# Patient Record
Sex: FEMALE | Race: WHITE | Employment: OTHER | URBAN - METROPOLITAN AREA
[De-identification: names, ages, dates, MRNs, and addresses within clinical notes are randomized per-mention and may not be internally consistent; named-entity substitution may affect disease eponyms.]

---

## 2017-01-06 ENCOUNTER — OFFICE VISIT (OUTPATIENT)
Dept: INTERNAL MEDICINE CLINIC | Age: 82
End: 2017-01-06

## 2017-01-06 VITALS
BODY MASS INDEX: 29.16 KG/M2 | TEMPERATURE: 98.3 F | DIASTOLIC BLOOD PRESSURE: 60 MMHG | HEART RATE: 70 BPM | RESPIRATION RATE: 18 BRPM | HEIGHT: 65 IN | OXYGEN SATURATION: 98 % | SYSTOLIC BLOOD PRESSURE: 116 MMHG | WEIGHT: 175 LBS

## 2017-01-06 DIAGNOSIS — A49.3 MYCOPLASMA INFECTION: ICD-10-CM

## 2017-01-06 DIAGNOSIS — J06.9 UPPER RESPIRATORY TRACT INFECTION, UNSPECIFIED TYPE: Primary | ICD-10-CM

## 2017-01-06 RX ORDER — AZITHROMYCIN 250 MG/1
250 TABLET, FILM COATED ORAL SEE ADMIN INSTRUCTIONS
Qty: 6 TAB | Refills: 0 | Status: SHIPPED | OUTPATIENT
Start: 2017-01-06 | End: 2017-01-11

## 2017-01-06 NOTE — MR AVS SNAPSHOT
Visit Information Date & Time Provider Department Dept. Phone Encounter #  
 1/6/2017  8:45 AM Thu Anglin MD [facility] [phone] [id number] Upcoming Health Maintenance Date Due DTaP/Tdap/Td series (1 - Tdap) 12/20/1956 GLAUCOMA SCREENING Q2Y 12/20/2000 Pneumococcal 65+ Low/Medium Risk (2 of 2 - PPSV23) 1/1/2010 INFLUENZA AGE 9 TO ADULT 8/1/2016 MEDICARE YEARLY EXAM 1/5/2017 Allergies as of 1/6/2017  Review Complete On: 1/6/2017 By: Thu Anglin MD  
 No Known Allergies Current Immunizations  Reviewed on 11/30/2010 Name Date Influenza High Dose Vaccine PF 11/4/2015 Influenza Vaccine Whole 10/1/2010 Pneumococcal Vaccine (Unspecified Type) 1/1/2005 Zoster 9/1/2010 Not reviewed this visit You Were Diagnosed With   
  
 Codes Comments Upper respiratory tract infection, unspecified type    -  Primary ICD-10-CM: J06.9 ICD-9-CM: 465.9 Vitals BP Pulse Temp Resp Height(growth percentile) Weight(growth percentile) 116/60 70 98.3 °F (36.8 °C) (Oral) 18 5' 5\" (1.651 m) 175 lb (79.4 kg) SpO2 BMI OB Status Smoking Status 98% 29.12 kg/m2 Hysterectomy Never Smoker Vitals History BMI and BSA Data Body Mass Index Body Surface Area  
 29.12 kg/m 2 1.91 m 2 Preferred Pharmacy Pharmacy Name Phone [address] [facility] AT [address] [place] [address]. [phone] Your Updated Medication List  
  
   
This list is accurate as of: 1/6/17  9:07 AM.  Always use your most recent med list.  
  
  
  
  
 aspirin 325 mg tablet Commonly known as:  ASPIRIN Take 325 mg by mouth daily. atorvastatin 20 mg tablet Commonly known as:  LIPITOR Take 1 tablet by mouth  daily  
  
 azelastine 137 mcg (0.1 %) nasal spray Commonly known as:  ASTELIN  
2 Sprays by Both Nostrils route two (2) times a day.  Use in each nostril as directed  
  
 azithromycin 250 mg tablet Commonly known as:  Ferdie Bel Take 1 Tab by mouth See Admin Instructions for 5 days. bumetanide 1 mg tablet Commonly known as:  BUMEX  
  
 cyclobenzaprine 5 mg tablet Commonly known as:  FLEXERIL Take 5 mg by mouth every twelve (12) hours as needed for Muscle Spasm(s). estradiol 10 mcg Tab vaginal tablet Commonly known as:  Esteban Gerard Insert 1 tablet into vagina every Tuesday and Thursday. isosorbide mononitrate ER 30 mg tablet Commonly known as:  IMDUR Take 1 tablet by mouth  every morning * lisinopril 2.5 mg tablet Commonly known as:  Lulu Paulo Take 2.5 mg by mouth two (2) times a day. * lisinopril 10 mg tablet Commonly known as:  PRINIVIL, ZESTRIL  
  
 methIMAzole 5 mg tablet Commonly known as:  TAPAZOLE Take 1.5 Tabs by mouth daily. metoprolol tartrate 25 mg tablet Commonly known as:  LOPRESSOR Take one-half tablet by  mouth twice a day NITROSTAT 0.4 mg SL tablet Generic drug:  nitroglycerin DISSOLVE 1 TABLET UNDER TONGUE EVERY 5 MINUTES AS NEEDED FOR CHEST PAIN  
  
 PLAVIX 75 mg Tab Generic drug:  clopidogrel Take 75 mg by mouth daily. TYLENOL EXTRA STRENGTH 500 mg tablet Generic drug:  acetaminophen Take  by mouth every six (6) hours as needed for Pain. * Notice: This list has 2 medication(s) that are the same as other medications prescribed for you. Read the directions carefully, and ask your doctor or other care provider to review them with you. Prescriptions Sent to Pharmacy Refills  
 azithromycin (ZITHROMAX Z-SUSIE) 250 mg tablet 0 Sig: Take 1 Tab by mouth See Admin Instructions for 5 days. Class: Normal  
 Pharmacy: [facility] [address] [place] [address] AT [address] [facility] [address]. Ph #: [phone] Route: Oral  
  
Introducing [facility]! Denita Centeno introduces [employer] patient portal. Now you can access parts of your medical record, email your doctor's office, and request medication refills online. 1. In your internet browser, go to [email or web address] 2. Click on the First Time User? Click Here link in the Sign In box. You will see the New Member Sign Up page. 3. Enter your [employer] Access Code exactly as it appears below. You will not need to use this code after youve completed the sign-up process. If you do not sign up before the expiration date, you must request a new code. · [employer] Access Code: Nikhil [email or web address] Expires: 4/6/2017  9:07 AM 
 
4. Enter the last four digits of your Social Security Number (xxxx) and Date of Birth (mm/dd/yyyy) as indicated and click Submit. You will be taken to the next sign-up page. 5. Create a [employer] ID. This will be your [employer] login ID and cannot be changed, so think of one that is secure and easy to remember. 6. Create a [employer] password. You can change your password at any time. 7. Enter your Password Reset Question and Answer. This can be used at a later time if you forget your password. 8. Enter your e-mail address. You will receive e-mail notification when new information is available in [address] 9. Click Sign Up. You can now view and download portions of your medical record. 10. Click the Download Summary menu link to download a portable copy of your medical information. If you have questions, please visit the Frequently Asked Questions section of the [employer] website. Remember, [employer] is NOT to be used for urgent needs. For medical emergencies, dial 911. Now available from your iPhone and Android! Please provide this summary of care documentation to your next provider. Your primary care clinician is listed as [id number] [address] [place] [address] [address]. If you have any questions after today's visit, please call [phone].

## 2017-01-06 NOTE — PROGRESS NOTES
Subjective:      Patient : This patient is a .age, .sex that presents with a chief complaint of cough:  productive of  white. , sore throat : which increases with swallowing  and hoarseness and malaises. The symptoms have been present for 6 days. They have worsened.  DX pneumoniza  . Objective:     Visit Vitals    /60    Pulse 70    Temp 98.3 °F (36.8 °C) (Oral)    Resp 18    Ht 5' 5\" (1.651 m)    Wt 175 lb (79.4 kg)    SpO2 98%    BMI 29.12 kg/m2          Skin:  warm  HEENT:  rhinorrhea  Heart regular rhythm  Lungs: clear to auscultation and percussion throughout both lung fields  CV:normal  Abdomen   Extremeties:  Neuro alert, oriented x 3      Past Medical History   Diagnosis Date    CAD (coronary artery disease)      s/p cabg, dr Mercedes Milder 2000 x4 vessel disease    Hypercholesterolemia     Hypertension     Hyperthyroidism      1990's, dr Lee Star    Osteoporosis      Family History   Problem Relation Age of Onset    Cancer Mother 48     breast     Stroke Mother     Cancer Father      lung    Lung Disease Father     Heart Disease Sister      Current Outpatient Prescriptions   Medication Sig Dispense Refill    atorvastatin (LIPITOR) 20 mg tablet Take 1 tablet by mouth  daily 90 Tab 1    estradiol (VAGIFEM) 10 mcg tab vaginal tablet Insert 1 tablet into vagina every Tuesday and Thursday. 90 Tab 1    methimazole (TAPAZOLE) 5 mg tablet Take 1.5 Tabs by mouth daily. 135 Tab 1    metoprolol (LOPRESSOR) 25 mg tablet Take one-half tablet by  mouth twice a day 180 Tab 1    isosorbide mononitrate ER (IMDUR) 30 mg tablet Take 1 tablet by mouth  every morning (Patient taking differently: Take 2 tablet by mouth  every morning) 90 Tab 1    NITROSTAT 0.4 mg SL tablet DISSOLVE 1 TABLET UNDER TONGUE EVERY 5 MINUTES AS NEEDED FOR CHEST PAIN 25 Tab 0    azelastine (ASTELIN) 137 mcg nasal spray 2 Sprays by Both Nostrils route two (2) times a day.  Use in each nostril as directed 1 Bottle 11    cyclobenzaprine (FLEXERIL) 5 mg tablet Take 5 mg by mouth every twelve (12) hours as needed for Muscle Spasm(s).  acetaminophen (TYLENOL EXTRA STRENGTH) 500 mg tablet Take  by mouth every six (6) hours as needed for Pain.  bumetanide (BUMEX) 1 mg tablet       lisinopril (PRINIVIL, ZESTRIL) 10 mg tablet       clopidogrel (PLAVIX) 75 mg tablet Take 75 mg by mouth daily.  lisinopril (PRINIVIL, ZESTRIL) 2.5 mg tablet Take 2.5 mg by mouth two (2) times a day.  aspirin (ASPIRIN) 325 mg tablet Take 325 mg by mouth daily. No Known Allergies  Social History     Social History    Marital status:      Spouse name: N/A    Number of children: N/A    Years of education: N/A     Occupational History    Not on file. Social History Main Topics    Smoking status: Never Smoker    Smokeless tobacco: Never Used    Alcohol use No    Drug use: No    Sexual activity: Yes     Partners: Male     Other Topics Concern    Not on file     Social History Narrative    Lives with . Assessment:     Uri poss mycoplasma infection    Plan:     The patient seems to have a bacterial process. Will treat empirically   Due to  being so ill will cover                 Veritojoseph Seymour was seen today for cough. Diagnoses and all orders for this visit:    Upper respiratory tract infection, unspecified type    Mycoplasma infection    Other orders  -     azithromycin (ZITHROMAX Z-SUSIE) 250 mg tablet; Take 1 Tab by mouth See Admin Instructions for 5 days.

## 2017-01-08 RX ORDER — ATORVASTATIN CALCIUM 20 MG/1
TABLET, FILM COATED ORAL
Qty: 90 TAB | Refills: 1 | Status: SHIPPED | OUTPATIENT
Start: 2017-01-08 | End: 2017-07-20 | Stop reason: SDUPTHER

## 2017-02-14 RX ORDER — ESTRADIOL 10 UG/1
TABLET VAGINAL
Qty: 25 TAB | Refills: 2 | Status: SHIPPED | OUTPATIENT
Start: 2017-02-14 | End: 2017-07-12

## 2017-02-14 RX ORDER — ESTRADIOL 10 UG/1
INSERT VAGINAL
Qty: 90 TAB | Refills: 0 | Status: SHIPPED | OUTPATIENT
Start: 2017-02-14 | End: 2017-02-14 | Stop reason: SDUPTHER

## 2017-07-12 ENCOUNTER — OFFICE VISIT (OUTPATIENT)
Dept: INTERNAL MEDICINE CLINIC | Age: 82
End: 2017-07-12

## 2017-07-12 VITALS
BODY MASS INDEX: 28.32 KG/M2 | TEMPERATURE: 98.1 F | SYSTOLIC BLOOD PRESSURE: 120 MMHG | HEART RATE: 57 BPM | DIASTOLIC BLOOD PRESSURE: 62 MMHG | OXYGEN SATURATION: 97 % | WEIGHT: 170 LBS | HEIGHT: 65 IN | RESPIRATION RATE: 16 BRPM

## 2017-07-12 DIAGNOSIS — Z23 NEED FOR TDAP VACCINATION: ICD-10-CM

## 2017-07-12 DIAGNOSIS — E78.00 PURE HYPERCHOLESTEROLEMIA: ICD-10-CM

## 2017-07-12 DIAGNOSIS — R10.12 LEFT UPPER QUADRANT PAIN: ICD-10-CM

## 2017-07-12 DIAGNOSIS — Z23 NEED FOR VACCINATION WITH 13-POLYVALENT PNEUMOCOCCAL CONJUGATE VACCINE: ICD-10-CM

## 2017-07-12 DIAGNOSIS — Z00.00 ROUTINE GENERAL MEDICAL EXAMINATION AT A HEALTH CARE FACILITY: ICD-10-CM

## 2017-07-12 DIAGNOSIS — E05.90 HYPERTHYROIDISM: ICD-10-CM

## 2017-07-12 DIAGNOSIS — F43.21 GRIEF: ICD-10-CM

## 2017-07-12 DIAGNOSIS — M54.81 OCCIPITAL NEURALGIA OF LEFT SIDE: ICD-10-CM

## 2017-07-12 DIAGNOSIS — Z71.89 LIVING WILL, COUNSELING/DISCUSSION: ICD-10-CM

## 2017-07-12 DIAGNOSIS — M17.12 PRIMARY OSTEOARTHRITIS OF LEFT KNEE: Primary | ICD-10-CM

## 2017-07-12 DIAGNOSIS — I10 ESSENTIAL HYPERTENSION: ICD-10-CM

## 2017-07-12 DIAGNOSIS — Z00.00 MEDICARE ANNUAL WELLNESS VISIT, SUBSEQUENT: ICD-10-CM

## 2017-07-12 RX ORDER — LISINOPRIL 10 MG/1
5 TABLET ORAL DAILY
Qty: 90 TAB | Refills: 1 | Status: SHIPPED | OUTPATIENT
Start: 2017-07-12 | End: 2019-04-05 | Stop reason: ALTCHOICE

## 2017-07-12 NOTE — PROGRESS NOTES
Reviewed record in preparation for visit and have obtained necessary documentation. Identified pt with two pt identifiers(name and ). Health Maintenance Due   Topic    DTaP/Tdap/Td series (1 - Tdap)    GLAUCOMA SCREENING Q2Y     Pneumococcal 65+ Low/Medium Risk (2 of 2 - PPSV23)    MEDICARE YEARLY EXAM          Chief Complaint   Patient presents with   Radha Montiel Annual Wellness Visit        Wt Readings from Last 3 Encounters:   17 175 lb (79.4 kg)   16 175 lb (79.4 kg)   16 167 lb (75.8 kg)     Temp Readings from Last 3 Encounters:   17 98.3 °F (36.8 °C) (Oral)   16 98.4 °F (36.9 °C) (Oral)   16 98.5 °F (36.9 °C) (Oral)     BP Readings from Last 3 Encounters:   17 116/60   16 157/69   16 122/63     Pulse Readings from Last 3 Encounters:   17 70   16 (!) 56   16 70           Learning Assessment:  :     Learning Assessment 2014   PRIMARY LEARNER Patient Patient   PRIMARY LANGUAGE ENGLISH ENGLISH   LEARNER PREFERENCE PRIMARY DEMONSTRATION READING   ANSWERED BY patient patient   RELATIONSHIP SELF SELF       Depression Screening:  :     PHQ over the last two weeks 2017   PHQ Not Done -   Little interest or pleasure in doing things Not at all   Feeling down, depressed or hopeless Not at all   Total Score PHQ 2 0       Fall Risk Assessment:  :     Fall Risk Assessment, last 12 mths 2017   Able to walk? Yes   Fall in past 12 months? No       Abuse Screening:  :     Abuse Screening Questionnaire 2017   Do you ever feel afraid of your partner? N   Are you in a relationship with someone who physically or mentally threatens you? N   Is it safe for you to go home? Y       Coordination of Care Questionnaire:  :     1) Have you been to an emergency room, urgent care clinic since your last visit No     Hospitalized since your last visit? No            2) Have you seen or consulted any other health care providers outside of [facility] [address] since your last visit? Yes    3) Do you have an Advance Directive on file? Yes   4) Are you interested in receiving information on Advance Directives? No        Patient is accompanied by self I have received verbal consent from Natalie Hernández to discuss any/all medical information while they are present in the room.

## 2017-07-12 NOTE — PROGRESS NOTES
HISTORY OF PRESENT ILLNESS  Analilia Bunn is a 80 y.o. female. HPI  Here for several concerns. She has had left knee pain medially with activity of late. She has had right tkr. She uses tylenol. She also has long standing left posterior neck pain to her occiput intermittently for years worse lately. The pain is brief. She is having luq pain for months. She is constipated. She has concerns about pancreatic cancer as her  just  of same. They were  61 years. She is due for labs on a statin. She is dizzy on her ace. She has not seen endocrine recently for her overactive thyroid. Past Medical History:   Diagnosis Date    CAD (coronary artery disease)     s/p cabg, dr Sabra Mcarthur 2000 x4 vessel disease    Hypercholesterolemia     Hypertension     Hyperthyroidism     , dr Tyrese Astorga    Osteoporosis      Current Outpatient Prescriptions   Medication Sig    lisinopril (PRINIVIL, ZESTRIL) 10 mg tablet Take 0.5 Tabs by mouth daily.  YUVAFEM 10 mcg tab vaginal tablet USE TWICE A WEEK    atorvastatin (LIPITOR) 20 mg tablet Take 1 tablet by mouth  daily    acetaminophen (TYLENOL EXTRA STRENGTH) 500 mg tablet Take  by mouth every six (6) hours as needed for Pain.  bumetanide (BUMEX) 1 mg tablet     methimazole (TAPAZOLE) 5 mg tablet Take 1.5 Tabs by mouth daily.  metoprolol (LOPRESSOR) 25 mg tablet Take one-half tablet by  mouth twice a day    isosorbide mononitrate ER (IMDUR) 30 mg tablet Take 1 tablet by mouth  every morning (Patient taking differently: Take 2 tablet by mouth  every morning)    NITROSTAT 0.4 mg SL tablet DISSOLVE 1 TABLET UNDER TONGUE EVERY 5 MINUTES AS NEEDED FOR CHEST PAIN    aspirin (ASPIRIN) 325 mg tablet Take 325 mg by mouth daily.  cyclobenzaprine (FLEXERIL) 5 mg tablet Take 5 mg by mouth every twelve (12) hours as needed for Muscle Spasm(s).  clopidogrel (PLAVIX) 75 mg tablet Take 75 mg by mouth daily.      No current facility-administered medications for this visit. Review of Systems   All other systems reviewed and are negative. Visit Vitals    /62 (BP 1 Location: Left arm, BP Patient Position: At rest)    Pulse (!) 57    Temp 98.1 °F (36.7 °C) (Oral)    Resp 16    Ht 5' 5\" (1.651 m)    Wt 170 lb (77.1 kg)    SpO2 97%    BMI 28.29 kg/m2       Physical Exam   Constitutional: She appears well-developed and well-nourished. Cardiovascular: Normal rate, regular rhythm and normal heart sounds. Pulmonary/Chest: Effort normal and breath sounds normal. No respiratory distress. She has no wheezes. She has no rales. Abdominal: Soft. Bowel sounds are normal. She exhibits no distension and no mass. There is no tenderness. There is no rebound and no guarding. Psychiatric: Her behavior is normal. Thought content normal.   Tearful intermittently. Nursing note and vitals reviewed. ASSESSMENT and PLAN  Sergio Paniagua was seen today for annual wellness visit. Diagnoses and all orders for this visit:    Primary osteoarthritis of left knee  -     REFERRAL TO ORTHOPEDIC SURGERY  Continue tylenol 1000 mg TID. Grief   good support with daughter and active in [employer]. Occipital neuralgia of left side  Reassurance. Left upper quadrant pain  -     CT ABD PELV W WO CONT; Future  -     REFERRAL TO GASTROENTEROLOGY  -     CBC WITH AUTOMATED DIFF    Hyperthyroidism  -     TSH 3RD GENERATION; Future  -     T4  -     T3 TOTAL    Essential hypertension  -     METABOLIC PANEL, COMPREHENSIVE  -    reduce lisinopril (PRINIVIL, ZESTRIL) 10 mg tablet; Take 0.5 Tabs by mouth daily.     Pure hypercholesterolemia  -     LIPID PANEL        Reviewed plan of care with the patient who has provided input and agrees with the goals

## 2017-07-12 NOTE — MR AVS SNAPSHOT
Visit Information Date & Time Provider Department Dept. Phone Encounter #  
 7/12/2017 11:30 AM Charla Kim MD [facility] [phone] [id number] Upcoming Health Maintenance Date Due DTaP/Tdap/Td series (1 - Tdap) 12/20/1956 GLAUCOMA SCREENING Q2Y 12/20/2000 Pneumococcal 65+ Low/Medium Risk (2 of 2 - PPSV23) 1/1/2010 MEDICARE YEARLY EXAM 1/5/2017 INFLUENZA AGE 9 TO ADULT 8/1/2017 Allergies as of 7/12/2017  Review Complete On: 7/12/2017 By: Grecia Reddy No Known Allergies Current Immunizations  Reviewed on 11/30/2010 Name Date Influenza High Dose Vaccine PF 11/4/2015 Influenza Vaccine Whole 10/1/2010 Pneumococcal Vaccine (Unspecified Type) 1/1/2005 Zoster 9/1/2010 Not reviewed this visit You Were Diagnosed With   
  
 Codes Comments Primary osteoarthritis of left knee    -  Primary ICD-10-CM: M17.12 
ICD-9-CM: 715.16 Grief     ICD-10-CM: F38.34 ICD-9-CM: 309.0 Occipital neuralgia of left side     ICD-10-CM: M54.81 ICD-9-CM: 723.8 Left upper quadrant pain     ICD-10-CM: R10.12 ICD-9-CM: 789.02 Hyperthyroidism     ICD-10-CM: E05.90 ICD-9-CM: 242.90 Essential hypertension     ICD-10-CM: I10 
ICD-9-CM: 401.9 Pure hypercholesterolemia     ICD-10-CM: E78.00 ICD-9-CM: 272.0 Medicare annual wellness visit, subsequent     ICD-10-CM: Z00.00 ICD-9-CM: V70.0 Need for vaccination with 13-polyvalent pneumococcal conjugate vaccine     ICD-10-CM: Z77 ICD-9-CM: V03.82 Need for Tdap vaccination     ICD-10-CM: Q75 ICD-9-CM: V06.1 Living will, counseling/discussion     ICD-10-CM: Z71.89 ICD-9-CM: V65.49 Vitals BP Pulse Temp Resp Height(growth percentile) Weight(growth percentile) 120/62 (BP 1 Location: Left arm, BP Patient Position: At rest) (!) 57 98.1 °F (36.7 °C) (Oral) 16 5' 5\" (1.651 m) 170 lb (77.1 kg) SpO2 BMI OB Status Smoking Status 97% 28.29 kg/m2 Hysterectomy Never Smoker BMI and BSA Data Body Mass Index Body Surface Area  
 28.29 kg/m 2 1.88 m 2 Preferred Pharmacy Pharmacy Name Phone [address] AT [address] [place] [address]. [phone] Your Updated Medication List  
  
   
This list is accurate as of: 7/12/17 12:10 PM.  Always use your most recent med list.  
  
  
  
  
 aspirin 325 mg tablet Commonly known as:  ASPIRIN Take 325 mg by mouth daily. atorvastatin 20 mg tablet Commonly known as:  LIPITOR Take 1 tablet by mouth  daily  
  
 bumetanide 1 mg tablet Commonly known as:  BUMEX  
  
 cyclobenzaprine 5 mg tablet Commonly known as:  FLEXERIL Take 5 mg by mouth every twelve (12) hours as needed for Muscle Spasm(s). isosorbide mononitrate ER 30 mg tablet Commonly known as:  IMDUR Take 1 tablet by mouth  every morning  
  
 lisinopril 10 mg tablet Commonly known as:  Katlyn Dowdy Take 0.5 Tabs by mouth daily. methIMAzole 5 mg tablet Commonly known as:  TAPAZOLE Take 1.5 Tabs by mouth daily. metoprolol tartrate 25 mg tablet Commonly known as:  LOPRESSOR Take one-half tablet by  mouth twice a day NITROSTAT 0.4 mg SL tablet Generic drug:  nitroglycerin DISSOLVE 1 TABLET UNDER TONGUE EVERY 5 MINUTES AS NEEDED FOR CHEST PAIN  
  
 PLAVIX 75 mg Tab Generic drug:  clopidogrel Take 75 mg by mouth daily. TYLENOL EXTRA STRENGTH 500 mg tablet Generic drug:  acetaminophen Take  by mouth every six (6) hours as needed for Pain. YUVAFEM 10 mcg Tab vaginal tablet Generic drug:  estradiol USE TWICE A WEEK Prescriptions Sent to Pharmacy Refills  
 lisinopril (PRINIVIL, ZESTRIL) 10 mg tablet 1 Sig: Take 0.5 Tabs by mouth daily.   
 Class: Normal  
 Pharmacy: [facility].  #: [phone] Route: Oral  
  
We Performed the Following CBC WITH AUTOMATED DIFF [50899 CPT(R)] LIPID PANEL [66053 CPT(R)] METABOLIC PANEL, COMPREHENSIVE [60595 CPT(R)] REFERRAL TO GASTROENTEROLOGY [TVF52 Custom] Comments:  
 Please evaluate patient for abd pain. REFERRAL TO OPHTHALMOLOGY [REF57 Custom] Comments:  
 Please evaluate patient for screen. REFERRAL TO ORTHOPEDIC SURGERY [REF62 Custom] Comments:  
 Please evaluate patient for left knee. T3 TOTAL [id number] CPT(R)] T4 [id number] CPT(R)] To-Do List   
 07/12/2017 Imaging:  CT ABD PELV W WO CONT   
  
 07/12/2017 Lab:  TSH 3RD GENERATION Referral Information Referral ID Referred By Referred To  
  
 [id number] Dalila Philip Not Available Visits Status Start Date End Date 1 New Request 7/12/17 7/12/18 If your referral has a status of pending review or denied, additional information will be sent to support the outcome of this decision. Referral ID Referred By Referred To  
 [id number] [facility], [address] [place] [address] [facility] [id number] [place], [facility] [address], [address] Visits Status Start Date End Date 1 New Request 7/12/17 7/12/18 If your referral has a status of pending review or denied, additional information will be sent to support the outcome of this decision. Referral ID Referred By Referred To  
 [id number] [facility], [address] [place], [address] Visits Status Start Date End Date 1 New Request 7/12/17 7/12/18 If your referral has a status of pending review or denied, additional information will be sent to support the outcome of this decision. Patient Instructions Medicare Part B Preventive Services Limitations Recommendation Scheduled Glaucoma Screening 
(Eye Exam)  Covered for patients with diagnosis of diabetes or family history of glaucoma; -Americans age 48 and older; -Americans age 72 and older Completed Recommended every 1-2 years Due now Colorectal Cancer Screening 
  
-Fecal occult blood test every year OR 
-Flexible sigmoidoscopy every 5 yrs OR 
-Colonoscopy every 10 years OR 
-Barium Enema Age 54-65; After age 76 if history of abnormal results Completed Recommended every 10 years  Completed Let your provider know if you have any concerns Cardiovascular Screening Blood Tests  
(Cholesterol panel) Lipid panel every 5 years; Annually if diagnosed with high cholesterol and/or diabetes  Completed 8/16/2016 Recommended annually Due NOW Diabetes Screening Tests -BMP or HgbA1C BMP every 3 years for non-diabetic patients Hgb A1C every 6 months for  
pre-diabetic patients Completed 8/16/2016 Labs- CMP Due NOW Seasonal Influenza Vaccination Age 6 months and older Completed 11/15/2015 Recommended Annually Due Fall 2017 Pneumococcal Vaccination 
(PPSV 23) Age 72 and older; 
<65 if at high risk for getting Pneumonia Completed 1/1/2005 Recommended once Completed Prevnar Vaccination (PCV 13) Age 72 and older Completed Recommended once Due NOW Take prescription to pharmacy for administration Tetanus Vaccine -Only covered by Medicare Part D through the pharmacy -Requires a prescription from your primary care provider Completed Recommended every 10 years   Due NOW Take prescription to pharmacy for administration Zoster Vaccine (Shingles) Age 61 and older, one time dose 
  
-Only covered by Medicare Part D through the pharmacy Completed 9/1/2010 Recommended once  Completed Bone Mass Measurement (Dexascan) Covered age 72 and older Completed 1/21/2016 Let your provider know if you have any concerns Screening Mammography Covered annually age 36 and older Completed 7/11/2014 Completed Let your provider know if you have any concerns Screening Pap Tests and Pelvic Examination  Covered every 2 years, or annually for high risk Completed Recommended every 3 years age 18-67 Completed Let your provider know if you have any concerns Advanced Medical Directive If you have completed an Advanced Medical Directive please bring a copy to the office at your convenience to be scanned into your record. Introducing [facility] SERVICES! Donita Delatorre introduces TORIA patient portal. Now you can access parts of your medical record, email your doctor's office, and request medication refills online. 1. In your internet browser, go to [email or web address] 2. Click on the First Time User? Click Here link in the Sign In box. You will see the New Member Sign Up page. 3. Enter your TORIA Access Code exactly as it appears below. You will not need to use this code after youve completed the sign-up process. If you do not sign up before the expiration date, you must request a new code. · TORIA Access Code: [email or web address] Expires: 10/10/2017 12:10 PM 
 
4. Enter the last four digits of your Social Security Number (xxxx) and Date of Birth (mm/dd/yyyy) as indicated and click Submit. You will be taken to the next sign-up page. 5. Create a TORIA ID. This will be your TORIA login ID and cannot be changed, so think of one that is secure and easy to remember. 6. Create a TORIA password. You can change your password at any time. 7. Enter your Password Reset Question and Answer. This can be used at a later time if you forget your password. 8. Enter your e-mail address. You will receive e-mail notification when new information is available in [address] 9. Click Sign Up. You can now view and download portions of your medical record. 10. Click the Download Summary menu link to download a portable copy of your medical information. If you have questions, please visit the Frequently Asked Questions section of the [employer] website. Remember, [employer] is NOT to be used for urgent needs. For medical emergencies, dial 911. Now available from your iPhone and Android! Please provide this summary of care documentation to your next provider. Your primary care clinician is listed as [id number] [address] [place] [address] [address]. If you have any questions after today's visit, please call [phone].

## 2017-07-12 NOTE — PROGRESS NOTES
This is a Subsequent Medicare Annual Wellness Visit providing Personalized Prevention Plan Services (PPPS) (Performed 12 months after initial AWV and PPPS )    I have reviewed the patient's medical history in detail and updated the computerized patient record. History     Past Medical History:   Diagnosis Date    CAD (coronary artery disease)     s/p cabg, dr Marilyn Burger 2000 x4 vessel disease    Hypercholesterolemia     Hypertension     Hyperthyroidism     1990's, dr Jenifer Arroyo    Osteoporosis       Past Surgical History:   Procedure Laterality Date    BIOPSY OF BREAST, INCISIONAL  1985    LEFT breast    CABG, ARTERY-VEIN, FOUR  2000    heart surgery at [address] [place] [address]      dr Josee bolden    HX COLONOSCOPY  2011    HX HYSTERECTOMY  2003    HX BRIAN AND BSO      2002    HX TRACHEOSTOMY      whooping cough age 2    SC ANESTH,KNEE AREA SURGERY      tkr, right; 2008     Current Outpatient Prescriptions   Medication Sig Dispense Refill    lisinopril (PRINIVIL, ZESTRIL) 10 mg tablet Take 0.5 Tabs by mouth daily. 90 Tab 1    YUVAFEM 10 mcg tab vaginal tablet USE TWICE A WEEK 24 Tab 1    atorvastatin (LIPITOR) 20 mg tablet Take 1 tablet by mouth  daily 90 Tab 1    acetaminophen (TYLENOL EXTRA STRENGTH) 500 mg tablet Take  by mouth every six (6) hours as needed for Pain.  bumetanide (BUMEX) 1 mg tablet       methimazole (TAPAZOLE) 5 mg tablet Take 1.5 Tabs by mouth daily. 135 Tab 1    metoprolol (LOPRESSOR) 25 mg tablet Take one-half tablet by  mouth twice a day 180 Tab 1    isosorbide mononitrate ER (IMDUR) 30 mg tablet Take 1 tablet by mouth  every morning (Patient taking differently: Take 2 tablet by mouth  every morning) 90 Tab 1    NITROSTAT 0.4 mg SL tablet DISSOLVE 1 TABLET UNDER TONGUE EVERY 5 MINUTES AS NEEDED FOR CHEST PAIN 25 Tab 0    aspirin (ASPIRIN) 325 mg tablet Take 325 mg by mouth daily.       cyclobenzaprine (FLEXERIL) 5 mg tablet Take 5 mg by mouth every twelve (12) hours as needed for Muscle Spasm(s).  clopidogrel (PLAVIX) 75 mg tablet Take 75 mg by mouth daily. No Known Allergies  Family History   Problem Relation Age of Onset    Cancer Mother 48     breast     Stroke Mother     Cancer Father      lung    Lung Disease Father     Heart Disease Sister      Social History   Substance Use Topics    Smoking status: Never Smoker    Smokeless tobacco: Never Used    Alcohol use No     Patient Active Problem List   Diagnosis Code    Hyperlipidemia with target LDL less than 70 E78.5    Hyperthyroidism E05.90    HTN (hypertension) I10    Osteopenia M85.80    Coronary artery disease involving autologous artery coronary bypass graft without angina pectoris I25.810    Living will, counseling/discussion Z71.89       Depression Risk Factor Screening:     PHQ over the last two weeks 7/12/2017   PHQ Not Done -   Little interest or pleasure in doing things Not at all   Feeling down, depressed or hopeless Not at all   Total Score PHQ 2 0     Alcohol Risk Factor Screening:   deferred      Functional Ability and Level of Safety:     Hearing Loss   none    Activities of Daily Living   Self-care. Requires assistance with: no ADLs    Fall Risk     Fall Risk Assessment, last 12 mths 7/12/2017   Able to walk? Yes   Fall in past 12 months? No     Abuse Screen   Patient is not abused    Review of Systems   Not required  annual wellness visit    Physical Examination     Evaluation of Cognitive Function:  Mood/affect:  sad  Appearance: age appropriate, casually dressed and younger than stated age  Family member/caregiver input: daughter is present    No exam performed today, annual wellness visit.     Patient Care Team:  Joe Hall MD as PCP - General (Internal Medicine)  Lauro Bundy MD (Cardiology)  Marlo Jovel, RN as Ambulatory Care Navigator (Internal Medicine)  Thomas Arzate MD (Internal Medicine)    Advice/Referrals/Counseling   Education and counseling provided:  Are appropriate based on today's review and evaluation  End-of-Life planning (with patient's consent)  Pneumococcal Vaccine  Influenza Vaccine  Screening for glaucoma      Assessment/Plan       ICD-10-CM ICD-9-CM    1. Primary osteoarthritis of left knee M17.12 715.16 REFERRAL TO ORTHOPEDIC SURGERY   2. Grief F43.20 309.0    3. Occipital neuralgia of left side M54.81 723.8    4. Left upper quadrant pain R10.12 789.02 CT ABD PELV W WO CONT      REFERRAL TO GASTROENTEROLOGY      CBC WITH AUTOMATED DIFF   5. Hyperthyroidism E05.90 242.90 TSH 3RD GENERATION      T4      T3 TOTAL   6. Essential hypertension P49 926.3 METABOLIC PANEL, COMPREHENSIVE      lisinopril (PRINIVIL, ZESTRIL) 10 mg tablet   7. Pure hypercholesterolemia E78.00 272.0 LIPID PANEL   8. Medicare annual wellness visit, subsequent Z00.00 V70.0 REFERRAL TO OPHTHALMOLOGY   9. Need for vaccination with 13-polyvalent pneumococcal conjugate vaccine Z23 V03.82    10. Need for Tdap vaccination Z23 V06.1    11. Living will, counseling/discussion Z71.89 V65.49      An After Visit Summary was printed and given to the patient. 1. All age appropriate screenings and immunizations are discussed with patient. Patient is given a prescription today for prevnar and tetanus vaccines. She is due for an eye exam (glaucoma screening) and is given a referral today. She no longer gets mammograms. 2.  She has an AMD, but her  has recently passed away and she may need to up date form. She is given a form today and instructed on how to complete it. 3.  She does not exercise regularly. She is still very much grieving the loss of her . She states she will consider becoming more active in the coming months, but not at this time.

## 2017-07-12 NOTE — PATIENT INSTRUCTIONS
Medicare Part B Preventive Services Limitations Recommendation Scheduled   Glaucoma Screening  (Eye Exam)  Covered for patients with diagnosis of diabetes or family history of glaucoma; -Americans age 48 and older; -Americans age 72 and older Completed  Recommended every 1-2 years Due now      Colorectal Cancer Screening     -Fecal occult blood test every year OR  -Flexible sigmoidoscopy every 5 yrs OR  -Colonoscopy every 10 years OR  -Barium Enema Age 54-65; After age 76 if history of abnormal results Completed         Recommended every 10 years  Completed     Let your provider know if you have any concerns   Cardiovascular Screening Blood Tests   (Cholesterol panel) Lipid panel every 5 years;  Annually if diagnosed with high cholesterol and/or diabetes  Completed   8/16/2016  Recommended annually Due NOW   Diabetes Screening Tests     -BMP or HgbA1C BMP every 3 years for non-diabetic patients     Hgb A1C every 6 months for   pre-diabetic patients Completed   8/16/2016  Labs- CMP Due NOW   Seasonal Influenza Vaccination Age 6 months and older Completed   11/15/2015  Recommended Annually Due Fall 2017   Pneumococcal Vaccination  (PPSV 21) Age 72 and older;  <65 if at high risk for getting Pneumonia Completed   1/1/2005  Recommended once     Completed   Prevnar Vaccination  (PCV 13) Age 72 and older Completed     Recommended once Due NOW     Take prescription to pharmacy for administration   Tetanus Vaccine -Only covered by Medicare Part D through the [address] a prescription from your primary care provider Completed      Recommended every 10 years   Due NOW     Take prescription to pharmacy for administration   Zoster Vaccine (Shingles) Age 61 and older, one time dose     -Only covered by Medicare Part D through the pharmacy       Completed   9/1/2010      Recommended once  Completed   Bone Mass Measurement ([address]) Covered age 72 and older    Completed   1/21/2016         Let your provider know if you have any concerns   Screening Mammography      Covered annually age 36 and older Completed  7/11/2014 Completed   Let your provider know if you have any concerns     Screening Pap Tests and Pelvic Examination  Covered every 2 years, or annually for high risk    Completed    Recommended every 3 years age 18-67 Completed     Let your provider know if you have any concerns        Advanced Medical Directive  If you have completed an Advanced Medical Directive please bring a copy to the office at your convenience to be scanned into your record.

## 2017-07-13 ENCOUNTER — HOSPITAL ENCOUNTER (OUTPATIENT)
Dept: LAB | Age: 82
Discharge: HOME | End: 2017-07-13
Payer: MEDICARE

## 2017-07-13 PROCEDURE — 84443 ASSAY THYROID STIM HORMONE: CPT

## 2017-07-13 PROCEDURE — 36415 COLL VENOUS BLD VENIPUNCTURE: CPT

## 2017-07-14 ENCOUNTER — TELEPHONE (OUTPATIENT)
Dept: INTERNAL MEDICINE CLINIC | Age: 82
End: 2017-07-14

## 2017-07-14 LAB — TSH SERPL DL<=0.005 MIU/L-ACNC: 0.1 UIU/ML (ref 0.45–4.5)

## 2017-07-14 NOTE — TELEPHONE ENCOUNTER
Pt states she need her blood work fax to [employer] [phone] Fax [phone] right now, or she will have to do more blood work, and she don't want too.  Best contact number [phone].              From answering service

## 2017-07-14 NOTE — PROGRESS NOTES
Spoke with patient. Her thyroid is high on methimazole 5 mg so she will double the dose and see endocrine soon.

## 2017-07-18 ENCOUNTER — TELEPHONE (OUTPATIENT)
Dept: INTERNAL MEDICINE CLINIC | Age: 82
End: 2017-07-18

## 2017-07-18 NOTE — TELEPHONE ENCOUNTER
Spoke with patient. Advised patient per Dr Rachele Rich that CT shows gallstones but this should cause RUQ pain not LUQ. See GI as discussed. Faxed CT scan to [facility].  Patient verbalized understanding

## 2017-10-05 DIAGNOSIS — R10.12 LEFT UPPER QUADRANT PAIN: ICD-10-CM

## 2017-10-11 ENCOUNTER — TELEPHONE (OUTPATIENT)
Dept: INTERNAL MEDICINE CLINIC | Age: 82
End: 2017-10-11

## 2017-11-13 RX ORDER — ESTRADIOL 10 UG/1
INSERT VAGINAL
Qty: 25 TAB | Refills: 3 | Status: SHIPPED | OUTPATIENT
Start: 2017-11-13 | End: 2018-06-05 | Stop reason: SDUPTHER

## 2017-12-06 ENCOUNTER — TELEPHONE (OUTPATIENT)
Dept: INTERNAL MEDICINE CLINIC | Age: 82
End: 2017-12-06

## 2017-12-07 ENCOUNTER — OFFICE VISIT (OUTPATIENT)
Dept: INTERNAL MEDICINE CLINIC | Age: 82
End: 2017-12-07

## 2017-12-07 ENCOUNTER — HOSPITAL ENCOUNTER (OUTPATIENT)
Dept: LAB | Age: 82
Discharge: HOME | End: 2017-12-07
Payer: MEDICARE

## 2017-12-07 VITALS
HEIGHT: 65 IN | HEART RATE: 62 BPM | WEIGHT: 171 LBS | DIASTOLIC BLOOD PRESSURE: 53 MMHG | SYSTOLIC BLOOD PRESSURE: 112 MMHG | RESPIRATION RATE: 20 BRPM | BODY MASS INDEX: 28.49 KG/M2 | OXYGEN SATURATION: 99 % | TEMPERATURE: 97.8 F

## 2017-12-07 DIAGNOSIS — R10.10 PAIN OF UPPER ABDOMEN: ICD-10-CM

## 2017-12-07 DIAGNOSIS — N64.4 BREAST PAIN, LEFT: Primary | ICD-10-CM

## 2017-12-07 PROCEDURE — 80053 COMPREHEN METABOLIC PANEL: CPT

## 2017-12-07 PROCEDURE — 36415 COLL VENOUS BLD VENIPUNCTURE: CPT

## 2017-12-07 PROCEDURE — 82150 ASSAY OF AMYLASE: CPT

## 2017-12-07 PROCEDURE — 83690 ASSAY OF LIPASE: CPT

## 2017-12-07 NOTE — PATIENT INSTRUCTIONS
[employer] Activation    Thank you for requesting access to [employer] Please follow the instructions below to securely access and download your online medical record. [employer] allows you to send messages to your doctor, view your test results, renew your prescriptions, schedule appointments, and more. How Do I Sign Up? 1. In your internet browser, go to [email or web address]  2. Click on the First Time User? Click Here link in the Sign In box. You will be redirect to the New Member Sign Up page. 3. Enter your "[employer] Access Code exactly as it appears below. You will not need to use this code after youve completed the sign-up process. If you do not sign up before the expiration date, you must request a new code. [employer] Access Code: [email or web address]  Expires: 3/7/2018 11:17 AM (This is the date your "[employer] access code will )    4. Enter the last four digits of your Social Security Number (xxxx) and Date of Birth (mm/dd/yyyy) as indicated and click Submit. You will be taken to the next sign-up page. 5. Create a "[employer]" ID. This will be your [employer] login ID and cannot be changed, so think of one that is secure and easy to remember. 6. Create a "[employer]" password. You can change your password at any time. 7. Enter your Password Reset Question and Answer. This can be used at a later time if you forget your password. 8. Enter your e-mail address. You will receive e-mail notification when new information is available in [address] 9. Click Sign Up. You can now view and download portions of your medical record. 10. Click the Download Summary menu link to download a portable copy of your medical information. Additional Information    If you have questions, please visit the Frequently Asked Questions section of the "[employer]" website at [email or web address] Remember, "[employer]" is NOT to be used for urgent needs. For medical emergencies, dial 911.

## 2017-12-07 NOTE — PROGRESS NOTES
Reviewed record in preparation for visit and have obtained necessary documentation. Identified pt with two pt identifiers(name and ). Health Maintenance Due   Topic    DTaP/Tdap/Td series (1 - Tdap)    Pneumococcal 65+ Low/Medium Risk (2 of 2 - PPSV23)    Influenza Age 5 to Adult          No chief complaint on file. Wt Readings from Last 3 Encounters:   17 171 lb (77.6 kg)   17 170 lb (77.1 kg)   17 175 lb (79.4 kg)     Temp Readings from Last 3 Encounters:   17 97.8 °F (36.6 °C) (Oral)   17 98.1 °F (36.7 °C) (Oral)   17 98.3 °F (36.8 °C) (Oral)     BP Readings from Last 3 Encounters:   17 120/62   17 116/60   16 157/69     Pulse Readings from Last 3 Encounters:   17 (!) 57   17 70   16 (!) 56           Learning Assessment:  :     Learning Assessment 2017   PRIMARY LEARNER Patient Patient Patient   PRIMARY LANGUAGE ENGLISH ENGLISH ENGLISH   LEARNER PREFERENCE PRIMARY DEMONSTRATION DEMONSTRATION READING   ANSWERED BY self patient patient   RELATIONSHIP SELF SELF SELF       Depression Screening:  :     PHQ over the last two weeks 2017   PHQ Not Done -   Little interest or pleasure in doing things Not at all   Feeling down, depressed or hopeless Not at all   Total Score PHQ 2 0       Fall Risk Assessment:  :     Fall Risk Assessment, last 12 mths 2017   Able to walk? Yes   Fall in past 12 months? No       Abuse Screening:  :     Abuse Screening Questionnaire 2017   Do you ever feel afraid of your partner? N   Are you in a relationship with someone who physically or mentally threatens you? N   Is it safe for you to go home?  Y       Coordination of Care Questionnaire:  :     1) Have you been to an emergency room, urgent care clinic since your last visit? no   Hospitalized since your last visit? no             2) Have you seen or consulted any other health care providers outside of [facility] since your last visit? no  (Include any pap smears or colon screenings in this section.)    3) Do you have an Advance Directive on file? yes    4) Are you interested in receiving information on Advance Directives? NO      Patient is accompanied by self I have received verbal consent from Jaylene Rios to discuss any/all medical information while they are present in the room.

## 2017-12-07 NOTE — PROGRESS NOTES
HISTORY OF PRESENT ILLNESS  Melissa Pham is a 80 y.o. female. HPI  Patient presents to the office for evaluation of her left breast and upper stomach pain. She reports she has been having left breast pain for a long time now. She states it is only of the left breast. She has had a mammogram in the fall which was normal. She does do self breast exams and has not been able to find a mass. She has been having increase stomach pain. She reports her  passed away months ago and since then she has not been eating as healthy. She states she eats out just about every night. She has been having pain of her upper stomach area and it radiates to the right side of her back as well. She denies nausea or vomiting. She has been having some gerd symptoms as well. She had a CT back in July and was told she does have gall stones. She is concerned because the pain is increasing. Review of Systems   Constitutional: Negative for chills and fever. Gastrointestinal: Positive for abdominal pain and constipation. Negative for nausea and vomiting. Genitourinary: Negative. Skin:        Left breast pain. Psychiatric/Behavioral: The patient has insomnia. Blood pressure 112/53, pulse 62, temperature 97.8 °F (36.6 °C), temperature source Oral, resp. rate 20, height 5' 5\" (1.651 m), weight 171 lb (77.6 kg), SpO2 99 %. Physical Exam   Abdominal: Soft. Bowel sounds are normal. She exhibits no distension and no mass. There is tenderness. There is no rebound and no guarding. Tenderness with palpation of epigastric and right upper abdomen. Skin:   Left breast- tenderness noted at approximately 9 o'clock. Not able to appreciate mass. ASSESSMENT and PLAN  Diagnoses and all orders for this visit:    1. Breast pain, left  -     US BREAST LT COMPLETE 4 QUAD; Future    2.  Pain of upper abdomen  -     METABOLIC PANEL, COMPREHENSIVE  -     LIPASE  -     AMYLASE    I suspect the increase in the abdominal pain is related in part to her eating a fatty diet at the fast food restaurants. She does have the history of gallstones. I believe she is having some gerd symptoms as well. She was given a print out about gerd and the foods she should avoid. If her symptoms do not improve with the change in diet she is to let us know. Labs ordered today and I will contact her with the results once they are back. Imaging of her breast has been ordered. I explained to the patient that they may request a diagnostic mammogram before doing an ultrasound. All this was discussed with the patient and she agrees and understands.

## 2017-12-08 LAB
ALBUMIN SERPL-MCNC: 4.3 G/DL (ref 3.5–4.7)
ALBUMIN/GLOB SERPL: 1.8 {RATIO} (ref 1.2–2.2)
ALP SERPL-CCNC: 83 IU/L (ref 39–117)
ALT SERPL-CCNC: 19 IU/L (ref 0–32)
AMYLASE SERPL-CCNC: 105 U/L (ref 31–124)
AST SERPL-CCNC: 20 IU/L (ref 0–40)
BILIRUB SERPL-MCNC: 0.3 MG/DL (ref 0–1.2)
BUN SERPL-MCNC: 22 MG/DL (ref 8–27)
BUN/CREAT SERPL: 37 (ref 12–28)
CALCIUM SERPL-MCNC: 9.6 MG/DL (ref 8.7–10.3)
CHLORIDE SERPL-SCNC: 99 MMOL/L (ref 96–106)
CO2 SERPL-SCNC: 26 MMOL/L (ref 18–29)
CREAT SERPL-MCNC: 0.6 MG/DL (ref 0.57–1)
GFR SERPLBLD CREATININE-BSD FMLA CKD-EPI: 86 ML/MIN/1.73
GFR SERPLBLD CREATININE-BSD FMLA CKD-EPI: 99 ML/MIN/1.73
GLOBULIN SER CALC-MCNC: 2.4 G/DL (ref 1.5–4.5)
GLUCOSE SERPL-MCNC: 92 MG/DL (ref 65–99)
LIPASE SERPL-CCNC: 30 U/L (ref 14–85)
POTASSIUM SERPL-SCNC: 4.8 MMOL/L (ref 3.5–5.2)
PROT SERPL-MCNC: 6.7 G/DL (ref 6–8.5)
SODIUM SERPL-SCNC: 142 MMOL/L (ref 134–144)

## 2017-12-08 NOTE — PROGRESS NOTES
Writer contacted patient to inform of lab results and instruction per Surinder Gore, patient verbalized understanding. Writer also informed patient to be expecting a phone call from the [address] [place] [address],[address] Dept to set up her appointment.

## 2017-12-08 NOTE — PROGRESS NOTES
Please let the patient know her labs look okay. Remind her to watch her diet and she may try zantac or pepcid for heart burn. She should see Dr. Lynette Doty if not feeling better.  thanks

## 2017-12-12 NOTE — TELEPHONE ENCOUNTER
Spoke with patient advise Please let the patient know her labs look okay. Remind her to watch her diet and she may try zantac or pepcid for heart burn. She should see Dr. Beverly Quiñonez if not feeling better.  Patient verbalized understanding

## 2018-01-25 ENCOUNTER — TELEPHONE (OUTPATIENT)
Dept: INTERNAL MEDICINE CLINIC | Age: 83
End: 2018-01-25

## 2018-01-25 NOTE — TELEPHONE ENCOUNTER
Pt is requesting a return call from nurse.  Best contact number is [phone].              From answering service

## 2018-03-16 ENCOUNTER — OFFICE VISIT (OUTPATIENT)
Dept: INTERNAL MEDICINE CLINIC | Age: 83
End: 2018-03-16

## 2018-03-16 VITALS
BODY MASS INDEX: 28.49 KG/M2 | HEIGHT: 65 IN | DIASTOLIC BLOOD PRESSURE: 70 MMHG | HEART RATE: 61 BPM | RESPIRATION RATE: 18 BRPM | TEMPERATURE: 97.5 F | SYSTOLIC BLOOD PRESSURE: 139 MMHG | OXYGEN SATURATION: 96 % | WEIGHT: 171 LBS

## 2018-03-16 DIAGNOSIS — I10 ESSENTIAL HYPERTENSION: ICD-10-CM

## 2018-03-16 DIAGNOSIS — E78.5 HYPERLIPIDEMIA WITH TARGET LDL LESS THAN 70: ICD-10-CM

## 2018-03-16 DIAGNOSIS — R10.12 LEFT UPPER QUADRANT PAIN: Primary | ICD-10-CM

## 2018-03-16 DIAGNOSIS — M25.562 CHRONIC PAIN OF LEFT KNEE: ICD-10-CM

## 2018-03-16 DIAGNOSIS — E05.90 HYPERTHYROIDISM: ICD-10-CM

## 2018-03-16 DIAGNOSIS — G89.29 CHRONIC PAIN OF LEFT KNEE: ICD-10-CM

## 2018-03-16 NOTE — MR AVS SNAPSHOT
[address] [place] [address] [address] [place] [address] 
[phone] Patient: Los Stanford MRN: [id number] EBB:[birth year] Visit Information Date & Time Provider Department Dept. Phone Encounter #  
 3/16/2018  1:30 PM Juan Petersen MD 0340 [facility] [phone] [id number] Follow-up Instructions Return if symptoms worsen or fail to improve. Upcoming Health Maintenance Date Due DTaP/Tdap/Td series (1 - Tdap) 12/20/1956 GLAUCOMA SCREENING Q2Y 12/20/2000 Pneumococcal 65+ Low/Medium Risk (2 of 2 - PPSV23) 1/1/2010 MEDICARE YEARLY EXAM 7/13/2018 Allergies as of 3/16/2018  Review Complete On: 3/16/2018 By: Juan Petersen MD  
 No Known Allergies Current Immunizations  Reviewed on 11/30/2010 Name Date Influenza High Dose Vaccine PF 11/4/2015 Influenza Vaccine Whole 10/1/2010 ZZZ-RETIRED (DO NOT USE) Pneumococcal Vaccine (Unspecified Type) 1/1/2005 Zoster 9/1/2010 Not reviewed this visit You Were Diagnosed With   
  
 Codes Comments Left upper quadrant pain    -  Primary ICD-10-CM: R10.12 ICD-9-CM: 789.02 Chronic pain of left knee     ICD-10-CM: M25.562, G89.29 ICD-9-CM: 719.46, 338.29 Essential hypertension     ICD-10-CM: I10 
ICD-9-CM: 401.9 Hyperthyroidism     ICD-10-CM: E05.90 ICD-9-CM: 242.90 Hyperlipidemia with target LDL less than 70     ICD-10-CM: E78.5 ICD-9-CM: 272.4 Vitals BP Pulse Temp Resp Height(growth percentile) Weight(growth percentile) 139/70 (BP 1 Location: Left arm, BP Patient Position: At rest) 61 97.5 °F (36.4 °C) (Oral) 18 5' 5\" (1.651 m) 171 lb (77.6 kg) SpO2 BMI OB Status Smoking Status 96% 28.46 kg/m2 Hysterectomy Never Smoker BMI and BSA Data Body Mass Index Body Surface Area  
 28.46 kg/m 2 1.89 m 2 Preferred Pharmacy Pharmacy Name Phone [id number]- Derek Mcfadden [phone] Your Updated Medication List  
  
   
This list is accurate as of 3/16/18  2:08 PM.  Always use your most recent med list.  
  
  
  
  
 aspirin 325 mg tablet Commonly known as:  ASPIRIN Take 325 mg by mouth daily. atorvastatin 20 mg tablet Commonly known as:  LIPITOR  
TAKE 1 TABLET BY MOUTH DAILY  
  
 bumetanide 1 mg tablet Commonly known as:  Cruz Sayesequiel FLUZONE HIGH-DOSE 2017-18 (PF) Syrg injection Generic drug:  influenza vaccine 2017-18 (65 yrs+)(PF)  
ADMINISTER 0.5ML IN THE MUSCLE AS DIRECTED  
  
 isosorbide mononitrate ER 30 mg tablet Commonly known as:  IMDUR Take 1 tablet by mouth  every morning  
  
 lisinopril 10 mg tablet Commonly known as:  Metta Lawman Take 0.5 Tabs by mouth daily. methIMAzole 5 mg tablet Commonly known as:  TAPAZOLE Take 1.5 Tabs by mouth daily. metoprolol tartrate 25 mg tablet Commonly known as:  LOPRESSOR Take one-half tablet by  mouth twice a day NITROSTAT 0.4 mg SL tablet Generic drug:  nitroglycerin DISSOLVE 1 TABLET UNDER TONGUE EVERY 5 MINUTES AS NEEDED FOR CHEST PAIN  
  
 PLAVIX 75 mg Tab Generic drug:  clopidogrel Take 75 mg by mouth daily. TYLENOL EXTRA STRENGTH 500 mg tablet Generic drug:  acetaminophen Take  by mouth every six (6) hours as needed for Pain. VAGIFEM 10 mcg Tab vaginal tablet Generic drug:  estradiol USE ON TABLET VAGINALLY ON TUESDAYS AND Corby Evangelista We Performed the Following REFERRAL TO ORTHOPEDICS [WAM289 Custom] Follow-up Instructions Return if symptoms worsen or fail to improve. Referral Information Referral ID Referred By Referred To  
  
 [id number] [address] [facility] Phone: [phone] Fax: [phone] Visits Status Start Date End Date 1 New Request 3/16/18 3/16/19 If your referral has a status of pending review or denied, additional information will be sent to support the outcome of this decision. Introducing [facility] SERVICES! Tiff Steinberg introduces [employer] patient portal. Now you can access parts of your medical record, email your doctor's office, and request medication refills online. 1. In your internet browser, go to [email or web address] 2. Click on the First Time User? Click Here link in the Sign In box. You will see the New Member Sign Up page. 3. Enter your Unsocial Access Code exactly as it appears below. You will not need to use this code after youve completed the sign-up process. If you do not sign up before the expiration date, you must request a new code. · Unsocial Access Code: [email or web address] Expires: 6/14/2018  2:08 PM 
 
4. Enter the last four digits of your Social Security Number (xxxx) and Date of Birth (mm/dd/yyyy) as indicated and click Submit. You will be taken to the next sign-up page. 5. Create a Unsocial ID. This will be your Unsocial login ID and cannot be changed, so think of one that is secure and easy to remember. 6. Create a Unsocial password. You can change your password at any time. 7. Enter your Password Reset Question and Answer. This can be used at a later time if you forget your password. 8. Enter your e-mail address. You will receive e-mail notification when new information is available in [address] 9. Click Sign Up. You can now view and download portions of your medical record. 10. Click the Download Summary menu link to download a portable copy of your medical information. If you have questions, please visit the Frequently Asked Questions section of the Un[employer] website. Remember, Unsocial is NOT to be used for urgent needs. For medical emergencies, dial 911. Now available from your iPhone and Android! Please provide this summary of care documentation to your next provider. Your primary care clinician is listed as [address] [address] [place] [address] [address]. If you have any questions after today's visit, please call [phone].

## 2018-04-19 ENCOUNTER — TELEPHONE (OUTPATIENT)
Dept: INTERNAL MEDICINE CLINIC | Age: 83
End: 2018-04-19

## 2018-04-19 NOTE — TELEPHONE ENCOUNTER
Spoke with patient. Patient request to get a disability parking parking pass. Advised will submit request to doctor.

## 2018-04-19 NOTE — TELEPHONE ENCOUNTER
Patient called in requesting a call back.  She neglected to state the nature of the call  [phone]

## 2018-05-18 ENCOUNTER — OFFICE VISIT (OUTPATIENT)
Dept: INTERNAL MEDICINE CLINIC | Age: 83
End: 2018-05-18

## 2018-05-18 ENCOUNTER — TELEPHONE (OUTPATIENT)
Dept: INTERNAL MEDICINE CLINIC | Age: 83
End: 2018-05-18

## 2018-05-18 VITALS
HEIGHT: 65 IN | SYSTOLIC BLOOD PRESSURE: 99 MMHG | HEART RATE: 66 BPM | BODY MASS INDEX: 28.46 KG/M2 | DIASTOLIC BLOOD PRESSURE: 51 MMHG | RESPIRATION RATE: 18 BRPM | TEMPERATURE: 98.2 F | WEIGHT: 170.8 LBS | OXYGEN SATURATION: 93 %

## 2018-05-18 DIAGNOSIS — H57.12 LEFT EYE PAIN: Primary | ICD-10-CM

## 2018-05-18 DIAGNOSIS — M25.562 CHRONIC PAIN OF LEFT KNEE: ICD-10-CM

## 2018-05-18 DIAGNOSIS — R10.12 LEFT UPPER QUADRANT PAIN: ICD-10-CM

## 2018-05-18 DIAGNOSIS — G89.29 CHRONIC PAIN OF LEFT KNEE: ICD-10-CM

## 2018-05-18 DIAGNOSIS — M94.0 COSTOCHONDRITIS: ICD-10-CM

## 2018-05-18 RX ORDER — METHYLPREDNISOLONE 4 MG/1
TABLET ORAL
Qty: 1 DOSE PACK | Refills: 0 | Status: SHIPPED | OUTPATIENT
Start: 2018-05-18 | End: 2018-09-05 | Stop reason: ALTCHOICE

## 2018-05-18 NOTE — PROGRESS NOTES
HISTORY OF PRESENT ILLNESS  Todd Richardson is a 80 y.o. female. HPI  Patient presents to the office for a number of concerns. 1. She has been having some left eye pain for about 2-3 days. She states she is not able to tell if it is the eye lid or the eye ball itself. She has had eye cataract surgery in the past. She denies trauma or foreign body to the eye. She states sometimes it seems like it radiates and gives her a headache. 2. She has a history of gallstones and they do cause pain from time to time. Lately she has been having pain left side upper abdomen pain. The pain is not constant. 3. She has been having some left side chest pain. The wall of her chest is tender to the touch. She was seen at the ER on 5/14 for chest pain. She reports they felt her heart was okay and she is scheduled to see her cardiologist next week 4. She is having left knee pain. This knee pain has been persistent for some time. She has not seen ortho for this in the past. She is interested in having an injection of the knee. Review of Systems   Eyes: Positive for blurred vision, photophobia and pain. Negative for double vision, discharge and redness. Cardiovascular: Positive for chest pain. Gastrointestinal: Positive for abdominal pain. Negative for nausea and vomiting. Musculoskeletal: Positive for joint pain. Blood pressure 99/51, pulse 66, temperature 98.2 °F (36.8 °C), temperature source Oral, resp. rate 18, height 5' 5\" (1.651 m), weight 170 lb 12.8 oz (77.5 kg), SpO2 93 %. Physical Exam   Eyes: EOM are normal. Pupils are equal, round, and reactive to light. Cardiovascular: Normal rate and regular rhythm. Pulmonary/Chest: She has no wheezes. She exhibits tenderness. Tenderness with palpation of the chest wall on the left side. Abdominal: Soft. Bowel sounds are normal. There is tenderness. Left upper quadrant tenderness       ASSESSMENT and PLAN  Diagnoses and all orders for this visit:    1.  Left eye pain  -     REFERRAL TO OPHTHALMOLOGY    2. Left upper quadrant pain  -     METABOLIC PANEL, COMPREHENSIVE  -     AMYLASE  -     LIPASE    3. Chronic pain of left knee  -     methylPREDNISolone (MEDROL DOSEPACK) 4 mg tablet; Use as directed. 4. Costochondritis  -     methylPREDNISolone (MEDROL DOSEPACK) 4 mg tablet; Use as directed. 1. I will contact Dr. Alvaro Barrios office to try to get her an appointment to have her eye's checked. She understands if the pain changes or increases she should follow up. 2. Patient to get labs done. She will be contacted with the lab results. 3. Trial of medrol for both the knee and possible costochondritis. If knee not better, I will have the patient to see Dr. Mary Watkins for possible knee injection. The patient will be contact with the labs. All this discussed with the patient and she understands and agrees.

## 2018-05-18 NOTE — LETTER
5/22/2018 11:35 AM 
 
Ms. Andrea Roles [id number] [address] [id number] Dear Zully Roles: 
 
Please find your most recent results below. Bun is just a little elevated. Lipase and amylase was negative. If you are still having problems please call to make follow up with Dr. Suzan Apodaca or Dr. Ese Huerta METABOLIC PANEL, COMPREHENSIVE Result Value Ref Range Glucose 95 65 - 99 mg/dL BUN 29 (H) 8 - 27 mg/dL Creatinine 0.67 0.57 - 1.00 mg/dL GFR est non-AA 82 >59 mL/min/1.73 GFR est AA 95 >59 mL/min/1.73  
 BUN/Creatinine ratio 43 (H) 12 - 28 Sodium 144 134 - 144 mmol/L Potassium 5.2 3.5 - 5.2 mmol/L Chloride 101 96 - 106 mmol/L  
 CO2 24 18 - 29 mmol/L Calcium 10.2 8.7 - 10.3 mg/dL Protein, total 6.9 6.0 - 8.5 g/dL Albumin 4.2 3.5 - 4.7 g/dL GLOBULIN, TOTAL 2.7 1.5 - 4.5 g/dL A-G Ratio 1.6 1.2 - 2.2 Bilirubin, total 0.3 0.0 - 1.2 mg/dL Alk. phosphatase 77 39 - 117 IU/L  
 AST (SGOT) 21 0 - 40 IU/L  
 ALT (SGPT) 17 0 - 32 IU/L Narrative Performed at:  [address]  [id number] : Amaris Liang MD, Phone:  [phone] AMYLASE Result Value Ref Range Amylase 108 31 - 124 U/L Narrative Performed at:  [address]  [id number] : Amaris Liang MD, Phone:  [phone] LIPASE Result Value Ref Range Lipase 31 14 - 85 U/L Narrative Performed at:  [address]  [id number] : Amaris Liang MD, Phone:  [phone] Please call me if you have any questions: [phone] Sincerely, 
 
 
Dav Frances PA-C

## 2018-05-18 NOTE — MR AVS SNAPSHOT
[address] [place] [address] Drive [address] [place] [address] 
[phone] Patient: Alyana Haile MRN: [id number] ZPR:94/38/3891 Visit Information Date & Time Provider Department Dept. Phone Encounter #  
 5/18/2018  9:30 AM Rajani Vázquez PA-C [facility] [phone] [id number] Upcoming Health Maintenance Date Due DTaP/Tdap/Td series (1 - Tdap) 12[birth year]20/1956 GLAUCOMA SCREENING Q2Y 12/20/2000 Pneumococcal 65+ Low/Medium Risk (2 of 2 - PPSV23) 1/1/2010 MEDICARE YEARLY EXAM 7/13/2018 Influenza Age 5 to Adult 8/1/2018 Allergies as of 5/18/2018  Review Complete On: 5/18/2018 By: Chitra Langley No Known Allergies Current Immunizations  Reviewed on 11/30/2010 Name Date Influenza High Dose Vaccine PF 11/4/2015 Influenza Vaccine Whole 10/1/2010 ZZZ-RETIRED (DO NOT USE) Pneumococcal Vaccine (Unspecified Type) 1/1/2005 Zoster 9/1/2010 Not reviewed this visit You Were Diagnosed With   
  
 Codes Comments Left eye pain    -  Primary ICD-10-CM: H57.12 
ICD-9-CM: 379.91 Left upper quadrant pain     ICD-10-CM: R10.12 ICD-9-CM: 789.02 Chronic pain of left knee     ICD-10-CM: M25.562, G89.29 ICD-9-CM: 719.46, 338.29 Costochondritis     ICD-10-CM: M94.0 ICD-9-CM: 733.6 Vitals BP Pulse Temp Resp Height(growth percentile) Weight(growth percentile) 99/51 66 98.2 °F (36.8 °C) (Oral) 18 5' 5\" (1.651 m) 170 lb 12.8 oz (77.5 kg) SpO2 BMI OB Status Smoking Status 93% 28.42 kg/m2 Hysterectomy Never Smoker BMI and BSA Data Body Mass Index Body Surface Area  
 28.42 kg/m 2 1.89 m 2 Preferred Pharmacy Pharmacy Name Phone [id number] [address] [facility] [address] [phone] Your Updated Medication List  
  
   
This list is accurate as of 5/18/18 10:36 AM.  Always use your most recent med list.  
  
  
  
  
 aspirin 325 mg tablet Commonly known as:  ASPIRIN Take 325 mg by mouth daily. atorvastatin 20 mg tablet Commonly known as:  LIPITOR  
TAKE 1 TABLET BY MOUTH DAILY  
  
 bumetanide 1 mg tablet Commonly known as:  Marietta Lyons FLUZONE HIGH-DOSE 2017-18 (PF) Syrg injection Generic drug:  influenza vaccine 2017-18 (65 yrs+)(PF)  
ADMINISTER 0.5ML IN THE MUSCLE AS DIRECTED  
  
 isosorbide mononitrate ER 30 mg tablet Commonly known as:  IMDUR Take 1 tablet by mouth  every morning  
  
 lisinopril 10 mg tablet Commonly known as:  Tildon Theodore Take 0.5 Tabs by mouth daily. methIMAzole 5 mg tablet Commonly known as:  TAPAZOLE Take 1.5 Tabs by mouth daily. methylPREDNISolone 4 mg tablet Commonly known as:  Jordan Logan Use as directed. metoprolol tartrate 25 mg tablet Commonly known as:  LOPRESSOR Take one-half tablet by  mouth twice a day NITROSTAT 0.4 mg SL tablet Generic drug:  nitroglycerin DISSOLVE 1 TABLET UNDER TONGUE EVERY 5 MINUTES AS NEEDED FOR CHEST PAIN  
  
 PLAVIX 75 mg Tab Generic drug:  clopidogrel Take 75 mg by mouth daily. TYLENOL EXTRA STRENGTH 500 mg tablet Generic drug:  acetaminophen Take  by mouth every six (6) hours as needed for Pain. VAGIFEM 10 mcg Tab vaginal tablet Generic drug:  estradiol USE ON TABLET VAGINALLY ON TUESDAYS AND Winthrop Longs Prescriptions Printed Refills  
 methylPREDNISolone (MEDROL DOSEPACK) 4 mg tablet 0 Sig: Use as directed. Class: Print We Performed the Following AMYLASE [id number] CPT(R)] LIPASE [id number] CPT(R)] METABOLIC PANEL, COMPREHENSIVE [88200 CPT(R)] REFERRAL TO OPHTHALMOLOGY [REF57 Custom] Referral Information Referral ID Referred By Referred To  
  
 [id number] Richy Reyna MD   
   [address] [facility] [address] [place] [address], 1 [facility] Phone: [phone] Fax: [phone] Visits Status Start Date End Date 1 New Request 5/18/18 5/18/19 If your referral has a status of pending review or denied, additional information will be sent to support the outcome of this decision. Introducing [facility] HE[facility] SERVICES! Silvia Guillory introduces [employer] patient portal. Now you can access parts of your medical record, email your doctor's office, and request medication refills online. 1. In your internet browser, go to [email or web address] 2. Click on the First Time User? Click Here link in the Sign In box. You will see the New Member Sign Up page. 3. Enter your [employer] Access Code exactly as it appears below. You will not need to use this code after youve completed the sign-up process. If you do not sign up before the expiration date, you must request a new code. · [employer] Access Code: [email or web address] Expires: 6/14/2018  2:08 PM 
 
4. Enter the last four digits of your Social Security Number (xxxx) and Date of Birth (mm/dd/yyyy) as indicated and click Submit. You will be taken to the next sign-up page. 5. Create a [employer] ID. This will be your [employer] login ID and cannot be changed, so think of one that is secure and easy to remember. 6. Create a [employer] password. You can change your password at any time. 7. Enter your Password Reset Question and Answer. This can be used at a later time if you forget your password. 8. Enter your e-mail address. You will receive e-mail notification when new information is available in [address] 9. Click Sign Up. You can now view and download portions of your medical record. 10. Click the Download Summary menu link to download a portable copy of your medical information. If you have questions, please visit the Frequently Asked Questions section of the [employer] website. Remember, [employer] is NOT to be used for urgent needs. For medical emergencies, dial 911. Now available from your iPhone and Android! Please provide this summary of care documentation to your next provider. Your primary care clinician is listed as [phone] [address] [place] [address] [address]. If you have any questions after today's visit, please call [phone].

## 2018-05-18 NOTE — PROGRESS NOTES
Chief Complaint   Patient presents with    Eye Pain     (left side)few days w/ blurried vision and H/A    Knee Pain     left ongoing    Side Pain     left off and on     1. Have you been to the ER, urgent care clinic since your last visit? Hospitalized since your last visit? No    2. Have you seen or consulted any other health care providers outside of the [address] [place] [address] since your last visit? Include any pap smears or colon screening.  No

## 2018-05-18 NOTE — TELEPHONE ENCOUNTER
I have placed a call to Dr. Parviz Acuña office to make an appointment for the patient. His office is gong to reach out to her about this. She had requested a Thursday appointment but unless she is willing to drive to [facility], she may have to pick a different day.

## 2018-05-19 LAB
ALBUMIN SERPL-MCNC: 4.2 G/DL (ref 3.5–4.7)
ALBUMIN/GLOB SERPL: 1.6 {RATIO} (ref 1.2–2.2)
ALP SERPL-CCNC: 77 IU/L (ref 39–117)
ALT SERPL-CCNC: 17 IU/L (ref 0–32)
AMYLASE SERPL-CCNC: 108 U/L (ref 31–124)
AST SERPL-CCNC: 21 IU/L (ref 0–40)
BILIRUB SERPL-MCNC: 0.3 MG/DL (ref 0–1.2)
BUN SERPL-MCNC: 29 MG/DL (ref 8–27)
BUN/CREAT SERPL: 43 (ref 12–28)
CALCIUM SERPL-MCNC: 10.2 MG/DL (ref 8.7–10.3)
CHLORIDE SERPL-SCNC: 101 MMOL/L (ref 96–106)
CO2 SERPL-SCNC: 24 MMOL/L (ref 18–29)
CREAT SERPL-MCNC: 0.67 MG/DL (ref 0.57–1)
GFR SERPLBLD CREATININE-BSD FMLA CKD-EPI: 82 ML/MIN/1.73
GFR SERPLBLD CREATININE-BSD FMLA CKD-EPI: 95 ML/MIN/1.73
GLOBULIN SER CALC-MCNC: 2.7 G/DL (ref 1.5–4.5)
GLUCOSE SERPL-MCNC: 95 MG/DL (ref 65–99)
LIPASE SERPL-CCNC: 31 U/L (ref 14–85)
POTASSIUM SERPL-SCNC: 5.2 MMOL/L (ref 3.5–5.2)
PROT SERPL-MCNC: 6.9 G/DL (ref 6–8.5)
SODIUM SERPL-SCNC: 144 MMOL/L (ref 134–144)

## 2018-05-21 NOTE — PROGRESS NOTES
Bun is just a little elevated. Lipase and amylase was negative. If she I still having problems have her to follow up with one of the physicians.  thanks

## 2018-06-06 RX ORDER — ESTRADIOL 10 UG/1
INSERT VAGINAL
Qty: 24 TAB | Refills: 0 | Status: SHIPPED | OUTPATIENT
Start: 2018-06-06 | End: 2019-01-29 | Stop reason: SDUPTHER

## 2018-06-11 ENCOUNTER — TELEPHONE (OUTPATIENT)
Dept: INTERNAL MEDICINE CLINIC | Age: 83
End: 2018-06-11

## 2018-06-27 ENCOUNTER — OFFICE VISIT (OUTPATIENT)
Dept: INTERNAL MEDICINE CLINIC | Age: 83
End: 2018-06-27

## 2018-06-27 VITALS
HEIGHT: 65 IN | DIASTOLIC BLOOD PRESSURE: 72 MMHG | TEMPERATURE: 98.6 F | SYSTOLIC BLOOD PRESSURE: 122 MMHG | BODY MASS INDEX: 28.32 KG/M2 | HEART RATE: 72 BPM | OXYGEN SATURATION: 97 % | WEIGHT: 170 LBS | RESPIRATION RATE: 20 BRPM

## 2018-06-27 DIAGNOSIS — I10 ESSENTIAL HYPERTENSION: ICD-10-CM

## 2018-06-27 DIAGNOSIS — M17.10 KNEE ARTHROPATHY: Primary | ICD-10-CM

## 2018-06-27 RX ORDER — DICLOFENAC SODIUM 10 MG/G
2 GEL TOPICAL 3 TIMES DAILY
Qty: 100 EACH | Refills: 5 | Status: SHIPPED | OUTPATIENT
Start: 2018-06-27 | End: 2018-09-05

## 2018-06-27 RX ORDER — TRIAMCINOLONE ACETONIDE 40 MG/ML
40 INJECTION, SUSPENSION INTRA-ARTICULAR; INTRAMUSCULAR ONCE
Qty: 1 ML | Refills: 0
Start: 2018-06-27 | End: 2018-06-27

## 2018-06-27 NOTE — PROGRESS NOTES
SUBJECTIVE:  Clint Lopez is a 80 y.o. female who sustained a left knee pain 1 year(s) ago. Mechanism of injury: none. Immediate symptoms: delayed pain, delayed swelling. Symptoms have been chronic since that time. Prior history of related problems: no prior problems with this area in the past.  Patient Active Problem List    Diagnosis    Living will, counseling/discussion    Coronary artery disease involving autologous artery coronary bypass graft without angina pectoris     Stent 7/ 2015      Osteopenia     2010 hip -0.7.  Hyperthyroidism     Followed by Dr Saundra Linda.  HTN (hypertension)    Hyperlipidemia with target LDL less than 70     Past Surgical History:   Procedure Laterality Date    BIOPSY OF BREAST, INCISIONAL  1985    LEFT breast    CABG, ARTERY-VEIN, FOUR  2000    heart surgery at [address] [place] [address]      dr Chuck Tiwari HX COLONOSCOPY  2011    HX HYSTERECTOMY  2003    HX BRIAN AND BSO      2002    HX TRACHEOSTOMY      whooping cough age 3    NE ANESTH,KNEE AREA SURGERY      tkr, right; 2008       OBJECTIVE:  Vital signs as noted above. Appearance: alert, well appearing, and in no distress. Knee exam: reduced range of motion. X-ray: not available. 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 A steroid injection was performed at medial left knee using 1% plain Lidocaine and 40 mg of Kenalog. This was well tolerated. Was  Hard to get opening      1. Knee arthropathy  DJD  - XR KNEE LT MAX 2 VWS; Future  - TRIAMCINOLONE ACETONIDE INJ  - triamcinolone acetonide (KENALOG) 40 mg/mL injection; 1 mL by Intra artICUlar route once for 1 dose. Dispense: 1 mL; Refill: 0  - 54516 - DRAIN/INJECT LARGE JOINT/BURSA  Trial diclofenac gel  2.  Essential hypertension  controlled        [facility]  OFFICE PROCEDURE PROGRESS NOTE        Chart reviewed for the following:   Idalia LIZAMA MD, have reviewed the History, Physical and updated the Allergic reactions for Gabriela Creamer     TIME OUT performed immediately prior to start of procedure:   Idalia LIZAMA MD, have performed the following reviews on Gabriela Creamer prior to the start of the procedure:            * Patient was identified by name and date of birth   * Agreement on procedure being performed was verified  * Risks and Benefits explained to the patient  * Procedure site verified and marked as necessary  * Patient was positioned for comfort  * Consent was signed and verified     Time: 4;30      Date of procedure: 6/27/2018    Procedure performed by:  Idalia Frye MD    Provider assisted by:LPN    Patient assisted by: self    How tolerated by patient: tolerated the procedure well with no complications    Post Procedural Pain Scale: 2 - Hurts Little Bit    Comments: none        Ortho opinion discussed  Diagnoses and all orders for this visit:    1. Knee arthropathy  -     XR KNEE LT MAX 2 VWS; Future  -     TRIAMCINOLONE ACETONIDE INJ  -     triamcinolone acetonide (KENALOG) 40 mg/mL injection; 1 mL by Intra artICUlar route once for 1 dose. -     20610 - DRAIN/INJECT LARGE JOINT/BURSA    2. Essential hypertension    Other orders  -     diclofenac (VOLTAREN) 1 % gel; Apply 2 g to affected area three (3) times daily.

## 2018-09-05 ENCOUNTER — OFFICE VISIT (OUTPATIENT)
Dept: INTERNAL MEDICINE CLINIC | Age: 83
End: 2018-09-05

## 2018-09-05 VITALS
WEIGHT: 170.6 LBS | DIASTOLIC BLOOD PRESSURE: 69 MMHG | HEIGHT: 65 IN | RESPIRATION RATE: 16 BRPM | TEMPERATURE: 97.4 F | SYSTOLIC BLOOD PRESSURE: 131 MMHG | OXYGEN SATURATION: 97 % | HEART RATE: 64 BPM | BODY MASS INDEX: 28.42 KG/M2

## 2018-09-05 DIAGNOSIS — S03.00XA DISLOCATION OF TEMPOROMANDIBULAR JOINT, INITIAL ENCOUNTER: ICD-10-CM

## 2018-09-05 DIAGNOSIS — R10.13 EPIGASTRIC PAIN: Primary | ICD-10-CM

## 2018-09-05 PROBLEM — K80.70 CALCULUS OF GALLBLADDER AND BILE DUCT WITHOUT CHOLECYSTITIS: Status: ACTIVE | Noted: 2018-09-05

## 2018-09-05 NOTE — PROGRESS NOTES
Health Maintenance Due   Topic Date Due    DTaP/Tdap/Td series (1 - Tdap) 12/20/1956    GLAUCOMA SCREENING Q2Y  12/20/2000    Pneumococcal 65+ Low/Medium Risk (2 of 2 - PPSV23) 01/01/2010    MEDICARE YEARLY EXAM  07/13/2018    Influenza Age 9 to Adult  08/01/2018       Chief Complaint   Patient presents with    Abdominal Pain       1. Have you been to the ER, urgent care clinic since your last visit? Hospitalized since your last visit? No    2. Have you seen or consulted any other health care providers outside of the [facility] since your last visit? Include any pap smears or colon screening. No    3) Do you have an Advance Directive on file? no    4) Are you interested in receiving information on Advance Directives? NO      Patient is accompanied by self I have received verbal consent from Shahriar Rogers to discuss any/all medical information while they are present in the room.

## 2018-09-05 NOTE — PROGRESS NOTES
Chief Complaint   Patient presents with    Abdominal Pain    Ear Pain    Head Pain       Subjective: This is an 80year old female with a chief complaint of abdominal pain, secondary complaint of right ear pain. Abdominal pain has been coming and going for many months, in fact had CAT scan for abdominal pain a year ago under Dr. Laura Lipscomb. At that time they were concerned she could have something like pancreatic cancer. She has concerns about pancreatic cancer because her   of this. She was noted what was felt to be asymptomatic cholelithiasis at the time and she was seen to have atherosclerosis. She has no weight change, no vomiting. She describes epigastric pain sometimes radiating into her chest.  Sometimes related to eating, sometimes not related to eating. She describes no change in bowels, but chronic constipation. No relief using Miralax. Doesn't use a lot of fiber, eats a lot of vegetables. Denies bloating. Denies diarrhea. Denies change in color of urine. Denies blood in the urine. Denies blood in the stool . Denies weight loss. Pain is on and off, but more bothersome, at times is worse. She does not attribute it to anxiety. The pain related to her right ear has been bothering her for four days. She's had pain in the jaw area of the right area, radiating up to the temple area, unrelated to eating. No upper respiratory symptoms. Examination:  Her exam showed her to have TMJ on the right with tenderness with opening and closing mouth. Dentition looked normal.  Eardrums on both sides left and right were normal.  There is no wax. Chest was clear. Bowel sounds active, no bruits. There was slight epigastric tenderness. Impression/Plan:  1. Abdominal pain, nonspecific. Certainly she could be having atypical symptoms related to her cholelithiasis that was seen on CT.   Will get an ultrasound to see if there is thickening or any changes in the biliary ducts or if there is any pancreatic changes. I didn't think she needed a CT. There has been no weight loss. I did recommend a trial of Omeprazole, one daily for two weeks. I did recommend a GI consult for endoscopy if these symptoms continue, and gave her the name of Ernie Lockett, and also she was thinking about seeing a GI at [address] [place] [address] and I gave her the phone number for the [facility] at [facility]. 2. For TMJ she'll use warm compresses and she can use Tylenol and/or Advil. It doesn't sound like she's had vascular ischemia in her mesenteric artery as the cause of her pain. Vitals:    09/05/18 1107   BP: 131/69   Pulse: 64   Resp: 16   Temp: 97.4 °F (36.3 °C)   TempSrc: Oral   SpO2: 97%   Weight: 170 lb 9.6 oz (77.4 kg)   Height: 5' 5\" (1.651 m)     Wt Readings from Last 3 Encounters:   09/05/18 170 lb 9.6 oz (77.4 kg)   06/27/18 170 lb (77.1 kg)   05/18/18 170 lb 12.8 oz (77.5 kg)     Patient Active Problem List    Diagnosis    Calculus of gallbladder and bile duct without cholecystitis    Living will, counseling/discussion    Coronary artery disease involving autologous artery coronary bypass graft without angina pectoris     Stent 7/ 2015      Osteopenia     2010 hip -0.7.  Hyperthyroidism     Followed by Dr Yakelin Nguyen.  HTN (hypertension)    Hyperlipidemia with target LDL less than 70     Diagnoses and all orders for this visit:    1. Epigastric pain  -     US ABD COMP; Future  -     Devante Hanson [facility]    2.  Dislocation of temporomandibular joint, initial encounter

## 2018-09-11 ENCOUNTER — TELEPHONE (OUTPATIENT)
Dept: INTERNAL MEDICINE CLINIC | Age: 83
End: 2018-09-11

## 2018-09-11 NOTE — TELEPHONE ENCOUNTER
Pt is calling had some test done at [address] on yesterday and wants a call back ASAP with the results she is leaving within an hour per patient she was told that she was to receive the results on yesterday and would like to hear back from nurse before she leaves, pt can be reached at [phone]

## 2018-09-11 NOTE — TELEPHONE ENCOUNTER
I reviewed US with her    Normal except for stone  She will live with a little discomfort has seen surgery already

## 2018-10-20 NOTE — PROGRESS NOTES
Writer attempted to reach patient, no answer, letter sent, LM on VM to return call to the office. TENDERNESS/BACK PAIN

## 2018-11-08 ENCOUNTER — OFFICE VISIT (OUTPATIENT)
Dept: INTERNAL MEDICINE CLINIC | Age: 83
End: 2018-11-08

## 2018-11-08 VITALS
DIASTOLIC BLOOD PRESSURE: 64 MMHG | HEART RATE: 64 BPM | WEIGHT: 170 LBS | HEIGHT: 65 IN | OXYGEN SATURATION: 100 % | BODY MASS INDEX: 28.32 KG/M2 | TEMPERATURE: 96.1 F | RESPIRATION RATE: 16 BRPM | SYSTOLIC BLOOD PRESSURE: 108 MMHG

## 2018-11-08 DIAGNOSIS — Z13.39 SCREENING FOR ALCOHOLISM: ICD-10-CM

## 2018-11-08 DIAGNOSIS — M54.2 NECK PAIN: ICD-10-CM

## 2018-11-08 DIAGNOSIS — Z00.00 MEDICARE ANNUAL WELLNESS VISIT, SUBSEQUENT: ICD-10-CM

## 2018-11-08 DIAGNOSIS — Z23 ENCOUNTER FOR IMMUNIZATION: Primary | ICD-10-CM

## 2018-11-08 DIAGNOSIS — Z13.31 SCREENING FOR DEPRESSION: ICD-10-CM

## 2018-11-08 DIAGNOSIS — I10 ESSENTIAL HYPERTENSION: ICD-10-CM

## 2018-11-08 DIAGNOSIS — M25.562 CHRONIC PAIN OF LEFT KNEE: ICD-10-CM

## 2018-11-08 DIAGNOSIS — E05.90 HYPERTHYROIDISM: ICD-10-CM

## 2018-11-08 DIAGNOSIS — G89.29 CHRONIC PAIN OF LEFT KNEE: ICD-10-CM

## 2018-11-08 RX ORDER — ASPIRIN 81 MG/1
81 TABLET ORAL DAILY
Qty: 100 TAB | Refills: 5
Start: 2018-11-08 | End: 2019-03-14 | Stop reason: DRUGHIGH

## 2018-11-08 NOTE — PATIENT INSTRUCTIONS
Medicare Wellness Visit, Female The best way to live healthy is to have a lifestyle where you eat a well-balanced diet, exercise regularly, limit alcohol use, and quit all forms of tobacco/nicotine, if applicable. Regular preventive services are another way to keep healthy. Preventive services (vaccines, screening tests, monitoring & exams) can help personalize your care plan, which helps you manage your own care. Screening tests can find health problems at the earliest stages, when they are easiest to treat. [facility] follows the current, evidence-based guidelines published by the [facility] Manny Syd ([facility]) when recommending preventive services for our patients. Because we follow these guidelines, sometimes recommendations change over time as research supports it. (For example, mammograms used to be recommended annually. Even though Medicare will still pay for an annual mammogram, the newer guidelines recommend a mammogram every two years for women of average risk.) Of course, you and your doctor may decide to screen more often for some diseases, based on your risk and your health status. Preventive services for you include: - Medicare offers their members a free annual wellness visit, which is time for you and your primary care provider to discuss and plan for your preventive service needs. Take advantage of this benefit every year! 
-All adults over the age of 72 should receive the recommended pneumonia vaccines. Current USPSTF guidelines recommend a series of two vaccines for the best pneumonia protection.  
-All adults should have a flu vaccine yearly and a tetanus vaccine every 10 years. All adults age 61 and older should receive a shingles vaccine once in their lifetime.   
-A bone mass density test is recommended when a woman turns 65 to screen for osteoporosis. This test is only recommended one time, as a screening. Some providers will use this same test as a disease monitoring tool if you already have osteoporosis. -All adults age 38-68 who are overweight should have a diabetes screening test once every three years.  
-Other screening tests and preventive services for persons with diabetes include: an eye exam to screen for diabetic retinopathy, a kidney function test, a foot exam, and stricter control over your cholesterol.  
-Cardiovascular screening for adults with routine risk involves an electrocardiogram (ECG) at intervals determined by your doctor.  
-Colorectal cancer screenings should be done for adults age 54-65 with no increased risk factors for colorectal cancer. There are a number of acceptable methods of screening for this type of cancer. Each test has its own benefits and drawbacks. Discuss with your doctor what is most appropriate for you during your annual wellness visit. The different tests include: colonoscopy (considered the best screening method), a fecal occult blood test, a fecal DNA test, and sigmoidoscopy. -Breast cancer screenings are recommended every other year for women of normal risk, age 54-69. 
-Cervical cancer screenings for women over age 72 are only recommended with certain risk factors.  
-All adults born between [facility] should be screened once for Hepatitis C. Here is a list of your current Health Maintenance items (your personalized list of preventive services) with a due date: 
Health Maintenance Due Topic Date Due  
 DTaP/Tdap/Td  (1 - Tdap) 12/20/1956  Shingles Vaccine (1 of 2) 12/20/1985  Glaucoma Screening   12/20/2000  Pneumococcal Vaccine (2 of 2 - PPSV23) 01/01/2010 [facility] Annual Well Visit  07/13/2018  Flu Vaccine  08/01/2018 Medicare Wellness Visit, Female The best way to live healthy is to have a lifestyle where you eat a well-balanced diet, exercise regularly, limit alcohol use, and quit all forms of tobacco/nicotine, if applicable. Regular preventive services are another way to keep healthy. Preventive services (vaccines, screening tests, monitoring & exams) can help personalize your care plan, which helps you manage your own care. Screening tests can find health problems at the earliest stages, when they are easiest to treat. [facility] follows the current, evidence-based guidelines published by the [facility] Manny Velarde (USPSTF) when recommending preventive services for our patients. Because we follow these guidelines, sometimes recommendations change over time as research supports it. (For example, mammograms used to be recommended annually. Even though Medicare will still pay for an annual mammogram, the newer guidelines recommend a mammogram every two years for women of average risk.) Of course, you and your doctor may decide to screen more often for some diseases, based on your risk and your health status. Preventive services for you include: - Medicare offers their members a free annual wellness visit, which is time for you and your primary care provider to discuss and plan for your preventive service needs. Take advantage of this benefit every year! 
-All adults over the age of 72 should receive the recommended pneumonia vaccines. Current USPSTF guidelines recommend a series of two vaccines for the best pneumonia protection.  
-All adults should have a flu vaccine yearly and a tetanus vaccine every 10 years. All adults age 61 and older should receive a shingles vaccine once in their lifetime.   
-A bone mass density test is recommended when a woman turns 65 to screen for osteoporosis. This test is only recommended one time, as a screening. Some providers will use this same test as a disease monitoring tool if you already have osteoporosis. -All adults age 38-68 who are overweight should have a diabetes screening test once every three years. -Other screening tests and preventive services for persons with diabetes include: an eye exam to screen for diabetic retinopathy, a kidney function test, a foot exam, and stricter control over your cholesterol.  
-Cardiovascular screening for adults with routine risk involves an electrocardiogram (ECG) at intervals determined by your doctor.  
-Colorectal cancer screenings should be done for adults age 54-65 with no increased risk factors for colorectal cancer. There are a number of acceptable methods of screening for this type of cancer. Each test has its own benefits and drawbacks. Discuss with your doctor what is most appropriate for you during your annual wellness visit. The different tests include: colonoscopy (considered the best screening method), a fecal occult blood test, a fecal DNA test, and sigmoidoscopy. -Breast cancer screenings are recommended every other year for women of normal risk, age 54-69. 
-Cervical cancer screenings for women over age 72 are only recommended with certain risk factors.  
-All adults born between [facility] should be screened once for Hepatitis C. Here is a list of your current Health Maintenance items (your personalized list of preventive services) with a due date: 
Health Maintenance Due Topic Date Due  
 DTaP/Tdap/Td  (1 - Tdap) 12/20/1956  Shingles Vaccine (1 of 2) 12/20/1985  Glaucoma Screening   12/20/2000  Pneumococcal Vaccine (2 of 2 - PPSV23) 01/01/2010 [address] [place] [address] Annual Well Visit  07/13/2018  Flu Vaccine  08/01/2018

## 2018-11-08 NOTE — PROGRESS NOTES
1. Have you been to the ER, urgent care clinic since your last visit? Hospitalized since your last visit? No 
 
2. Have you seen or consulted any other health care providers outside of the [address] [place] [address] since your last visit? Include any pap smears or colon screening.  No

## 2018-11-08 NOTE — PROGRESS NOTES
This is the Subsequent Medicare Annual Wellness Exam, performed 12 months or more after the Initial AWV or the last Subsequent AWV I have reviewed the patient's medical history in detail and updated the computerized patient record. History Past Medical History:  
Diagnosis Date  CAD (coronary artery disease) s/p cabg, dr Trinidad Rodriguez 2000 x4 vessel disease  Hypercholesterolemia  Hypertension  Hyperthyroidism   
 1990's, dr Mayers Body  Osteoporosis Past Surgical History:  
Procedure Laterality Date  BIOPSY OF BREAST, INCISIONAL  1985 LEFT breast  
 CABG, ARTERY-VEIN, FOUR  2000  
 heart surgery at [place] [address]    
 dr eLni bolden  HX COLONOSCOPY  2011  HX HYSTERECTOMY  2003  HX BRIAN AND BSO    
 2002  HX TRACHEOSTOMY    
 whooping cough age 2  
 [place] ANESTH,KNEE AREA SURGERY    
 tkr, right; 2008 Current Outpatient Medications Medication Sig Dispense Refill  pneumococcal 13 gilmar conj dip (PREVNAR 13, PF,) 0.5 mL syrg injection 0.5 mL by IntraMUSCular route once for 1 dose. 0.5 mL 0  
 varicella-zoster recombinant, PF, (SHINGRIX, PF,) 50 mcg/0.5 mL susr injection 0.5mL by IntraMUSCular route once now and then repeat in 2-6 months 0.5 mL 1  
 aspirin delayed-release 81 mg tablet Take 1 Tab by mouth daily. 100 Tab 5  
 atorvastatin (LIPITOR) 20 mg tablet TAKE 1 TABLET BY MOUTH EVERY DAY 90 Tab 0  
 estradiol (VAGIFEM) 10 mcg tab vaginal tablet USE ON TABLET VAGINALLY ON TUESDAYS AND THURSDAYS 24 Tab 0  
 lisinopril (PRINIVIL, ZESTRIL) 10 mg tablet Take 0.5 Tabs by mouth daily. 90 Tab 1  
 acetaminophen (TYLENOL EXTRA STRENGTH) 500 mg tablet Take  by mouth every six (6) hours as needed for Pain.  bumetanide (BUMEX) 1 mg tablet  methimazole (TAPAZOLE) 5 mg tablet Take 1.5 Tabs by mouth daily.  135 Tab 1  
 metoprolol (LOPRESSOR) 25 mg tablet Take one-half tablet by  mouth twice a day 180 Tab 1  
  isosorbide mononitrate ER (IMDUR) 30 mg tablet Take 1 tablet by mouth  every morning (Patient taking differently: Take 2 tablet by mouth  every morning) 90 Tab 1  
 NITROSTAT 0.4 mg SL tablet DISSOLVE 1 TABLET UNDER TONGUE EVERY 5 MINUTES AS NEEDED FOR CHEST PAIN 25 Tab 0 No Known Allergies Family History Problem Relation Age of Onset  Cancer Mother 48  
     breast   
 Stroke Mother  Cancer Father   
     lung  Lung Disease Father  Heart Disease Sister Social History Tobacco Use  Smoking status: Never Smoker  Smokeless tobacco: Never Used Substance Use Topics  Alcohol use: No  
 
Patient Active Problem List  
Diagnosis Code  Hyperlipidemia with target LDL less than 70 E78.5  Hyperthyroidism E05.90  
 HTN (hypertension) I10  
 Osteopenia M85.80  Coronary artery disease involving autologous artery coronary bypass graft without angina pectoris I25.810  Living will, counseling/discussion Z71.89  
 Calculus of gallbladder and bile duct without cholecystitis K80.70 Depression Risk Factor Screening: PHQ over the last two weeks 6/27/2018 PHQ Not Done - Little interest or pleasure in doing things Not at all Feeling down, depressed, irritable, or hopeless Not at all Total Score PHQ 2 0 Alcohol Risk Factor Screening:  
 
 
Functional Ability and Level of Safety:  
Hearing Loss The patient wears hearing aids. The patient needs further evaluation. Activities of Daily Living The home contains: no safety equipment. Patient does total self care Fall Risk Fall Risk Assessment, last 12 mths 6/27/2018 Able to walk? Yes Fall in past 12 months? No  
Fall with injury? -  
Number of falls in past 12 months - Fall Risk Score -  
 
 
Abuse Screen Patient is not abused Cognitive Screening Evaluation of Cognitive Function: 
Has your family/caregiver stated any concerns about your memory: no 
Normal 
 
Patient Care Team  
 Patient Care Team: Jose Romano MD (Inactive) as PCP - General (Internal Medicine) Rocky Marinelli MD (Cardiology) Jie Domingo RN as Ambulatory Care Navigator (Internal Medicine) Bryan Pool MD (Internal Medicine) Assessment/Plan Education and counseling provided: 
Are appropriate based on today's review and evaluation Diagnoses and all orders for this visit: 1. Encounter for immunization 
-     INFLUENZA VACCINE INACTIVATED (IIV), SUBUNIT, ADJUVANTED, IM 2. Medicare annual wellness visit, subsequent -     pneumococcal 13 gilmar conj dip (PREVNAR 13, PF,) 0.5 mL syrg injection; 0.5 mL by IntraMUSCular route once for 1 dose. 
-     varicella-zoster recombinant, PF, (SHINGRIX, PF,) 50 mcg/0.5 mL susr injection; 0.5mL by IntraMUSCular route once now and then repeat in 2-6 months 3. Screening for alcoholism -     [place] ANNUAL ALCOHOL SCREEN 15 MIN 4. Screening for depression 
-     [place] 68 5. Neck pain 
-     REFERRAL TO PHYSICAL THERAPY 6. Chronic pain of left knee 
-     REFERRAL TO PHYSICAL THERAPY Other orders 
-     aspirin delayed-release 81 mg tablet; Take 1 Tab by mouth daily. Health Maintenance Due Topic Date Due  
 DTaP/Tdap/Td series (1 - Tdap) 12/20/1956  Shingrix Vaccine Age 50> (1 of 2) 12/20/1985  GLAUCOMA SCREENING Q2Y  12/20/2000  Pneumococcal 65+ Low/Medium Risk (2 of 2 - PPSV23) 01/01/2010  MEDICARE YEARLY EXAM  07/13/2018  Influenza Age 5 to Adult  08/01/2018 She has on and off left nec to spine pain for several years Asked me to review labs done by the Endo office only note is TSH slight suppression and normal T4 and T 3 Cardiovascular ROS: no chest pain or dyspnea on exertion Neurological ROS: no TIA or stroke symptoms General ROS: negative for - chills, fatigue, fever, malaise, night sweats or sleep disturbance Endocrine ROS: negative for - hair pattern changes, hot flashes, malaise/lethargy, palpitations, polydipsia/polyuria, temperature intolerance or unexpected weight changes Musculoskeletal ROS: positive for - joint swelling and pain in knee - right and left Worried no weight loss I do all refills Home BP OK Vitals:  
 11/08/18 1038 BP: 108/64 Pulse: 64 Resp: 16 Temp: 96.1 °F (35.6 °C) TempSrc: Oral  
SpO2: 100% Weight: 170 lb (77.1 kg) Height: 5' 5\" (1.651 m) The neck is supple and free of adenopathy or masses, the thyroid is normal without enlargement or nodules. rom slight reduced c spine 
upper arm strength normal 
S1 and S2 normal, no murmurs, clicks, gallops or rubs. Regular rate and rhythm. Chest is clear; no wheezes or rales. No edema or JVD. Suspect c spine disease and will refer PT also knee pain from DJD 1. Encounter for immunization 
 
- INFLUENZA VACCINE INACTIVATED (IIV), SUBUNIT, ADJUVANTED, IM 2. Medicare annual wellness visit, subsequent 
suggest 
- pneumococcal 13 gilmar conj dip (PREVNAR 13, PF,) 0.5 mL syrg injection; 0.5 mL by IntraMUSCular route once for 1 dose. Dispense: 0.5 mL; Refill: 0 
- varicella-zoster recombinant, PF, (SHINGRIX, PF,) 50 mcg/0.5 mL susr injection; 0.5mL by IntraMUSCular route once now and then repeat in 2-6 months  Dispense: 0.5 mL; Refill: 1 3. Screening for alcoholism 
no - [place] ANNUAL ALCOHOL SCREEN 15 MIN 4. Screening for depression 
no - [facility] 68 5. Neck pain 
 
- REFERRAL TO PHYSICAL THERAPY 6. Chronic pain of left knee 
refer 
- REFERRAL TO PHYSICAL THERAPY Diagnoses and all orders for this visit: 1. Encounter for immunization 
-     INFLUENZA VACCINE INACTIVATED (IIV), SUBUNIT, ADJUVANTED, IM 2. Medicare annual wellness visit, subsequent -     pneumococcal 13 gilmar conj dip (PREVNAR 13, PF,) 0.5 mL syrg injection; 0.5 mL by IntraMUSCular route once for 1 dose. 
-     varicella-zoster recombinant, PF, (SHINGRIX, PF,) 50 mcg/0.5 mL susr injection; 0.5mL by IntraMUSCular route once now and then repeat in 2-6 months 3. Screening for alcoholism -     [place] ANNUAL ALCOHOL SCREEN 15 MIN 4. Screening for depression 
[facility]  [facility] 
-     REFERRAL TO PHYSICAL THERAPY 6. Chronic pain of left knee 
-     REFERRAL TO PHYSICAL THERAPY 7. Hyperthyroidism 8. Essential hypertension Other orders 
-     aspirin delayed-release 81 mg tablet; Take 1 Tab by mouth daily.  
 
 
Lower asa to 81 not 325 mg

## 2018-12-11 RX ORDER — ATORVASTATIN CALCIUM 20 MG/1
TABLET, FILM COATED ORAL
Qty: 90 TAB | Refills: 0 | Status: SHIPPED | OUTPATIENT
Start: 2018-12-11 | End: 2019-03-11 | Stop reason: SDUPTHER

## 2019-01-18 ENCOUNTER — OFFICE VISIT (OUTPATIENT)
Dept: INTERNAL MEDICINE CLINIC | Age: 84
End: 2019-01-18

## 2019-01-18 ENCOUNTER — HOSPITAL ENCOUNTER (OUTPATIENT)
Dept: LAB | Age: 84
Discharge: HOME | End: 2019-01-18
Payer: MEDICARE

## 2019-01-18 VITALS
RESPIRATION RATE: 16 BRPM | BODY MASS INDEX: 28.24 KG/M2 | WEIGHT: 169.5 LBS | DIASTOLIC BLOOD PRESSURE: 74 MMHG | TEMPERATURE: 98.7 F | HEART RATE: 84 BPM | HEIGHT: 65 IN | SYSTOLIC BLOOD PRESSURE: 122 MMHG

## 2019-01-18 DIAGNOSIS — H81.10 BENIGN PAROXYSMAL POSITIONAL VERTIGO, UNSPECIFIED LATERALITY: ICD-10-CM

## 2019-01-18 DIAGNOSIS — R53.83 FATIGUE, UNSPECIFIED TYPE: Primary | ICD-10-CM

## 2019-01-18 PROCEDURE — 84443 ASSAY THYROID STIM HORMONE: CPT

## 2019-01-18 PROCEDURE — 85025 COMPLETE CBC W/AUTO DIFF WBC: CPT

## 2019-01-18 PROCEDURE — 36415 COLL VENOUS BLD VENIPUNCTURE: CPT

## 2019-01-18 PROCEDURE — 80053 COMPREHEN METABOLIC PANEL: CPT

## 2019-01-18 RX ORDER — NITROGLYCERIN 0.4 MG/1
TABLET SUBLINGUAL
Qty: 25 TAB | Refills: 0 | Status: SHIPPED | OUTPATIENT
Start: 2019-01-18 | End: 2019-02-10 | Stop reason: SDUPTHER

## 2019-01-18 NOTE — PROGRESS NOTES
HISTORY OF PRESENT ILLNESS  Carlos Toledo is a 80 y.o. female. HPI  Patient presents to the office for evaluation of vertigo symptoms. She states she woke up on Wednesday and could tell something was a little off. She reports her head felt strange and movement a made her symptoms worse. She had a lot of spinning. She stated in bed most of the day Wednesday and Thursday because she was feeling dizzy and a little weak. Her friend came over to sit with her Thursday and gave her a motion sickness medication. This medication did help and today she was feeling much better. She reports she considered cancelling the appointment today but decided to come in to be seen. She shared she had a fainting episode and hit her head back in November. She had a work up by the cardiologist and just recently turned in Holter monitor. She said she was told by the nurse that she only saw a \"blip\" and it wasn't of a concern. Review of Systems   Neurological: Positive for dizziness and weakness. Negative for focal weakness and headaches. Blood pressure 122/74, pulse 84, temperature 98.7 °F (37.1 °C), temperature source Oral, resp. rate 16, height 5' 5\" (1.651 m), weight 169 lb 8 oz (76.9 kg). Physical Exam   Constitutional: No distress. Cardiovascular: Normal rate and regular rhythm. No murmur heard. Pulmonary/Chest: Effort normal and breath sounds normal. She has no wheezes. Neurological: No cranial nerve deficit. Coordination normal.   Mild spinning noted with going from supine to seated. ASSESSMENT and PLAN  Diagnoses and all orders for this visit:    1. Fatigue, unspecified type  -     CBC WITH AUTOMATED DIFF  -     METABOLIC PANEL, COMPREHENSIVE  -     TSH 3RD GENERATION    2.  Benign paroxysmal positional vertigo, unspecified laterality  -     CBC WITH AUTOMATED DIFF  -     METABOLIC PANEL, COMPREHENSIVE    Other orders  -     nitroglycerin (NITROSTAT) 0.4 mg SL tablet; DISSOLVE 1 TABLET UNDER TONGUE EVERY 5 MINUTES AS NEEDED FOR CHEST PAIN    routine labs ordered today. I suspect she was having vertigo. If symptoms worsen or return she is to follow up with one of the doctors. I will contact her with the results of her labs once back. All this was discussed with the patient and she understands and agrees.

## 2019-01-18 NOTE — PROGRESS NOTES
Carlos Toledo is a 80 y.o. female      Patient requesting refills on nitrostat     Chief Complaint   Patient presents with    Dizziness    Hypertension       1. Have you been to the ER, urgent care clinic since your last visit? Hospitalized since your last visit?11/2018 [facility] Heart problem, fall and hit head . Patient was wearing a heart monitor x 30 months  M  2. Have you seen or consulted any other health care providers outside of the [address] [place] [address] since your last visit? Include any pap smears or colon screening. No      Visit Vitals  /74 (BP 1 Location: Right arm, BP Patient Position: Standing)   Pulse 84   Temp 98.7 °F (37.1 °C) (Oral)   Resp 16   Ht 5' 5\" (1.651 m)   Wt 169 lb 8 oz (76.9 kg)   BMI 28.21 kg/m²           Health Maintenance Due   Topic Date Due    DTaP/Tdap/Td series (1 - Tdap) 12/20/1956    Shingrix Vaccine Age 50> (1 of 2) 12/20/1985    GLAUCOMA SCREENING Q2Y  12/20/2000    Pneumococcal 65+ Low/Medium Risk (2 of 2 - PPSV23) 01/01/2010         Medication Reconciliation completed, changes noted.   Please  Update medication list.

## 2019-01-19 LAB
ALBUMIN SERPL-MCNC: 4.3 G/DL (ref 3.5–4.7)
ALBUMIN/GLOB SERPL: 1.6 {RATIO} (ref 1.2–2.2)
ALP SERPL-CCNC: 87 IU/L (ref 39–117)
ALT SERPL-CCNC: 20 IU/L (ref 0–32)
AST SERPL-CCNC: 23 IU/L (ref 0–40)
BASOPHILS # BLD AUTO: 0 X10E3/UL (ref 0–0.2)
BASOPHILS NFR BLD AUTO: 0 %
BILIRUB SERPL-MCNC: 0.2 MG/DL (ref 0–1.2)
BUN SERPL-MCNC: 30 MG/DL (ref 8–27)
BUN/CREAT SERPL: 37 (ref 12–28)
CALCIUM SERPL-MCNC: 9.6 MG/DL (ref 8.7–10.3)
CHLORIDE SERPL-SCNC: 99 MMOL/L (ref 96–106)
CO2 SERPL-SCNC: 23 MMOL/L (ref 20–29)
CREAT SERPL-MCNC: 0.81 MG/DL (ref 0.57–1)
EOSINOPHIL # BLD AUTO: 0.1 X10E3/UL (ref 0–0.4)
EOSINOPHIL NFR BLD AUTO: 1 %
ERYTHROCYTE [DISTWIDTH] IN BLOOD BY AUTOMATED COUNT: 14.2 % (ref 12.3–15.4)
GLOBULIN SER CALC-MCNC: 2.7 G/DL (ref 1.5–4.5)
GLUCOSE SERPL-MCNC: 128 MG/DL (ref 65–99)
HCT VFR BLD AUTO: 40.3 % (ref 34–46.6)
HGB BLD-MCNC: 13 G/DL (ref 11.1–15.9)
IMM GRANULOCYTES # BLD AUTO: 0 X10E3/UL (ref 0–0.1)
IMM GRANULOCYTES NFR BLD AUTO: 0 %
LYMPHOCYTES # BLD AUTO: 2.8 X10E3/UL (ref 0.7–3.1)
LYMPHOCYTES NFR BLD AUTO: 34 %
MCH RBC QN AUTO: 27.8 PG (ref 26.6–33)
MCHC RBC AUTO-ENTMCNC: 32.3 G/DL (ref 31.5–35.7)
MCV RBC AUTO: 86 FL (ref 79–97)
MONOCYTES # BLD AUTO: 0.9 X10E3/UL (ref 0.1–0.9)
MONOCYTES NFR BLD AUTO: 11 %
NEUTROPHILS # BLD AUTO: 4.3 X10E3/UL (ref 1.4–7)
NEUTROPHILS NFR BLD AUTO: 54 %
PLATELET # BLD AUTO: 263 X10E3/UL (ref 150–379)
POTASSIUM SERPL-SCNC: 4.2 MMOL/L (ref 3.5–5.2)
PROT SERPL-MCNC: 7 G/DL (ref 6–8.5)
RBC # BLD AUTO: 4.68 X10E6/UL (ref 3.77–5.28)
SODIUM SERPL-SCNC: 141 MMOL/L (ref 134–144)
TSH SERPL DL<=0.005 MIU/L-ACNC: 0.16 UIU/ML (ref 0.45–4.5)
WBC # BLD AUTO: 8.1 X10E3/UL (ref 3.4–10.8)

## 2019-01-21 NOTE — PROGRESS NOTES
Please let the patient know her thyroid function is not at goal. Is she still seeing endocrinology for this? Also confirm she was not fasting. How much of the tapazole is she taking? All other labs look okay.  thanks

## 2019-01-21 NOTE — PROGRESS NOTES
Writer contacted patient to inform of lab results and information per Glenn Trotter, Patient verbalized understanding, patient does have an Endocrinologist and she will call to let them know her thyroid is not at goal, patient was not fasting when she had labs done, dosing for Tapazole 5mg- 2 tablets every morning.

## 2019-01-29 ENCOUNTER — TELEPHONE (OUTPATIENT)
Dept: INTERNAL MEDICINE CLINIC | Age: 84
End: 2019-01-29

## 2019-01-29 RX ORDER — ESTRADIOL 10 UG/1
INSERT VAGINAL
Qty: 24 TAB | Refills: 0 | Status: SHIPPED | OUTPATIENT
Start: 2019-01-29 | End: 2019-04-20 | Stop reason: SDUPTHER

## 2019-01-29 NOTE — TELEPHONE ENCOUNTER
Pharmacy is requesting a prescription for YUVAFEM 10 [address] [facility] , qty: 24, prescribed refill: 2

## 2019-02-21 ENCOUNTER — OFFICE VISIT (OUTPATIENT)
Dept: INTERNAL MEDICINE CLINIC | Age: 84
End: 2019-02-21

## 2019-02-21 VITALS
RESPIRATION RATE: 18 BRPM | WEIGHT: 169 LBS | DIASTOLIC BLOOD PRESSURE: 66 MMHG | HEIGHT: 65 IN | BODY MASS INDEX: 28.16 KG/M2 | TEMPERATURE: 96.7 F | SYSTOLIC BLOOD PRESSURE: 147 MMHG | HEART RATE: 62 BPM

## 2019-02-21 DIAGNOSIS — E05.90 HYPERTHYROIDISM: ICD-10-CM

## 2019-02-21 DIAGNOSIS — I25.84 CORONARY ARTERY DISEASE DUE TO CALCIFIED CORONARY LESION: ICD-10-CM

## 2019-02-21 DIAGNOSIS — I10 ESSENTIAL HYPERTENSION: ICD-10-CM

## 2019-02-21 DIAGNOSIS — Z01.818 PREOP EXAM FOR INTERNAL MEDICINE: Primary | ICD-10-CM

## 2019-02-21 DIAGNOSIS — I25.10 CORONARY ARTERY DISEASE DUE TO CALCIFIED CORONARY LESION: ICD-10-CM

## 2019-02-21 NOTE — PROGRESS NOTES
1. Have you been to the ER, urgent care clinic since your last visit? Hospitalized since your last visit? No 
 
2. Have you seen or consulted any other health care providers outside of the [address] [place] [address] since your last visit? Include any pap smears or colon screening. Yes. Dr Cj Gooden, Dr Michelle Hadley, Dr Lopez-cardiology

## 2019-02-21 NOTE — PROGRESS NOTES
Dax Royal was referred for evaluation by:Dr. Vadim Rosario for Pre- Op Evaluation. Please see encounter details and orders for consultative summary. Type of surgery : knee replacemnt Surgery site : [facility] Anesthesia type: general 
Date of procedure:  3/5/19 Allergies: No Known Allergies Latex allergy: no 
Prior reactions to anesthesia:  None Past Surgical History:  
Procedure Laterality Date  BIOPSY OF BREAST, INCISIONAL  1985 LEFT breast  
 CABG, ARTERY-VEIN, FOUR  2000  
 heart surgery at [place] [address]    
 dr Ann Weiss bolden  HX COLONOSCOPY  2011  HX HYSTERECTOMY  2003  HX BRIAN AND BSO    
 2002  HX TRACHEOSTOMY    
 whooping cough age 2  
 [place] ANESTH,KNEE AREA SURGERY    
 tkr, right; 2008 Functional status:  she is able to ambulate up 1 flights of step withmild shortness of breath, chest pain Prior cardiac evaluation:   Last seen in office 2 weeks ago had chemical stress test within a year Current Outpatient Medications Medication Sig  
 nitroglycerin (NITROSTAT) 0.4 mg SL tablet DISSOLVE 1 TABLET UNDER TONGUE EVERY 5 MINUTES AS NEEDED FOR CHEST PAIN  
 estradiol (VAGIFEM) 10 mcg tab vaginal tablet USE ON TABLET VAGINALLY ON TUESDAYS AND Fridays  atorvastatin (LIPITOR) 20 mg tablet TAKE 1 TABLET BY MOUTH EVERY DAY  aspirin delayed-release 81 mg tablet Take 1 Tab by mouth daily.  lisinopril (PRINIVIL, ZESTRIL) 10 mg tablet Take 0.5 Tabs by mouth daily.  acetaminophen (TYLENOL EXTRA STRENGTH) 500 mg tablet Take  by mouth every six (6) hours as needed for Pain.  methimazole (TAPAZOLE) 5 mg tablet Take 1.5 Tabs by mouth daily. (Patient taking differently: Take 10 mg by mouth daily.)  isosorbide mononitrate ER (IMDUR) 30 mg tablet Take 1 tablet by mouth  every morning (Patient taking differently: Take 2 tablet by mouth  every morning)  varicella-zoster recombinant, PF, (SHINGRIX, PF,) 50 mcg/0.5 mL susr injection 0.5mL by IntraMUSCular route once now and then repeat in 2-6 months No current facility-administered medications for this visit. Past Medical History:  
Diagnosis Date  CAD (coronary artery disease) s/p cabg, dr Tina Washburn 2000 x4 vessel disease  Hypercholesterolemia  Hypertension  Hyperthyroidism   
 1990's, dr Tal Rivero  Osteoporosis Past Surgical History:  
Procedure Laterality Date  BIOPSY OF BREAST, INCISIONAL  1985 LEFT breast  
 CABG, ARTERY-VEIN, FOUR  2000  
 heart surgery at [place] [address]    
 dr Danielle bolden  HX COLONOSCOPY  2011  HX HYSTERECTOMY  2003  HX BRIAN AND BSO    
 2002  HX TRACHEOSTOMY    
 whooping cough age 2  
 [place] ANESTH,KNEE AREA SURGERY    
 tkr, right; 2008 Social History Tobacco Use  Smoking status: Never Smoker  Smokeless tobacco: Never Used Substance Use Topics  Alcohol use: No  
 Drug use: No  
 
 
Blood pressure 147/66, pulse 62, temperature 96.7 °F (35.9 °C), temperature source Oral, resp. rate 18, height 5' 5\" (1.651 m), weight 169 lb (76.7 kg). Wt Readings from Last 3 Encounters:  
02/21/19 169 lb (76.7 kg) 01/18/19 169 lb 8 oz (76.9 kg) 11/08/18 170 lb (77.1 kg) BP Readings from Last 3 Encounters:  
02/21/19 147/66  
01/18/19 122/74  
11/08/18 108/64 S1 and S2 normal, no murmurs, clicks, gallops or rubs. Regular rate and rhythm. Chest is clear; no wheezes or rales. No edema or JVD. Skin The abdomen is soft without tenderness, guarding, mass, rebound or organomegaly. Bowel sounds are normal. No CVA tenderness or inguinal adenopathy noted. Neuro non focal 
 
 
Cleared for the planned surgical procedure and anesthesia based on todays findings and exam 
Reviewed DVT prophylaxis Usual meds prior to surgery Diagnoses and all orders for this visit: 1. Preop exam for internal medicine 2. Essential hypertension 3. Hyperthyroidism 4. Coronary artery disease due to calcified coronary lesion

## 2019-03-07 RX ORDER — NITROGLYCERIN 0.4 MG/1
TABLET SUBLINGUAL
Qty: 25 TAB | Refills: 0 | Status: SHIPPED | OUTPATIENT
Start: 2019-03-07 | End: 2020-02-06 | Stop reason: SDUPTHER

## 2019-03-14 ENCOUNTER — PATIENT OUTREACH (OUTPATIENT)
Dept: INTERNAL MEDICINE CLINIC | Age: 84
End: 2019-03-14

## 2019-03-14 RX ORDER — METHIMAZOLE 10 MG/1
5 TABLET ORAL DAILY
COMMUNITY

## 2019-03-14 RX ORDER — POLYETHYLENE GLYCOL 3350 17 G/17G
17 POWDER, FOR SOLUTION ORAL DAILY
COMMUNITY
End: 2019-04-05

## 2019-03-14 RX ORDER — DOCUSATE SODIUM 100 MG/1
50 CAPSULE, LIQUID FILLED ORAL 2 TIMES DAILY
COMMUNITY
End: 2021-10-14

## 2019-03-14 RX ORDER — HYDROMORPHONE HYDROCHLORIDE 2 MG/1
2 TABLET ORAL 3 TIMES DAILY
COMMUNITY
End: 2019-04-05 | Stop reason: ALTCHOICE

## 2019-03-14 RX ORDER — HYDROMORPHONE HYDROCHLORIDE 2 MG/1
2 TABLET ORAL
COMMUNITY
End: 2019-04-05 | Stop reason: ALTCHOICE

## 2019-03-14 RX ORDER — ASPIRIN 81 MG/1
81 TABLET ORAL DAILY
COMMUNITY

## 2019-03-14 RX ORDER — METOPROLOL TARTRATE 25 MG/1
12.5 TABLET, FILM COATED ORAL EVERY EVENING
COMMUNITY
End: 2019-05-14 | Stop reason: ALTCHOICE

## 2019-03-14 NOTE — PROGRESS NOTES
Transition of Care Coordination/Hospital to Post Acute Facility:     Date/Time:  3/14/2019 3:46 PM    Patient was admitted to [facility] on 3/6 and discharged home on 3/8 for left total knee arthroplasty with Dr. Grewal.    Patient was readmitted <30 days to [facility] on 3/10 for treatment of right lung pulmonary embolism & possible MI. [facility] was [facility]. Top Challenges reviewed    Stress test + small to moderate area of ischemia & small non-Q wave MI, ECHO w/ EF 50-55%, +R lung PE, started on Xarelto & metoprolol, hx bradycardia on BBs         Method of communication with care team :chart routing     Nurse Navigator(NN) spoke with nurse Jeffrey Kate and  Zina Rayo to provide introduction to self and explanation of the Nurse Navigator Role. Verified name and  as patient identifiers. Discussed and reviewed  follow up appointments, medication reconciliation-Tanya will fax medication list to this NN.  3/15/19: Faxed request for medication list to [facility] & received sent fax confirmation. ACP:   Does the patient have a current ACP (including DDNR):  not on file  Does the post acute facility have a copy of the patients ACP:  N/A    Medication(s):   New Medications at Discharge: Xarelto, metoprolol, Colace, Miralax, Dilaudid  Changed Medications at Discharge: ASA increased from daily to BID  Discontinued Medications at Discharge: none     PCP/Specialist follow up: Rose Jimenez NP on 3/27 @ 9:15 AM, Orth[address] on 3/21 @ 1:50 PM    Opportunity to ask questions was provided. Contact information was provided for future reference or further questions. Will continue to monitor.     Goals      Pt will attend [facility] appts (pt-stated)      3/14/19: Appt scheduled with Cardiology Clay Aviles NP on 3/27 @ 9:15 AM and with Orthopedic Surgeon Dr. Grewal on 3/21. -SRW

## 2019-04-05 ENCOUNTER — PATIENT OUTREACH (OUTPATIENT)
Dept: INTERNAL MEDICINE CLINIC | Age: 84
End: 2019-04-05

## 2019-04-05 RX ORDER — ACETAMINOPHEN 325 MG/1
650 TABLET, FILM COATED ORAL
COMMUNITY
End: 2021-10-14

## 2019-04-05 RX ORDER — OXYCODONE AND ACETAMINOPHEN 5; 325 MG/1; MG/1
1 TABLET ORAL
COMMUNITY
End: 2019-05-14 | Stop reason: ALTCHOICE

## 2019-04-05 RX ORDER — ISOSORBIDE DINITRATE 30 MG/1
30 TABLET ORAL DAILY
COMMUNITY
End: 2019-05-22 | Stop reason: ALTCHOICE

## 2019-04-05 NOTE — PROGRESS NOTES
Hospital Discharge Follow-Up Date/Time:  2019 10:44 AM 
 
Patient was admitted to [facility] on 3/6 and discharged home on 3/8 for left total knee arthroplasty with Dr. Sharyle Hasten. 
  
Patient was readmitted <30 days to [facility] on 3/10 for treatment of right pulmonary embolism & possible MI. Patient was discharged 3/13 to [facility] for continuation of care and discharged home on 3/29. The rehab physician discharge summary was not available at the time of outreach. Patient was contacted within 5 business days of discharge. Top Challenges reviewed with the provider Recent episodes of hypotension/lightheadedness/CP: since home pt has had 2 episodes, SBP 99 then up to 105, saw cardiology  & meds were adjusted, had to take 2 Nitroglycerin since home Method of communication with provider :face to face, plan to discuss at appt Inpatient RRAT score: n/a Was this a readmission? yes Patient stated reason for the readmission: blood clot in lung Nurse Navigator (NN) contacted the patient and her daughter Andree Key by telephone to perform post hospital discharge assessment. Verified name and  with patient as identifiers. Provided introduction to self, and explanation of the Nurse Navigator role. Reviewed discharge instructions and red flags with patient who verbalized understanding. Patient given an opportunity to ask questions and does not have any further questions or concerns at this time. The patient agrees to contact the PCP office for questions related to their healthcare. NN provided contact information for future reference. Disease Specific:   N/A Summary of patient's top problems: 1. Possible MI: Stress test + small to moderate area of ischemia & small non-Q wave MI, ECHO w/ EF 50-55%, saw cardiology yesterday-Imdur increased & metoprolol decreased, will f/u with Dr. Daya Wood next month to discuss possible cardiac cath 2. PE: +R lung PE, started on Xarelto & metoprolol, hx bradycardia on BBs 3. S/P Recent L TKA: ambulating w/ walker, weaning off of Percocet & has not taken any today, taking Tylenol prn, working with therapy to improve activity tolerance, still has a lot of swelling that hinders ROM-applying ice & elevating, will get compression hose Home Health orders at discharge: PT [address]: At [facility] Date of initial visit: unknown-visited after discharge home & PT is seeing pt 2x/wk Durable Medical Equipment ordered/company: walker Durable Medical Equipment received: yes Barriers to care? none identified Advance Care Planning:  
Does patient have an Advance Directive:  Pt is not sure, will check w/ son to see if he has a copy Medication(s):  
New Medications at Discharge: Xarelto, metoprolol, Percocet Changed Medications at Discharge: isosorbide increased from 30 to 60 mg daily Discontinued Medications at Discharge: lisinopril Medication reconciliation was performed with patient's daughter, who verbalizes understanding of administration of home medications. She manages medications by filling a pill box for the patient. There were no barriers to obtaining medications identified at this time. Referral to Pharm D needed: no  
 
Current Outpatient Medications Medication Sig  
 isosorbide dinitrate (ISORDIL) 30 mg tablet Take 60 mg by mouth daily.  oxyCODONE-acetaminophen (PERCOCET) 5-325 mg per tablet Take 1 Tab by mouth every four (4) hours as needed for Pain.  acetaminophen (TYLENOL) 325 mg tablet Take 650 mg by mouth every four (4) hours as needed for Pain.  methIMAzole (TAPAZOLE) 10 mg tablet Take 10 mg by mouth daily.  aspirin delayed-release 81 mg tablet Take 81 mg by mouth daily.  rivaroxaban (XARELTO) 15 mg tab tablet Take 15 mg by mouth two (2) times daily (with meals).  metoprolol tartrate (LOPRESSOR) 25 mg tablet Take 12.5 mg by mouth every evening.  docusate sodium (COLACE) 100 mg capsule Take 100 mg by mouth two (2) times a day.  atorvastatin (LIPITOR) 20 mg tablet TAKE 1 TABLET BY MOUTH EVERY DAY  nitroglycerin (NITROSTAT) 0.4 mg SL tablet DISSOLVE 1 TABLET UNDER TONGUE EVERY 5 MINUTES AS NEEDED FOR CHEST PAIN  
 estradiol (VAGIFEM) 10 mcg tab vaginal tablet USE ON TABLET VAGINALLY ON TUESDAYS AND Fridays  varicella-zoster recombinant, PF, (SHINGRIX, PF,) 50 mcg/0.5 mL susr injection 0.5mL by IntraMUSCular route once now and then repeat in 2-6 months No current facility-administered medications for this visit. Medications Discontinued During This Encounter Medication Reason  HYDROmorphone (DILAUDID) 2 mg tablet Therapy Completed  HYDROmorphone (DILAUDID) 2 mg tablet Therapy Completed  polyethylene glycol (MIRALAX) 17 gram/dose powder Not A Current Medication  isosorbide mononitrate ER (IMDUR) 30 mg tablet Dose Adjustment  oxycodone HCl/acetaminophen (PERCOCET PO) Dose Adjustment  acetaminophen (TYLENOL EXTRA STRENGTH) 500 mg tablet Dose Adjustment  lisinopril (PRINIVIL, ZESTRIL) 10 mg tablet Discontinued by Another Clinician [facility] follow up appointment(s):  
Future Appointments Date Time Provider Eduar Beryl 4/25/2019  1:45 PM Elvira Schuster MD [place] [facility] follow up appointment(s): [facility] Ganesh Bruce NP saw pt 4/4 & pt will see Dr. Rukhsana Jiménez around 5/23, Orthopedic Surgeon Dr. Selina Armstrong saw pt while in rehab & f[facility] is [facility]  information provided as a resource Goals  Pt will attend [facility] appts (pt-stated) 4/5/19: Pt attended appt with Ganesh Bruce NP on 4/4 & follow up is scheduled w/ Dr. Rukshana Jiménez around 5/23 to discuss possible cardiac cath. Pt saw Dr. Selina Armstrong while in rehab & has f/u around 4/18.  Appt scheduled with Dr. Brice Price on 4/25. -SRW 
 
3/14/19: Appt scheduled with Cardiology Ganesh Bruce NP on 3/27 @ 9:15 AM and with Orthopedic Surgeon Dr. Selina Armstrong on 3/21. -SRW  Pt will work with PT to improve stamina/strength (pt-stated) 4/5/19: Pt reports she is very slow & has decreased stamina & fatigue w/ activity. She just finished showering. She is using a walker & hopes to return to previous level of functioning w/ no AD. Lives alone & has 5 supportive children who have been periodically staying w/ her. She is working with [employer] PT 2x/wk. -SRW

## 2019-04-08 ENCOUNTER — PATIENT OUTREACH (OUTPATIENT)
Dept: INTERNAL MEDICINE CLINIC | Age: 84
End: 2019-04-08

## 2019-04-08 RX ORDER — ONDANSETRON 4 MG/1
4 TABLET, ORALLY DISINTEGRATING ORAL
Qty: 12 TAB | Refills: 0 | Status: SHIPPED | OUTPATIENT
Start: 2019-04-08 | End: 2019-04-25 | Stop reason: SDUPTHER

## 2019-04-08 RX ORDER — ONDANSETRON 4 MG/1
4 TABLET, ORALLY DISINTEGRATING ORAL
OUTPATIENT
Start: 2019-04-08

## 2019-04-08 NOTE — PROGRESS NOTES
Received call from pt stating she has been experiencing constant nausea for the past few days and diarrhea 3 days ago & again today which is bright yellow & watery. She had chills a few days ago w/ the diarrhea, but denies chills/fever today. She has been drinking fluids, mainly water, & ate saltines yesterday. Pt has been taking Colace d/t ongoing issues with constipation. Advised her to hold Colace until diarrhea has resolved & GI system/bowels have returned to normal. She also  feels lightheaded. /81, HR 60. Advised pt to contact cardiologist and notify him of continued lightheadedness in spite of recent med adjustments. The pt verbalized understanding. Pt is requesting a Rx for something for nausea. She is also requesting a Rx for compression hose. 4:55 PM: Advised pt that compression hose can only be bought OTC, not as a Rx. She has actually gone to [facility] today & they fitted her for compression hose & she will receive the delivery this afternoon. 8:44 AM 04/09/19 Dr. Doyle Mai sent in Rx for zofran to patient pharmacy. Notified pt and she verbalized understanding.

## 2019-04-22 RX ORDER — ESTRADIOL 10 UG/1
INSERT VAGINAL
Qty: 24 TAB | Refills: 0 | Status: SHIPPED | OUTPATIENT
Start: 2019-04-22 | End: 2019-07-12 | Stop reason: SDUPTHER

## 2019-04-25 ENCOUNTER — OFFICE VISIT (OUTPATIENT)
Dept: INTERNAL MEDICINE CLINIC | Age: 84
End: 2019-04-25

## 2019-04-25 VITALS
HEIGHT: 65 IN | RESPIRATION RATE: 18 BRPM | DIASTOLIC BLOOD PRESSURE: 59 MMHG | SYSTOLIC BLOOD PRESSURE: 131 MMHG | HEART RATE: 83 BPM | BODY MASS INDEX: 26.66 KG/M2 | OXYGEN SATURATION: 97 % | WEIGHT: 160 LBS | TEMPERATURE: 97.9 F

## 2019-04-25 DIAGNOSIS — Z86.711 HISTORY OF PULMONARY EMBOLISM: Primary | ICD-10-CM

## 2019-04-25 DIAGNOSIS — I10 ESSENTIAL HYPERTENSION: ICD-10-CM

## 2019-04-25 DIAGNOSIS — I25.810 CORONARY ARTERY DISEASE INVOLVING AUTOLOGOUS ARTERY CORONARY BYPASS GRAFT WITHOUT ANGINA PECTORIS: ICD-10-CM

## 2019-04-25 RX ORDER — ONDANSETRON 4 MG/1
4 TABLET, ORALLY DISINTEGRATING ORAL
Qty: 20 TAB | Refills: 2 | Status: SHIPPED | OUTPATIENT
Start: 2019-04-25 | End: 2019-05-14 | Stop reason: ALTCHOICE

## 2019-04-25 NOTE — PROGRESS NOTES
Hospital Discharge Follow-Up Date/Time:  4/25/2019 10:44 AM 
 
Patient was admitted to [facility] on 3/6 and discharged home on 3/8 for left total knee arthroplasty with Dr. Dana Fong. 
  
Patient was readmitted <30 days to [facility] on 3/10 for treatment of right pulmonary embolism & possible MI. Patient was discharged 3/13 to [facility] for continuation of care and discharged home on 3/29. She received aspirin as DVT PE prophylaxix. which was not effective Also had troponin bump and some increased chest pain On isordil now Patient Active Problem List  
 Diagnosis  History of pulmonary embolism  Calculus of gallbladder and bile duct without cholecystitis  Living will, counseling/discussion  Coronary artery disease involving autologous artery coronary bypass graft without angina pectoris Stent 7/ 2015  Osteopenia 2010 hip -0.7.  Hyperthyroidism Followed by Dr Deepak Haji.  HTN (hypertension)  Hyperlipidemia with target LDL less than 70  Coronary artery disease due to calcified coronary lesion  
  2000. CABG. Followed by Dr Tariq Doll. 2013 Nuclear stress test . 2015 PTCA UZAIR Ramus saphenous graft. [email or web address] No home  Issues now still on oxycodone and still some nausea No jensen 
occ angina  But minor amount Not driving Still in PT Vitals:  
 04/25/19 1356 BP: 131/59 Pulse: 83 Resp: 18 Temp: 97.9 °F (36.6 °C) TempSrc: Oral  
SpO2: 97% Weight: 160 lb (72.6 kg) Height: 5' 5\" (1.651 m) S1 and S2 normal, no murmurs, clicks, gallops or rubs. Regular rate and rhythm. Chest is clear; no wheezes or rales. No edema or JVD. Left knee swollen no calf tenderness 1. History of pulmonary embolism Advise 3 month therapy with NOAC 2. Essential hypertension 
controlled 3. Coronary artery disease involving autologous artery coronary bypass graft without angina pectoris Minimal now following with cardiology Slow progress from   Knee replacement Prolonged visit with 15 minutes of time out  of more than a  25 minute visit spent counseling patient and family and formulation of care

## 2019-04-25 NOTE — PROGRESS NOTES
1. Have you been to the ER, urgent care clinic since your last visit? Hospitalized since your last visit? yes  ChippX2 Left TKR and post op PE/MI with 2 stays at [facility] for rehab 2. Have you seen or consulted any other health care providers outside of the [address] [place] [address] since your last visit? Include any pap smears or colon screening.  Dr Kameron Joseph, cardiology-[facility] ( Ag Mccormick NP),

## 2019-04-29 ENCOUNTER — PATIENT OUTREACH (OUTPATIENT)
Dept: INTERNAL MEDICINE CLINIC | Age: 84
End: 2019-04-29

## 2019-05-14 ENCOUNTER — OFFICE VISIT (OUTPATIENT)
Dept: INTERNAL MEDICINE CLINIC | Age: 84
End: 2019-05-14

## 2019-05-14 VITALS
DIASTOLIC BLOOD PRESSURE: 76 MMHG | TEMPERATURE: 98.1 F | OXYGEN SATURATION: 96 % | RESPIRATION RATE: 20 BRPM | WEIGHT: 157 LBS | HEIGHT: 65 IN | BODY MASS INDEX: 26.16 KG/M2 | SYSTOLIC BLOOD PRESSURE: 142 MMHG | HEART RATE: 68 BPM

## 2019-05-14 DIAGNOSIS — I10 ESSENTIAL HYPERTENSION: Primary | ICD-10-CM

## 2019-05-14 DIAGNOSIS — Z96.652 STATUS POST LEFT KNEE REPLACEMENT: ICD-10-CM

## 2019-05-14 DIAGNOSIS — I25.810 CORONARY ARTERY DISEASE INVOLVING AUTOLOGOUS ARTERY CORONARY BYPASS GRAFT WITHOUT ANGINA PECTORIS: ICD-10-CM

## 2019-05-14 RX ORDER — CYCLOBENZAPRINE HCL 10 MG
10 TABLET ORAL
COMMUNITY
Start: 2019-04-18 | End: 2020-09-17 | Stop reason: DRUGHIGH

## 2019-05-14 RX ORDER — ACETAMINOPHEN 500 MG
2 TABLET ORAL
COMMUNITY

## 2019-05-14 RX ORDER — ISOSORBIDE MONONITRATE 30 MG/1
60 TABLET, EXTENDED RELEASE ORAL
Refills: 2 | COMMUNITY
Start: 2019-04-08 | End: 2021-10-14

## 2019-05-14 NOTE — PROGRESS NOTES
Patient c/o headache for x1 days. PT with knee, had headache yesterday, BP low 104/59, in the evening, BP 8:15 am 147/67, Hr-61, 10 min later 138/63 Hr-58.

## 2019-05-14 NOTE — PROGRESS NOTES
HISTORY OF PRESENT ILLNESS Amy Kinney is a 80 y.o. female. HPI Patient presents to the office for evaluation of her blood pressure. She reports she recently had a total knee replacement. Yesterday she was suppose to have physical therapy. She told the therapist that she had a headache. The therapist took her blood pressure and her pressure was 104/59. She was then told that they would not be doing physical therapy because of her blood pressure. She was asked about her hydration and she was told to go rest. Later than evening she took her blood pressure again and it was elevated at 170/62. She took it again and it was still elevated. She reached out to the on call doctor and she was advised to take 2 isosorbide. She reports she went to bed. She did have some knee pain last night but she did not feel like getting up and taking her medication. (she is taking extra strength tylenol for there pain, she stopped the percocet because it made her feel off) This morning she took her blood pressure and it was 147/67 and 10 minutes later it was 138/63. She reports she is feeling much better today. Review of Systems Respiratory: Negative for cough and shortness of breath. Cardiovascular: Positive for leg swelling. Negative for chest pain. Genitourinary: Positive for frequency. Urinary frequency is not new. Musculoskeletal: Positive for joint pain. Left knee pain. Neurological: Positive for headaches. Negative for dizziness, sensory change, speech change and focal weakness. Blood pressure 142/76, pulse 68, temperature 98.1 °F (36.7 °C), temperature source Oral, resp. rate 20, height 5' 5\" (1.651 m), weight 157 lb (71.2 kg), SpO2 96 %. Physical Exam  
Constitutional: She appears well-developed and well-nourished. No distress. Ambulating with rolling walker. Cardiovascular: Normal rate and regular rhythm. No murmur heard.  
Pulmonary/Chest: Effort normal and breath sounds normal.  
 Musculoskeletal: She exhibits edema. Mild peripheral edema. Psychiatric: She has a normal mood and affect. Her behavior is normal. Judgment and thought content normal.  
 
 
ASSESSMENT and PLAN Diagnoses and all orders for this visit: 1. Essential hypertension 2. Status post left knee replacement 3. Coronary artery disease involving autologous artery coronary bypass graft without angina pectoris I explained to the patient that today her blood pressure is okay. I tried to go over her medications with her to confirm the correct dosages but she does not know the correct dosage. I would like for her to call back to the office with the correct dosage. I am not sure if her blood pressure was elevated yesterday afternoon because she is not taking her medication correctly. We talked about hydrating better. Currently she is not drinking nearly enough fluids during the day. She had ask me about calibrating her blood pressure cuff today as well. I explained to her unfortunately I do not know how to calibrate her machine and if she feels it is not working then she should get a new one. All this was discussed with the patient and she understands and agrees.

## 2019-05-22 ENCOUNTER — OFFICE VISIT (OUTPATIENT)
Dept: INTERNAL MEDICINE CLINIC | Age: 84
End: 2019-05-22

## 2019-05-22 VITALS
BODY MASS INDEX: 26.16 KG/M2 | DIASTOLIC BLOOD PRESSURE: 72 MMHG | HEART RATE: 63 BPM | WEIGHT: 157 LBS | TEMPERATURE: 96.8 F | SYSTOLIC BLOOD PRESSURE: 149 MMHG | RESPIRATION RATE: 18 BRPM | OXYGEN SATURATION: 97 % | HEIGHT: 65 IN

## 2019-05-22 DIAGNOSIS — I10 ESSENTIAL HYPERTENSION: Primary | ICD-10-CM

## 2019-05-22 DIAGNOSIS — E05.90 HYPERTHYROIDISM: ICD-10-CM

## 2019-05-22 DIAGNOSIS — E78.5 HYPERLIPIDEMIA WITH TARGET LDL LESS THAN 70: ICD-10-CM

## 2019-05-22 PROBLEM — I25.118 CORONARY ARTERY DISEASE WITH STABLE ANGINA PECTORIS (HCC): Status: ACTIVE | Noted: 2019-05-22

## 2019-05-22 RX ORDER — LISINOPRIL 10 MG/1
10 TABLET ORAL DAILY
Qty: 90 TAB | Refills: 1 | Status: SHIPPED | OUTPATIENT
Start: 2019-05-22

## 2019-05-22 NOTE — PROGRESS NOTES
Chief Complaint   Patient presents with    Hypertension     fluctuating BP     Spoke DR ramirez for HTN took 2 imdur    She was then told that they would not be doing physical therapy because of her blood pressure. She was asked about her hydration and she was told to go rest. Later than evening she took her blood pressure again and it was elevated at 170/62. She took it again and it was still elevated. She reached out to the on call doctor and she was advised to take 2 isosorbide. She reports she went to bed. She did have some knee pain last night but she did not feel like getting up and taking her medication. (she is taking extra strength tylenol for there pain, she stopped the percocet because it made her feel off) This morning she took her blood pressure and it was 147/67 and 10 minutes later it was 138/63      Pain less    Home BP still 160 to 170 at times    Vitals:    05/22/19 1627 05/22/19 1655   BP: 156/66 149/72   Pulse: 63    Resp: 18    Temp: 96.8 °F (36 °C)    TempSrc: Oral    SpO2: 97%    Weight: 157 lb (71.2 kg)    Height: 5' 5\" (1.651 m)      Rate 60s  chest clear      No edema      1. Hyperlipidemia with target LDL less than 70  Labs ordered    2. Essential hypertension  Add ace inhibitor now only one imdur daily  - lisinopril (PRINIVIL, ZESTRIL) 10 mg tablet; Take 1 Tab by mouth daily. Dispense: 90 Tab; Refill: 1  - CBC WITH AUTOMATED DIFF  - LIPID PANEL  - METABOLIC PANEL, COMPREHENSIVE    3.  Hyperthyroidism  labs  - TSH 3RD GENERATION        Continue tylenol for pain

## 2019-06-03 ENCOUNTER — HOSPITAL ENCOUNTER (OUTPATIENT)
Dept: LAB | Age: 84
Discharge: HOME | End: 2019-06-03
Payer: MEDICARE

## 2019-06-03 PROCEDURE — 80061 LIPID PANEL: CPT

## 2019-06-03 PROCEDURE — 80053 COMPREHEN METABOLIC PANEL: CPT

## 2019-06-03 PROCEDURE — 84443 ASSAY THYROID STIM HORMONE: CPT

## 2019-06-03 PROCEDURE — 36415 COLL VENOUS BLD VENIPUNCTURE: CPT

## 2019-06-03 PROCEDURE — 85025 COMPLETE CBC W/AUTO DIFF WBC: CPT

## 2019-06-04 LAB
ALBUMIN SERPL-MCNC: 4.3 G/DL (ref 3.5–4.7)
ALBUMIN/GLOB SERPL: 1.9 {RATIO} (ref 1.2–2.2)
ALP SERPL-CCNC: 77 IU/L (ref 39–117)
ALT SERPL-CCNC: 10 IU/L (ref 0–32)
AST SERPL-CCNC: 15 IU/L (ref 0–40)
BASOPHILS # BLD AUTO: 0 X10E3/UL (ref 0–0.2)
BASOPHILS NFR BLD AUTO: 1 %
BILIRUB SERPL-MCNC: 0.2 MG/DL (ref 0–1.2)
BUN SERPL-MCNC: 13 MG/DL (ref 8–27)
BUN/CREAT SERPL: 25 (ref 12–28)
CALCIUM SERPL-MCNC: 9.6 MG/DL (ref 8.7–10.3)
CHLORIDE SERPL-SCNC: 105 MMOL/L (ref 96–106)
CHOLEST SERPL-MCNC: 182 MG/DL (ref 100–199)
CO2 SERPL-SCNC: 20 MMOL/L (ref 20–29)
CREAT SERPL-MCNC: 0.53 MG/DL (ref 0.57–1)
EOSINOPHIL # BLD AUTO: 0.2 X10E3/UL (ref 0–0.4)
EOSINOPHIL NFR BLD AUTO: 3 %
ERYTHROCYTE [DISTWIDTH] IN BLOOD BY AUTOMATED COUNT: 15.9 % (ref 12.3–15.4)
GLOBULIN SER CALC-MCNC: 2.3 G/DL (ref 1.5–4.5)
GLUCOSE SERPL-MCNC: 89 MG/DL (ref 65–99)
HCT VFR BLD AUTO: 36.4 % (ref 34–46.6)
HDLC SERPL-MCNC: 48 MG/DL
HGB BLD-MCNC: 11 G/DL (ref 11.1–15.9)
IMM GRANULOCYTES # BLD AUTO: 0 X10E3/UL (ref 0–0.1)
IMM GRANULOCYTES NFR BLD AUTO: 0 %
INTERPRETATION, 910389: NORMAL
LDLC SERPL CALC-MCNC: 115 MG/DL (ref 0–99)
LYMPHOCYTES # BLD AUTO: 2.3 X10E3/UL (ref 0.7–3.1)
LYMPHOCYTES NFR BLD AUTO: 43 %
MCH RBC QN AUTO: 26 PG (ref 26.6–33)
MCHC RBC AUTO-ENTMCNC: 30.2 G/DL (ref 31.5–35.7)
MCV RBC AUTO: 86 FL (ref 79–97)
MONOCYTES # BLD AUTO: 0.6 X10E3/UL (ref 0.1–0.9)
MONOCYTES NFR BLD AUTO: 12 %
NEUTROPHILS # BLD AUTO: 2.1 X10E3/UL (ref 1.4–7)
NEUTROPHILS NFR BLD AUTO: 41 %
PLATELET # BLD AUTO: 272 X10E3/UL (ref 150–450)
POTASSIUM SERPL-SCNC: 4.7 MMOL/L (ref 3.5–5.2)
PROT SERPL-MCNC: 6.6 G/DL (ref 6–8.5)
RBC # BLD AUTO: 4.23 X10E6/UL (ref 3.77–5.28)
SODIUM SERPL-SCNC: 142 MMOL/L (ref 134–144)
TRIGL SERPL-MCNC: 93 MG/DL (ref 0–149)
TSH SERPL DL<=0.005 MIU/L-ACNC: 1.25 UIU/ML (ref 0.45–4.5)
VLDLC SERPL CALC-MCNC: 19 MG/DL (ref 5–40)
WBC # BLD AUTO: 5.2 X10E3/UL (ref 3.4–10.8)

## 2019-06-07 RX ORDER — ATORVASTATIN CALCIUM 40 MG/1
40 TABLET, FILM COATED ORAL DAILY
Qty: 90 TAB | Refills: 3 | Status: SHIPPED | OUTPATIENT
Start: 2019-06-07 | End: 2020-02-11 | Stop reason: ALTCHOICE

## 2019-06-07 NOTE — PROGRESS NOTES
I would like to see lower LDL cholesterol better if LDL closer to 70 so dose of atorvastatin should be increased to 40 mg will order this dose

## 2019-06-10 NOTE — PROGRESS NOTES
Called and LM on VM with results and Dr Morris Nice advice.   Asked for a call back with further discussion

## 2019-06-17 ENCOUNTER — OFFICE VISIT (OUTPATIENT)
Dept: INTERNAL MEDICINE CLINIC | Age: 84
End: 2019-06-17

## 2019-06-17 ENCOUNTER — HOSPITAL ENCOUNTER (OUTPATIENT)
Dept: LAB | Age: 84
Discharge: HOME | End: 2019-06-17
Payer: MEDICARE

## 2019-06-17 VITALS
SYSTOLIC BLOOD PRESSURE: 112 MMHG | WEIGHT: 153 LBS | OXYGEN SATURATION: 97 % | HEIGHT: 65 IN | BODY MASS INDEX: 25.49 KG/M2 | TEMPERATURE: 97.8 F | HEART RATE: 93 BPM | RESPIRATION RATE: 20 BRPM | DIASTOLIC BLOOD PRESSURE: 60 MMHG

## 2019-06-17 DIAGNOSIS — R42 INTERMITTENT LIGHTHEADEDNESS: ICD-10-CM

## 2019-06-17 DIAGNOSIS — R10.12 LEFT UPPER QUADRANT PAIN: Primary | ICD-10-CM

## 2019-06-17 PROCEDURE — 36415 COLL VENOUS BLD VENIPUNCTURE: CPT

## 2019-06-17 PROCEDURE — 82150 ASSAY OF AMYLASE: CPT

## 2019-06-17 PROCEDURE — 83690 ASSAY OF LIPASE: CPT

## 2019-06-17 NOTE — PROGRESS NOTES
HISTORY OF PRESENT ILLNESS  Kiley Dewey is a 80 y.o. female. HPI  Patient presents to the office for evaluation of abdominal pain. She states the pain is upper left abdominal pain. She states she has been having this for 1 year. She has had a headache for a long time as well. No nausea. No vomiting. Bowel movement has been okay. She states the pain was more frequently. Eating okay. She has lost some weight but she is on nutrisystem. Her goal weight is 147. She states she has also had episodes for feeling faint. This is not new either. She has seen her cardiologist but reports she forgot to tell them about it. These episodes seem to come and go. No Known Allergies  Past Medical History:   Diagnosis Date    CAD (coronary artery disease)     s/p cabg, dr Robb Darden 2000 x4 vessel disease    Hypercholesterolemia     Hypertension     Hyperthyroidism     1990's, dr Murphy Sweeney    Osteoporosis        Review of Systems   Constitutional: Negative for chills and fever. Gastrointestinal: Positive for abdominal pain. Negative for constipation, diarrhea, nausea and vomiting. Neurological: Positive for dizziness. Blood pressure 112/60, pulse 93, temperature 97.8 °F (36.6 °C), temperature source Oral, resp. rate 20, height 5' 5\" (1.651 m), weight 153 lb (69.4 kg), SpO2 97 %. Physical Exam   Constitutional: No distress. Cardiovascular: Normal rate and regular rhythm. Pulmonary/Chest: Effort normal and breath sounds normal. She has no wheezes. Abdominal: Soft. Bowel sounds are normal. There is tenderness. Left upper quadrant pain. Psychiatric: She has a normal mood and affect. Her behavior is normal. Judgment and thought content normal.       ASSESSMENT and PLAN  Diagnoses and all orders for this visit:    1. Left upper quadrant pain  -     LIPASE  -     AMYLASE  -     CT ABD WO CONT; Future    2. Intermittent lightheadedness    patient to get lab done. She will be contact to have the ct done.  I had Olesya to reach out to Dr. Nasima Harris office to see if I can get her a follow up appointment with them. I was told that his office would reach out to her. Ms. Gregoria Schaumann and I talked at length about her symptoms and at times she can be vague. She has assured me that she does not feel she would need emergent care. I asked about her shortness of breath and she states she always have this and it is not different. I told her if her symptoms worsen she is to seek emergent care. All this was discussed with the patient and she understands and agrees.

## 2019-06-17 NOTE — PROGRESS NOTES
Patient having abdominal pain comes and goes but has become progressive over 1 month, worse at night lying  Down at night. Pain is just under the left rib cage, patient states it moves at times.

## 2019-06-18 LAB
AMYLASE SERPL-CCNC: 77 U/L (ref 31–124)
LIPASE SERPL-CCNC: 19 U/L (ref 14–85)

## 2019-06-18 NOTE — PROGRESS NOTES
Writer contacted patient to inform of lab results and inquiry, patient verbalized understanding. Patient has not heard from the Cardiologist, but has not been home or checked her machine, patient will call tomorrow.

## 2019-06-18 NOTE — PROGRESS NOTES
Please let the patient know her labs are normal. Was she able to get an appointment with her cardiologist?

## 2019-06-21 ENCOUNTER — TELEPHONE (OUTPATIENT)
Dept: INTERNAL MEDICINE CLINIC | Age: 84
End: 2019-06-21

## 2019-07-11 ENCOUNTER — OFFICE VISIT (OUTPATIENT)
Dept: INTERNAL MEDICINE CLINIC | Age: 84
End: 2019-07-11

## 2019-07-11 VITALS
BODY MASS INDEX: 26.33 KG/M2 | HEART RATE: 66 BPM | TEMPERATURE: 98.2 F | WEIGHT: 158 LBS | HEIGHT: 65 IN | OXYGEN SATURATION: 97 % | RESPIRATION RATE: 16 BRPM | DIASTOLIC BLOOD PRESSURE: 70 MMHG | SYSTOLIC BLOOD PRESSURE: 155 MMHG

## 2019-07-11 DIAGNOSIS — M79.672 ACUTE PAIN OF LEFT FOOT: Primary | ICD-10-CM

## 2019-07-11 RX ORDER — PREDNISONE 20 MG/1
40 TABLET ORAL
Qty: 10 TAB | Refills: 0 | Status: SHIPPED | OUTPATIENT
Start: 2019-07-11 | End: 2019-07-16

## 2019-07-11 NOTE — PROGRESS NOTES
Problem follow up:  Jaelyn Ward returns for follow up visit regarding left foot pain. she was seen 4 days ago in [facility] diagnosed with not gout and treated with motrin and a gel did not fill gel or motrin. Workup was significant for lab for uric acid  Venous doppler. Notes, labs, studies, imaging related to this problem during prior visit were available . Since that visit, she has improved. she has not been compliant with prescribed treatment. Residual symptoms include: pain  New issues associated with this problem include: pain still. Patient Active Problem List    Diagnosis    Coronary artery disease with stable angina pectoris ([address]    History of pulmonary embolism    Calculus of gallbladder and bile duct without cholecystitis    Living will, counseling/discussion    Coronary artery disease involving autologous artery coronary bypass graft without angina pectoris     Stent 7/ 2015      Osteopenia     2010 hip -0.7.  Hyperthyroidism     Followed by Dr Elvia Peterson.  HTN (hypertension)    Hyperlipidemia with target LDL less than 70    Coronary artery disease due to calcified coronary lesion     2000. CABG. Followed by Dr Amberly Timmons. 2013 Nuclear stress test .  2015 PTCA UZAIR Ramus saphenous graft. [email or web address] Vitals:    07/11/19 1434   BP: 155/70   Pulse: 66   Resp: 16   Temp: 98.2 °F (36.8 °C)   TempSrc: Oral   SpO2: 97%   Weight: 158 lb (71.7 kg)   Height: 5' 5\" (1.651 m)     Examination of the feet reveals warm, good capillary refill, normal DP and PT pulses and left tender base 1st and 2nd metacarpal  No swelling      . 1.  Acute pain of left foot  Stp nsaids trial prednisone  Rest ice prn  Later xray if not better

## 2019-07-11 NOTE — PROGRESS NOTES
1. Have you been to the ER, urgent care clinic since your last visit? Hospitalized since your last visit? yes,  Pt has been to [facility] and [facility] for edema in feet and ankles    2. Have you seen or consulted any other health care providers outside of the [address] [place] [address] since your last visit? Include any pap smears or colon screening.  No

## 2019-07-12 RX ORDER — ESTRADIOL 10 UG/1
INSERT VAGINAL
Qty: 24 TAB | Refills: 0 | Status: SHIPPED | OUTPATIENT
Start: 2019-07-12 | End: 2019-09-28 | Stop reason: SDUPTHER

## 2019-07-18 DIAGNOSIS — M25.562 PAIN IN BOTH KNEES, UNSPECIFIED CHRONICITY: Primary | ICD-10-CM

## 2019-07-18 DIAGNOSIS — M25.561 PAIN IN BOTH KNEES, UNSPECIFIED CHRONICITY: Primary | ICD-10-CM

## 2019-09-28 RX ORDER — ESTRADIOL 10 UG/1
INSERT VAGINAL
Qty: 24 TAB | Refills: 0 | Status: SHIPPED | OUTPATIENT
Start: 2019-09-28 | End: 2019-12-16 | Stop reason: SDUPTHER

## 2019-10-07 DIAGNOSIS — R10.12 LEFT UPPER QUADRANT PAIN: ICD-10-CM

## 2019-11-07 ENCOUNTER — OFFICE VISIT (OUTPATIENT)
Dept: INTERNAL MEDICINE CLINIC | Age: 84
End: 2019-11-07

## 2019-11-07 VITALS
DIASTOLIC BLOOD PRESSURE: 58 MMHG | WEIGHT: 164 LBS | RESPIRATION RATE: 18 BRPM | HEIGHT: 65 IN | HEART RATE: 61 BPM | OXYGEN SATURATION: 99 % | TEMPERATURE: 96.5 F | BODY MASS INDEX: 27.32 KG/M2 | SYSTOLIC BLOOD PRESSURE: 144 MMHG

## 2019-11-07 DIAGNOSIS — H81.10 BENIGN PAROXYSMAL POSITIONAL VERTIGO, UNSPECIFIED LATERALITY: Primary | ICD-10-CM

## 2019-11-07 DIAGNOSIS — Z86.711 HISTORY OF PULMONARY EMBOLISM: ICD-10-CM

## 2019-11-07 NOTE — PROGRESS NOTES
1. Have you been to the ER, urgent care clinic since your last visit? Hospitalized since your last visit? [facility] doesn;t remember what for    2. Have you seen or consulted any other health care providers outside of the [address] [place] [address] since your last visit? Include any pap smears or colon screening.  No

## 2019-11-07 NOTE — PROGRESS NOTES
Subjective:      Patient : This patient is a 80 y.o. Gerldine Prost .female that presents with a chief complaint of dizziness. \"Dizziness\" is described as an illusion of spinning and lghtheadedness*. Symptoms began rapid  when the patient turned their head. Symptoms lasted on and off 6 days . They are improved by sitting up. The dizziness has been accompanied by nausea. The patient has not had a recent head injury or viral illness. The patient denies changes in prescription medications/over-the-counter medications/ herbal medications/recreational drug use/smoking/alcohol consumption. Objective:     Visit Vitals  /58 (BP 1 Location: Left arm, BP Patient Position: Sitting)   Pulse 61   Temp 96.5 °F (35.8 °C) (Oral)   Resp 18   Ht 5' 5\" (1.651 m)   Wt 164 lb (74.4 kg)   SpO2 99%   BMI 27.29 kg/m²       Skin:  warm  HEENT:  SERA  Heart regular rhythm  Lungs: clear to auscultation and percussion throughout both lung fields  CV:normal  Abdomen   Extremeties:  Neuro alert, oriented x 3, no focal deficits and romberg slightly positive. Rajani-Hallpike Test done and shows       Past Medical History:   Diagnosis Date    CAD (coronary artery disease)     s/p cabg, dr Marcel Powell 2000 x4 vessel disease    Hypercholesterolemia     Hypertension     Hyperthyroidism     1's, dr Bhupendra Raygoza    Osteoporosis      Family History   Problem Relation Age of Onset    Cancer Mother 48        breast     Stroke Mother     Cancer Father         lung    Lung Disease Father     Heart Disease Sister      Current Outpatient Medications   Medication Sig Dispense Refill    estradiol (YUVAFEM) 10 mcg tab vaginal tablet USE 1 TABLET VAGINALLY ON TUESDAYS AND FRIDAYS 24 Tab 0    atorvastatin (LIPITOR) 40 mg tablet Take 1 Tab by mouth daily. 90 Tab 3    lisinopril (PRINIVIL, ZESTRIL) 10 mg tablet Take 1 Tab by mouth daily. 90 Tab 1    cyclobenzaprine (FLEXERIL) 10 mg tablet Take 10 mg by mouth.       acetaminophen (TYLENOL EXTRA STRENGTH) 500 mg tablet Take 2 Tabs by mouth.  isosorbide mononitrate ER (IMDUR) 30 mg tablet TAKE 1 TABLET BY MOUTH EVERY DAY IN THE MORNING  2    rivaroxaban (XARELTO) 15 mg tab tablet Take 1 Tab by mouth two (2) times daily (with meals). (Patient taking differently: Take 15 mg by mouth two (2) times daily (with meals). Indications: Patient has 30mg tablets taking 30 mg daily) 90 Tab 3    acetaminophen (TYLENOL) 325 mg tablet Take 650 mg by mouth every four (4) hours as needed for Pain.  methIMAzole (TAPAZOLE) 10 mg tablet Take 5 mg by mouth daily.  aspirin delayed-release 81 mg tablet Take 81 mg by mouth daily.  docusate sodium (COLACE) 100 mg capsule Take 50 mg by mouth two (2) times a day.       nitroglycerin (NITROSTAT) 0.4 mg SL tablet DISSOLVE 1 TABLET UNDER TONGUE EVERY 5 MINUTES AS NEEDED FOR CHEST PAIN 25 Tab 0     No Known Allergies  Social History     Socioeconomic History    Marital status:      Spouse name: Not on file    Number of children: Not on file    Years of education: Not on file    Highest education level: Not on file   Occupational History    Not on file   Social Needs    Financial resource strain: Not on file    Food insecurity:     Worry: Not on file     Inability: Not on file    Transportation needs:     Medical: Not on file     Non-medical: Not on file   Tobacco Use    Smoking status: Never Smoker    Smokeless tobacco: Never Used   Substance and Sexual Activity    Alcohol use: No    Drug use: No    Sexual activity: Yes     Partners: Male   Lifestyle    Physical activity:     Days per week: Not on file     Minutes per session: Not on file    Stress: Not on file   Relationships    Social connections:     Talks on phone: Not on file     Gets together: Not on file     Attends [employer] service: Not on file     Active member of club or organization: Not on file     Attends meetings of clubs or organizations: Not on file     Relationship status: Not on file   [facility] Intimate partner violence:     Fear of current or ex partner: Not on file     Emotionally abused: Not on file     Physically abused: Not on file     Forced sexual activity: Not on file   Other Topics Concern    Not on file   Social History Narrative    Lives with . 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 Will consider rehab  With vestibular rehab The somnonlent and drying effects of the drug have been explained to the patient. Sleep HOB 30 degrees      In addition to this problem, the patient comes in and wants to talk to be about her blood thinners. In March she had a total knee replacement and in late March she was diagnosed with a pulmonary embolism and placed on 15 mg twice a day Xarelto and no one has really followed her, and she has continued on the 15 twice a day. She went to cardiology a couple weeks ago, saw the nurse practitioner, and they told her she was taking too big of a dose and they lowered the dose to one a day, 15 mg. She has had no bleeding issues. She feels fine, breathing is back to baseline. This was her first PE and this was precipitated by a TKR. Therefore, a short course of anticoagulation is indicated. My recommendation would be to finish up whatever she has of Xarelto, one daily, and then stop it because she has really been on anticoagulants for about eight months and we know the cause and she has never had a recurrence previously. I did warn her that she has a higher chance of another PE in the future. Diagnoses and all orders for this visit:    1. Benign paroxysmal positional vertigo, unspecified laterality    2.  History of pulmonary embolism      Prolonged visit with 15 minutes of time out  of more than a  25 minute visit spent counseling patient and family and formulation of care

## 2019-11-22 ENCOUNTER — TELEPHONE (OUTPATIENT)
Dept: INTERNAL MEDICINE CLINIC | Age: 84
End: 2019-11-22

## 2019-11-22 DIAGNOSIS — H81.10 BENIGN PAROXYSMAL POSITIONAL VERTIGO, UNSPECIFIED LATERALITY: Primary | ICD-10-CM

## 2019-11-22 NOTE — TELEPHONE ENCOUNTER
Patient requesting order for [facility] for dizziness (H81.1) patient will see Juan Loge on 11/25/2019 Please fax to [phone]

## 2019-12-17 RX ORDER — ESTRADIOL 10 UG/1
INSERT VAGINAL
Qty: 24 TAB | Refills: 0 | Status: SHIPPED | OUTPATIENT
Start: 2019-12-17 | End: 2020-03-16

## 2020-02-06 ENCOUNTER — OFFICE VISIT (OUTPATIENT)
Dept: INTERNAL MEDICINE CLINIC | Age: 85
End: 2020-02-06

## 2020-02-06 ENCOUNTER — HOSPITAL ENCOUNTER (OUTPATIENT)
Dept: LAB | Age: 85
Discharge: HOME | End: 2020-02-06

## 2020-02-06 VITALS
RESPIRATION RATE: 18 BRPM | HEART RATE: 63 BPM | WEIGHT: 161 LBS | HEIGHT: 65 IN | SYSTOLIC BLOOD PRESSURE: 132 MMHG | TEMPERATURE: 97.4 F | BODY MASS INDEX: 26.82 KG/M2 | DIASTOLIC BLOOD PRESSURE: 57 MMHG

## 2020-02-06 DIAGNOSIS — M79.10 MYALGIA: ICD-10-CM

## 2020-02-06 DIAGNOSIS — I10 ESSENTIAL HYPERTENSION: ICD-10-CM

## 2020-02-06 DIAGNOSIS — M35.3 PMR (POLYMYALGIA RHEUMATICA) (HCC): ICD-10-CM

## 2020-02-06 DIAGNOSIS — D50.8 OTHER IRON DEFICIENCY ANEMIA: ICD-10-CM

## 2020-02-06 DIAGNOSIS — M79.10 MYALGIA: Primary | ICD-10-CM

## 2020-02-06 DIAGNOSIS — Z00.00 MEDICARE ANNUAL WELLNESS VISIT, SUBSEQUENT: ICD-10-CM

## 2020-02-06 DIAGNOSIS — R20.2 TINGLING IN EXTREMITIES: ICD-10-CM

## 2020-02-06 DIAGNOSIS — I73.9 PERIPHERAL VASCULAR DISEASE (HCC): ICD-10-CM

## 2020-02-06 LAB
ALBUMIN SERPL-MCNC: 4.1 G/DL (ref 3.5–5)
ALBUMIN/GLOB SERPL: 1.4 {RATIO} (ref 1.1–2.2)
ALP SERPL-CCNC: 83 U/L (ref 45–117)
ALT SERPL-CCNC: 29 U/L (ref 12–78)
ANION GAP SERPL CALC-SCNC: 3 MMOL/L (ref 5–15)
AST SERPL-CCNC: 22 U/L (ref 15–37)
BASOPHILS # BLD: 0 K/UL (ref 0–0.1)
BASOPHILS NFR BLD: 1 % (ref 0–1)
BILIRUB SERPL-MCNC: 0.5 MG/DL (ref 0.2–1)
BUN SERPL-MCNC: 16 MG/DL (ref 6–20)
BUN/CREAT SERPL: 27 (ref 12–20)
CALCIUM SERPL-MCNC: 9.6 MG/DL (ref 8.5–10.1)
CHLORIDE SERPL-SCNC: 107 MMOL/L (ref 97–108)
CHOLEST SERPL-MCNC: 164 MG/DL
CK SERPL-CCNC: 48 U/L (ref 26–192)
CO2 SERPL-SCNC: 28 MMOL/L (ref 21–32)
CREAT SERPL-MCNC: 0.59 MG/DL (ref 0.55–1.02)
CRP SERPL-MCNC: <0.29 MG/DL (ref 0–0.6)
DIFFERENTIAL METHOD BLD: ABNORMAL
EOSINOPHIL # BLD: 0.1 K/UL (ref 0–0.4)
EOSINOPHIL NFR BLD: 1 % (ref 0–7)
ERYTHROCYTE [DISTWIDTH] IN BLOOD BY AUTOMATED COUNT: 14.7 % (ref 11.5–14.5)
ERYTHROCYTE [SEDIMENTATION RATE] IN BLOOD: 14 MM/HR (ref 0–30)
GLOBULIN SER CALC-MCNC: 3 G/DL (ref 2–4)
GLUCOSE SERPL-MCNC: 87 MG/DL (ref 65–100)
HCT VFR BLD AUTO: 41.2 % (ref 35–47)
HDLC SERPL-MCNC: 61 MG/DL
HDLC SERPL: 2.7 {RATIO} (ref 0–5)
HGB BLD-MCNC: 12.5 G/DL (ref 11.5–16)
IMM GRANULOCYTES # BLD AUTO: 0 K/UL (ref 0–0.04)
IMM GRANULOCYTES NFR BLD AUTO: 0 % (ref 0–0.5)
IRON SATN MFR SERPL: 22 % (ref 20–50)
IRON SERPL-MCNC: 68 UG/DL (ref 35–150)
LDLC SERPL CALC-MCNC: 86 MG/DL (ref 0–100)
LIPID PROFILE,FLP: NORMAL
LYMPHOCYTES # BLD: 2.1 K/UL (ref 0.8–3.5)
LYMPHOCYTES NFR BLD: 38 % (ref 12–49)
MAGNESIUM SERPL-MCNC: 2.2 MG/DL (ref 1.6–2.4)
MCH RBC QN AUTO: 27.7 PG (ref 26–34)
MCHC RBC AUTO-ENTMCNC: 30.3 G/DL (ref 30–36.5)
MCV RBC AUTO: 91.4 FL (ref 80–99)
MONOCYTES # BLD: 0.5 K/UL (ref 0–1)
MONOCYTES NFR BLD: 10 % (ref 5–13)
NEUTS SEG # BLD: 2.8 K/UL (ref 1.8–8)
NEUTS SEG NFR BLD: 50 % (ref 32–75)
NRBC # BLD: 0 K/UL (ref 0–0.01)
NRBC BLD-RTO: 0 PER 100 WBC
PLATELET # BLD AUTO: 185 K/UL (ref 150–400)
PMV BLD AUTO: 12.5 FL (ref 8.9–12.9)
POTASSIUM SERPL-SCNC: 5 MMOL/L (ref 3.5–5.1)
PROT SERPL-MCNC: 7.1 G/DL (ref 6.4–8.2)
RBC # BLD AUTO: 4.51 M/UL (ref 3.8–5.2)
SODIUM SERPL-SCNC: 138 MMOL/L (ref 136–145)
TIBC SERPL-MCNC: 315 UG/DL (ref 250–450)
TRIGL SERPL-MCNC: 85 MG/DL (ref ?–150)
URATE SERPL-MCNC: 4.1 MG/DL (ref 2.6–6)
VIT B12 SERPL-MCNC: 377 PG/ML (ref 193–986)
VLDLC SERPL CALC-MCNC: 17 MG/DL
WBC # BLD AUTO: 5.5 K/UL (ref 3.6–11)

## 2020-02-06 NOTE — PATIENT INSTRUCTIONS
Medicare Wellness Visit, Female The best way to live healthy is to have a lifestyle where you eat a well-balanced diet, exercise regularly, limit alcohol use, and quit all forms of tobacco/nicotine, if applicable. Regular preventive services are another way to keep healthy. Preventive services (vaccines, screening tests, monitoring & exams) can help personalize your care plan, which helps you manage your own care. Screening tests can find health problems at the earliest stages, when they are easiest to treat. [facility] follows the current, evidence-based guidelines published by the [facility] ([facility]) when recommending preventive services for our patients. Because we follow these guidelines, sometimes recommendations change over time as research supports it. (For example, mammograms used to be recommended annually. Even though Medicare will still pay for an annual mammogram, the newer guidelines recommend a mammogram every two years for women of average risk). Of course, you and your doctor may decide to screen more often for some diseases, based on your risk and your co-morbidities (chronic disease you are already diagnosed with). Preventive services for you include: - Medicare offers their members a free annual wellness visit, which is time for you and your primary care provider to discuss and plan for your preventive service needs. Take advantage of this benefit every year! 
-All adults over the age of 72 should receive the recommended pneumonia vaccines. Current USPSTF guidelines recommend a series of two vaccines for the best pneumonia protection.  
-All adults should have a flu vaccine yearly and a tetanus vaccine every 10 years.  
-All adults age 48 and older should receive the shingles vaccines (series of two vaccines). -All adults age 38-68 who are overweight should have a diabetes screening test once every three years. -All adults born between [birth year] and 1965 should be screened once for Hepatitis C. 
-Other screening tests and preventive services for persons with diabetes include: an eye exam to screen for diabetic retinopathy, a kidney function test, a foot exam, and stricter control over your cholesterol.  
-Cardiovascular screening for adults with routine risk involves an electrocardiogram (ECG) at intervals determined by your doctor.  
-Colorectal cancer screenings should be done for adults age 54-65 with no increased risk factors for colorectal cancer. There are a number of acceptable methods of screening for this type of cancer. Each test has its own benefits and drawbacks. Discuss with your doctor what is most appropriate for you during your annual wellness visit. The different tests include: colonoscopy (considered the best screening method), a fecal occult blood test, a fecal DNA test, and sigmoidoscopy. 
 
-A bone mass density test is recommended when a woman turns 65 to screen for osteoporosis. This test is only recommended one time, as a screening. Some providers will use this same test as a disease monitoring tool if you already have osteoporosis. -Breast cancer screenings are recommended every other year for women of normal risk, age 54-69. 
-Cervical cancer screenings for women over age 72 are only recommended with certain risk factors. Here is a list of your current Health Maintenance items (your personalized list of preventive services) with a due date: 
Health Maintenance Due Topic Date Due  
 DTaP/Tdap/Td  (1 - Tdap) 12/20/1946  Shingles Vaccine (1 of 2) 12/20/1985  Glaucoma Screening   12/20/2000  Flu Vaccine  08/01/2019 Verlinda Smoker Annual Well Visit  11/09/2019

## 2020-02-06 NOTE — PROGRESS NOTES
This is the Subsequent Medicare Annual Wellness Exam, performed 12 months or more after the Initial AWV or the last Subsequent AWV    I have reviewed the patient's medical history in detail and updated the computerized patient record.      History     Patient Active Problem List   Diagnosis Code    Hyperlipidemia with target LDL less than 70 E78.5    Coronary artery disease due to calcified coronary lesion I25.10, I25.84    Hyperthyroidism E05.90    HTN (hypertension) I10    Osteopenia M85.80    Coronary artery disease involving autologous artery coronary bypass graft without angina pectoris I25.810    Living will, counseling/discussion Z71.89    Calculus of gallbladder and bile duct without cholecystitis K80.70    History of pulmonary embolism Z86.711    Coronary artery disease with stable angina pectoris (HCC) I25.118    Peripheral vascular disease ([facility] [address] I73.9     Past Medical History:   Diagnosis Date    CAD (coronary artery disease)     s/p cabg, dr Nusrat Moy 2000 x4 vessel disease    Hypercholesterolemia     Hypertension     Hyperthyroidism     1990's, dr Bg Pineda    Osteoporosis       Past Surgical History:   Procedure Laterality Date    CABG, ARTERY-VEIN, FOUR  2000    heart surgery at [facility]    HX BLADDER SUSPENSION      dr Edwards Speaker bolden    HX COLONOSCOPY  2011    HX HYSTERECTOMY  2003    HX BRIAN AND BSO      2002    HX TRACHEOSTOMY      whooping cough age 3    VA ANESTH,KNEE AREA SURGERY      tkr, right; 2008    VA BIOPSY OF BREAST, INCISIONAL  1985    LEFT breast     Current Outpatient Medications   Medication Sig Dispense Refill    varicella-zoster recombinant, PF, (SHINGRIX, PF,) 50 mcg/0.5 mL susr injection 0.5mL by IntraMUSCular route once now and then repeat in 2-6 months 0.5 mL 1    estradiol (YUVAFEM) 10 mcg tab vaginal tablet USE 1 TABLET VAGINALLY ON TUESDAYS AND FRIDAYS 24 Tab 0    nitroglycerin (NITROSTAT) 0.4 mg SL tablet DISSOLVE 1 TABLET UNDER TONGUE EVERY 5 MINUTES AS NEEDED FOR CHEST PAIN 25 Tab 3    atorvastatin (LIPITOR) 40 mg tablet Take 1 Tab by mouth daily. 90 Tab 3    lisinopril (PRINIVIL, ZESTRIL) 10 mg tablet Take 1 Tab by mouth daily. 90 Tab 1    cyclobenzaprine (FLEXERIL) 10 mg tablet Take 10 mg by mouth.  acetaminophen (TYLENOL EXTRA STRENGTH) 500 mg tablet Take 2 Tabs by mouth.  isosorbide mononitrate ER (IMDUR) 30 mg tablet TAKE 1 TABLET BY MOUTH EVERY DAY IN THE MORNING  2    rivaroxaban (XARELTO) 15 mg tab tablet Take 1 Tab by mouth two (2) times daily (with meals). (Patient taking differently: Take 15 mg by mouth two (2) times daily (with meals). Indications: Patient has 30mg tablets taking 30 mg daily) 90 Tab 3    acetaminophen (TYLENOL) 325 mg tablet Take 650 mg by mouth every four (4) hours as needed for Pain.  methIMAzole (TAPAZOLE) 10 mg tablet Take 5 mg by mouth daily.  aspirin delayed-release 81 mg tablet Take 81 mg by mouth daily.  docusate sodium (COLACE) 100 mg capsule Take 50 mg by mouth two (2) times a day. No Known Allergies    Family History   Problem Relation Age of Onset    Cancer Mother 48        breast     Stroke Mother     Cancer Father         lung    Lung Disease Father     Heart Disease Sister      Social History     Tobacco Use    Smoking status: Never Smoker    Smokeless tobacco: Never Used   Substance Use Topics    Alcohol use: No       Depression Risk Factor Screening:     3 most recent PHQ Screens 2/6/2020   PHQ Not Done -   Little interest or pleasure in doing things Not at all   Feeling down, depressed, irritable, or hopeless Not at all   Total Score PHQ 2 0       Alcohol Risk Factor Screening:   Do you average 1 drink per night or more than 7 drinks a week: On any one occasion in the past three months have you have had more than 3 drinks containing alcohol:        Functional Ability and Level of Safety:   Hearing: The patient needs further evaluation.     Activities of Daily Living: The home contains: handrails  Patient does total self care    Ambulation: with mild difficulty    Fall Risk:  Fall Risk Assessment, last 12 mths 6/17/2019   Able to walk? Yes   Fall in past 12 months? Yes   Fall with injury? No   Number of falls in past 12 months 1   Fall Risk Score 1       Abuse Screen:  Patient is not abused    Cognitive Screening   Has your family/caregiver stated any concerns about your memory: no  Cognitive Screening: Normal - Verbal Fluency Test    Patient Care Team   Patient Care Team:  Roque Gonzales MD as PCP - General (Internal Medicine)  Roque Gonzales MD [facility]  Milton Prado MD (Cardiology)  León Jordan MD (Internal Medicine)  Marcia Dennis DO (Orthopedic Surgery)    Assessment/Plan   Education and counseling provided:  Are appropriate based on today's review and evaluation    Diagnoses and all orders for this visit:    1. Myalgia  -     URIC ACID; Future  -     SED RATE (ESR); Future  -     MAGNESIUM; Future  -     CK; Future  -     C REACTIVE PROTEIN, QT; Future    2. PMR (polymyalgia rheumatica) (HCC)  -     CBC WITH AUTOMATED DIFF; Future  -     METABOLIC PANEL, COMPREHENSIVE; Future    3. Peripheral vascular disease ([facility] [address]    4. Tingling in extremities  -     VITAMIN B12; Future    5. Other iron deficiency anemia  -     IRON PROFILE; Future    6.  Medicare annual wellness visit, subsequent  -     varicella-zoster recombinant, PF, (SHINGRIX, PF,) 50 mcg/0.5 mL susr injection; 0.5mL by IntraMUSCular route once now and then repeat in 2-6 months        Health Maintenance Due   Topic Date Due    DTaP/Tdap/Td series (1 - Tdap) 12[birth year]20/1946    Shingrix Vaccine Age 50> (1 of 2) 12/20/1985    GLAUCOMA SCREENING Q2Y  12/20/2000    Influenza Age 5 to Adult  08/01/2019    Medicare Yearly Exam  11/09/2019     Subjective:  She is an 80year old white female who 20 years ago had coronary bypass surgery, five to six years ago had a remote MI that was not amenable to PTCA. She has done reasonably well. Sees cardiology regularly. About three weeks ago she was having pain in her right ear, was coming and going. It was a pounding sensation. She says it has never really resolved and she wonders what it is. She says it is not there today. She says in addition over the last three weeks she had sudden onset of myalgia, a lot of pain in her upper extremities in the muscles, pain in her neck area, pain in her thighs and lower legs a little bit. She has had some tingling in her hands from Raynaud's. She has remained pretty active, is not having chest pain. She wonders if this could be coming from her medications or what this is caused by. She has degenerative arthritis, she has had both knees replaced, the second one replaced 11 months ago, and she is still in rehab. She was seen here for vertigo and balance issues back in the fall and she is still in PT for that and that has improved a lot. Her BP has been controlled and other issues have been fairly well controlled. Denies jaw claudication, denies pain in TM joint. There has been no change in her vision, no joint swelling. There has been no particular rash. Vitals:    02/06/20 0826   BP: 132/57   Pulse: 63   Resp: 18   Temp: 97.4 °F (36.3 °C)   TempSrc: Oral   Weight: 161 lb (73 kg)   Height: 5' 5\" (1.651 m)     bilat ears were normal to exam      1. Myalgia  Unclear cause ? Statin PMR  - URIC ACID; Future  - SED RATE (ESR); Future  - MAGNESIUM; Future  - CK; Future  - C REACTIVE PROTEIN, QT; Future    2. PMR (polymyalgia rheumatica) (HCC)  possible  - CBC WITH AUTOMATED DIFF; Future  - METABOLIC PANEL, COMPREHENSIVE; Future    3. Peripheral vascular disease ([address]  known    4. Tingling in extremities  follow  - VITAMIN B12; Future    5. Other iron deficiency anemia  Last HB was 11  - IRON PROFILE; Future    6.  Medicare annual wellness visit, subsequent  Reviewed issues  - varicella-zoster recombinant, PF, (SHINGRIX, PF,) 50 mcg/0.5 mL susr injection; 0.5mL by IntraMUSCular route once now and then repeat in 2-6 months  Dispense: 0.5 mL; Refill: 1    7. Essential hypertension  Follow last was > 100  - LIPID PANEL;  Future

## 2020-02-06 NOTE — PROGRESS NOTES
1. Have you been to the ER, urgent care clinic since your last visit? Hospitalized since your last visit? No    2. Have you seen or consulted any other health care providers outside of the [address] [place] [address] since your last visit? Include any pap smears or colon screening.  No

## 2020-02-10 NOTE — PROGRESS NOTES
Lab work is all normal  No obvious cause of all the myalgia    Advise switch  To crestor 40 ng instead of atorvastatin 40 to see if better     I will order the change after you talk with her

## 2020-02-10 NOTE — PROGRESS NOTES
Called and gave Ms Horton Wendy the lab results.   Will let provider know she has been notified so that she can change medications

## 2020-02-11 RX ORDER — ROSUVASTATIN CALCIUM 40 MG/1
40 TABLET, COATED ORAL
Qty: 90 TAB | Refills: 3 | Status: SHIPPED | OUTPATIENT
Start: 2020-02-11 | End: 2020-09-17 | Stop reason: ALTCHOICE

## 2020-02-28 ENCOUNTER — TELEPHONE (OUTPATIENT)
Dept: INTERNAL MEDICINE CLINIC | Age: 85
End: 2020-02-28

## 2020-06-01 ENCOUNTER — TELEPHONE (OUTPATIENT)
Dept: INTERNAL MEDICINE CLINIC | Age: 85
End: 2020-06-01

## 2020-06-02 RX ORDER — VALSARTAN 40 MG/1
TABLET ORAL DAILY
COMMUNITY

## 2020-06-02 NOTE — TELEPHONE ENCOUNTER
Patient wanted to go over her medication list to make sure it has been updated. Patient states she is now taking Valsartan 40mg daily prescribed by Dr. Negro Sky. Reviewed our list of her medications and patient doesn't think she is taking Lisinopril.

## 2020-07-30 RX ORDER — RIVAROXABAN 20 MG/1
TABLET, FILM COATED ORAL
Qty: 90 TAB | Refills: 1 | Status: SHIPPED | OUTPATIENT
Start: 2020-07-30 | End: 2021-02-10

## 2020-09-17 ENCOUNTER — OFFICE VISIT (OUTPATIENT)
Dept: INTERNAL MEDICINE CLINIC | Age: 85
End: 2020-09-17
Payer: MEDICARE

## 2020-09-17 VITALS
OXYGEN SATURATION: 98 % | RESPIRATION RATE: 16 BRPM | DIASTOLIC BLOOD PRESSURE: 60 MMHG | TEMPERATURE: 95 F | SYSTOLIC BLOOD PRESSURE: 133 MMHG | BODY MASS INDEX: 27.62 KG/M2 | WEIGHT: 166 LBS | HEART RATE: 84 BPM

## 2020-09-17 DIAGNOSIS — Z86.711 HISTORY OF PULMONARY EMBOLISM: ICD-10-CM

## 2020-09-17 DIAGNOSIS — Z23 NEEDS FLU SHOT: Primary | ICD-10-CM

## 2020-09-17 DIAGNOSIS — E78.5 DYSLIPIDEMIA, GOAL LDL BELOW 70: ICD-10-CM

## 2020-09-17 DIAGNOSIS — I10 ESSENTIAL HYPERTENSION: ICD-10-CM

## 2020-09-17 DIAGNOSIS — I25.118 CORONARY ARTERY DISEASE OF NATIVE ARTERY OF NATIVE HEART WITH STABLE ANGINA PECTORIS (HCC): ICD-10-CM

## 2020-09-17 PROCEDURE — G8419 CALC BMI OUT NRM PARAM NOF/U: HCPCS | Performed by: INTERNAL MEDICINE

## 2020-09-17 PROCEDURE — 90694 VACC AIIV4 NO PRSRV 0.5ML IM: CPT | Performed by: INTERNAL MEDICINE

## 2020-09-17 PROCEDURE — G8754 DIAS BP LESS 90: HCPCS | Performed by: INTERNAL MEDICINE

## 2020-09-17 PROCEDURE — G8536 NO DOC ELDER MAL SCRN: HCPCS | Performed by: INTERNAL MEDICINE

## 2020-09-17 PROCEDURE — 1100F PTFALLS ASSESS-DOCD GE2>/YR: CPT | Performed by: INTERNAL MEDICINE

## 2020-09-17 PROCEDURE — 1090F PRES/ABSN URINE INCON ASSESS: CPT | Performed by: INTERNAL MEDICINE

## 2020-09-17 PROCEDURE — G0463 HOSPITAL OUTPT CLINIC VISIT: HCPCS | Performed by: INTERNAL MEDICINE

## 2020-09-17 PROCEDURE — G8399 PT W/DXA RESULTS DOCUMENT: HCPCS | Performed by: INTERNAL MEDICINE

## 2020-09-17 PROCEDURE — 99214 OFFICE O/P EST MOD 30 MIN: CPT | Performed by: INTERNAL MEDICINE

## 2020-09-17 PROCEDURE — G8752 SYS BP LESS 140: HCPCS | Performed by: INTERNAL MEDICINE

## 2020-09-17 PROCEDURE — G8510 SCR DEP NEG, NO PLAN REQD: HCPCS | Performed by: INTERNAL MEDICINE

## 2020-09-17 PROCEDURE — 3288F FALL RISK ASSESSMENT DOCD: CPT | Performed by: INTERNAL MEDICINE

## 2020-09-17 PROCEDURE — G8427 DOCREV CUR MEDS BY ELIG CLIN: HCPCS | Performed by: INTERNAL MEDICINE

## 2020-09-17 RX ORDER — ATORVASTATIN CALCIUM 40 MG/1
40 TABLET, FILM COATED ORAL DAILY
Qty: 90 TAB | Refills: 3
Start: 2020-09-17 | End: 2020-10-01 | Stop reason: ALTCHOICE

## 2020-09-17 RX ORDER — BUMETANIDE 1 MG/1
TABLET ORAL
COMMUNITY
Start: 2020-07-27

## 2020-09-17 RX ORDER — CYCLOBENZAPRINE HCL 5 MG
5 TABLET ORAL
Qty: 90 TAB | Refills: 1 | Status: SHIPPED | OUTPATIENT
Start: 2020-09-17 | End: 2021-02-10

## 2020-09-17 RX ORDER — EZETIMIBE 10 MG/1
10 TABLET ORAL DAILY
Qty: 90 TAB | Refills: 3 | Status: SHIPPED | OUTPATIENT
Start: 2020-09-17 | End: 2021-03-17 | Stop reason: SDUPTHER

## 2020-09-17 NOTE — PROGRESS NOTES
Medicare Yearly Exam  11/09/2019     Subjective:  She is an 80year old white female who 20 years ago had coronary bypass surgery, five to six years ago had a remote MI that was not amenable to PTCA. She has done reasonably well. Sees cardiology regularly. About three weeks ago she was having pain in her right ear, was coming and going. It was a pounding sensation. She says it has never really resolved and she wonders what it is. She says it is not there today. She says in addition over the last three weeks she had sudden onset of myalgia, a lot of pain in her upper extremities in the muscles, pain in her neck area, pain in her thighs and lower legs a little bit. She has had some tingling in her hands from Raynaud's. She has remained pretty active, is not having chest pain. She wonders if this could be coming from her medications or what this is caused by. She has degenerative arthritis, she has had both knees replaced, the second one replaced 11 months ago, and she is still in rehab. She was seen here for vertigo and balance issues back in the fall and she is still in PT for that and that has improved a lot. Her BP has been controlled and other issues have been fairly well controlled. Denies jaw claudication, denies pain in TM joint. There has been no change in her vision, no joint swelling. There has been no particular rash.   not sure   What statin she is on      Vitals:     Cardiology no changes   Endo no changes      Vitals:    09/17/20 1416   BP: 133/60   Pulse: 84   Resp: 16   Temp: (!) 95 °F (35 °C)   TempSrc: Temporal   SpO2: 98%   Weight: 166 lb (75.3 kg)     no apparent distress  Reg rhythm  Sternotomy  Clear lungs  Mood good      1. Needs flu shot  now  - FLU (FLUAD QUAD INFLUENZA VACCINE,QUAD,ADJUVANTED)    2. Coronary artery disease of native artery of native heart with stable angina pectoris ([address]  stable    3. History of pulmonary embolism  On noac    4.  Essential hypertension  control    5. Dyslipidemia, goal LDL below 70  Last ldl not at goal add zetia 10 mg now repeat lab 3 months  Requested Prescriptions     Signed Prescriptions Disp Refills    ezetimibe (ZETIA) 10 mg tablet 90 Tab 3     Sig: Take 1 Tab by mouth daily.  cyclobenzaprine (FLEXERIL) 5 mg tablet 90 Tab 1     Sig: Take 1 Tab by mouth nightly.  varicella-zoster recombinant, PF, (SHINGRIX) 50 mcg/0.5 mL susr injection 0.5 mL 0     Si.5 mL by IntraMUSCular route once for 1 dose. Indications: prevention of shingles    atorvastatin (LIPITOR) 40 mg tablet 90 Tab 3     Sig: Take 1 Tab by mouth daily.

## 2020-09-17 NOTE — PROGRESS NOTES
1. Have you been to the ER, urgent care clinic since your last visit? Hospitalized since your last visit? No    2. Have you seen or consulted any other health care providers outside of the [address] [place] [address] since your last visit? Include any pap smears or colon screening.  No   Chief Complaint   Patient presents with    Fatigue    Leg Pain     left leg pain mostly at night

## 2020-09-17 NOTE — PATIENT INSTRUCTIONS
Vaccine Information Statement Influenza (Flu) Vaccine (Inactivated or Recombinant): What You Need to Know Many Vaccine Information Statements are available in [place] and other languages. See www.immunize.org/vis Hojas de información sobre vacunas están disponibles en español y en muchos otros idiomas. Visite www.immunize.org/vis 1. Why get vaccinated? Influenza vaccine can prevent influenza (flu). Flu is a contagious disease that spreads around the [facility] every year, usually between October and May. Anyone can get the flu, but it is more dangerous for some people. Infants and young children, people 72years of age and older, pregnant women, and people with certain health conditions or a weakened immune system are at greatest risk of flu complications. Pneumonia, bronchitis, sinus infections and ear infections are examples of flu-related complications. If you have a medical condition, such as heart disease, cancer or diabetes, flu can make it worse. Flu can cause fever and chills, sore throat, muscle aches, fatigue, cough, headache, and runny or stuffy nose. Some people may have vomiting and diarrhea, though this is more common in children than adults. Each year thousands of people in the [facility] die from flu, and many more are hospitalized. Flu vaccine prevents millions of illnesses and flu-related visits to the doctor each year. 2. Influenza vaccines CDC recommends everyone 10months of age and older get vaccinated every flu season. Children 6 months through 6years of age may need 2 doses during a single flu season. Everyone else needs only 1 dose each flu season. It takes about 2 weeks for protection to develop after vaccination. There are many flu viruses, and they are always changing. Each year a new flu vaccine is made to protect against three or four viruses that are likely to cause disease in the upcoming flu season.  Even when the vaccine doesnt exactly match these viruses, it may still provide some protection. Influenza vaccine does not cause flu. Influenza vaccine may be given at the same time as other vaccines. 3. Talk with your health care provider Tell your vaccine provider if the person getting the vaccine: 
 Has had an allergic reaction after a previous dose of influenza vaccine, or has any severe, life-threatening allergies.  Has ever had Guillain-Barré Syndrome (also called GBS). In some cases, your health care provider may decide to postpone influenza vaccination to a future visit. People with minor illnesses, such as a cold, may be vaccinated. People who are moderately or severely ill should usually wait until they recover before getting influenza vaccine. Your health care provider can give you more information. 4. Risks of a reaction  Soreness, redness, and swelling where shot is given, fever, muscle aches, and headache can happen after influenza vaccine.  There may be a very small increased risk of Guillain-Barré Syndrome (GBS) after inactivated influenza vaccine (the flu shot). Darby Haro children who get the flu shot along with pneumococcal vaccine (PCV13), and/or DTaP vaccine at the same time might be slightly more likely to have a seizure caused by fever. Tell your health care provider if a child who is getting flu vaccine has ever had a seizure. People sometimes faint after medical procedures, including vaccination. Tell your provider if you feel dizzy or have vision changes or ringing in the ears. As with any medicine, there is a very remote chance of a vaccine causing a severe allergic reaction, other serious injury, or death. 5. What if there is a serious problem? An allergic reaction could occur after the vaccinated person leaves the clinic.  If you see signs of a severe allergic reaction (hives, swelling of the face and throat, difficulty breathing, a fast heartbeat, dizziness, or weakness), call 9-1-1 and get the person to the nearest hospital. 
 
For other signs that concern you, call your health care provider. Adverse reactions should be reported to the Vaccine Adverse Event Reporting System (VAERS). Your health care provider will usually file this report, or you can do it yourself. Visit the VAERS website at www.vaers. hhs.gov or call 2-946.417.9600. VAERS is only for reporting reactions, and VAERS staff do not give medical advice. 6. The National Vaccine Injury Compensation Program 
 
The [facility] Vaccine Injury Compensation Program (VICP) is a federal program that was created to compensate people who may have been injured by certain vaccines. Visit the VICP website at www.[facility].gov/vaccinecompensation or call 5-917.383.1806 to learn about the program and about filing a claim. There is a time limit to file a claim for compensation. 7. How can I learn more?  Ask your health care provider.  Call your local or state health department.  Contact the Centers for Disease Control and Prevention (CDC): 
- Call 4-285.370.7347 (1-800-CDC-INFO) or 
- Visit CDCs influenza website at www.cdc.gov/flu Vaccine Information Statement (Interim) Inactivated Influenza Vaccine 8/15/2019 
42 THERON Bruno 273HK-10 Department of Health and [employer] Centers for Disease Control and Prevention Office Use Only

## 2020-10-01 RX ORDER — ROSUVASTATIN CALCIUM 40 MG/1
40 TABLET, COATED ORAL
Qty: 90 TAB | Refills: 3
Start: 2020-10-01 | End: 2021-01-22

## 2021-01-22 RX ORDER — ROSUVASTATIN CALCIUM 40 MG/1
TABLET, COATED ORAL
Qty: 90 TAB | Refills: 3 | Status: SHIPPED | OUTPATIENT
Start: 2021-01-22 | End: 2021-03-17 | Stop reason: SDUPTHER

## 2021-02-10 RX ORDER — RIVAROXABAN 20 MG/1
TABLET, FILM COATED ORAL
Qty: 90 TAB | Refills: 1 | Status: SHIPPED | OUTPATIENT
Start: 2021-02-10 | End: 2021-04-27

## 2021-02-10 RX ORDER — CYCLOBENZAPRINE HCL 5 MG
TABLET ORAL
Qty: 90 TAB | Refills: 1 | Status: SHIPPED | OUTPATIENT
Start: 2021-02-10 | End: 2021-04-27

## 2021-03-18 RX ORDER — ROSUVASTATIN CALCIUM 40 MG/1
TABLET, COATED ORAL
Qty: 90 TAB | Refills: 3 | Status: SHIPPED | OUTPATIENT
Start: 2021-03-18

## 2021-03-18 RX ORDER — EZETIMIBE 10 MG/1
10 TABLET ORAL DAILY
Qty: 90 TAB | Refills: 3 | Status: SHIPPED | OUTPATIENT
Start: 2021-03-18 | End: 2021-05-05 | Stop reason: SDUPTHER

## 2021-03-30 NOTE — TELEPHONE ENCOUNTER
She is changing [facility] from [facility] to [address] She needs her medications to [facility].        Callback required yes/no and why: Yes when completed       Best contact number(s):[phone]     LORI

## 2021-04-02 RX ORDER — EZETIMIBE 10 MG/1
10 TABLET ORAL DAILY
Qty: 90 TAB | Refills: 3 | OUTPATIENT
Start: 2021-04-02

## 2021-04-02 NOTE — LETTER
4/6/2021 Troy Strong [address] [address] Dear Troy Strong My records indicate that you are overdue for a follow up appointment. It is important to maintain your appointments so that I can monitor your health. Please call our office at [phone] to schedule an appointment. Refused Prescriptions 
 
 ezetimibe (ZETIA) 10 mg tablet Sig: Take 1 Tab by mouth daily. Disp:  90 Tab    Refills:  3 Start: 4/2/2021 Class: Normal  
 Refused by: Sherin Casillas MD  
  
 
 
 
Sincerely, 
Sherin Casillas MD 
[facility] [address] 
[address] [facility] 
[phone]

## 2021-04-08 NOTE — TELEPHONE ENCOUNTER
medications       Callback required yes/no and why: yes.  To discuss       Best contact number(s):[phone]       Details to clarify the request: pt stated that she would like the nurse to call her to discuss her medication     ENVERA

## 2021-04-11 RX ORDER — EZETIMIBE 10 MG/1
10 TABLET ORAL DAILY
Qty: 90 TAB | Refills: 3 | OUTPATIENT
Start: 2021-04-11

## 2021-04-11 RX ORDER — VALSARTAN 40 MG/1
TABLET ORAL DAILY
OUTPATIENT
Start: 2021-04-11

## 2021-04-11 RX ORDER — ROSUVASTATIN CALCIUM 40 MG/1
TABLET, COATED ORAL
Qty: 90 TAB | Refills: 3 | OUTPATIENT
Start: 2021-04-11

## 2021-04-27 ENCOUNTER — OFFICE VISIT (OUTPATIENT)
Dept: INTERNAL MEDICINE CLINIC | Age: 86
End: 2021-04-27
Payer: MEDICARE

## 2021-04-27 VITALS
HEART RATE: 70 BPM | TEMPERATURE: 98.2 F | HEIGHT: 65 IN | DIASTOLIC BLOOD PRESSURE: 68 MMHG | SYSTOLIC BLOOD PRESSURE: 136 MMHG | OXYGEN SATURATION: 97 % | WEIGHT: 165 LBS | RESPIRATION RATE: 14 BRPM | BODY MASS INDEX: 27.49 KG/M2

## 2021-04-27 DIAGNOSIS — E05.90 HYPERTHYROIDISM: Primary | ICD-10-CM

## 2021-04-27 DIAGNOSIS — Z86.718 HISTORY OF DEEP VEIN THROMBOSIS (DVT) OF LOWER EXTREMITY: ICD-10-CM

## 2021-04-27 DIAGNOSIS — E78.5 HYPERLIPIDEMIA WITH TARGET LDL LESS THAN 70: ICD-10-CM

## 2021-04-27 DIAGNOSIS — M53.86 SCIATICA ASSOCIATED WITH DISORDER OF LUMBAR SPINE: ICD-10-CM

## 2021-04-27 DIAGNOSIS — I10 ESSENTIAL HYPERTENSION: ICD-10-CM

## 2021-04-27 PROCEDURE — G8536 NO DOC ELDER MAL SCRN: HCPCS | Performed by: INTERNAL MEDICINE

## 2021-04-27 PROCEDURE — G8419 CALC BMI OUT NRM PARAM NOF/U: HCPCS | Performed by: INTERNAL MEDICINE

## 2021-04-27 PROCEDURE — 3288F FALL RISK ASSESSMENT DOCD: CPT | Performed by: INTERNAL MEDICINE

## 2021-04-27 PROCEDURE — G8399 PT W/DXA RESULTS DOCUMENT: HCPCS | Performed by: INTERNAL MEDICINE

## 2021-04-27 PROCEDURE — G8754 DIAS BP LESS 90: HCPCS | Performed by: INTERNAL MEDICINE

## 2021-04-27 PROCEDURE — G8427 DOCREV CUR MEDS BY ELIG CLIN: HCPCS | Performed by: INTERNAL MEDICINE

## 2021-04-27 PROCEDURE — 1090F PRES/ABSN URINE INCON ASSESS: CPT | Performed by: INTERNAL MEDICINE

## 2021-04-27 PROCEDURE — G8510 SCR DEP NEG, NO PLAN REQD: HCPCS | Performed by: INTERNAL MEDICINE

## 2021-04-27 PROCEDURE — 99214 OFFICE O/P EST MOD 30 MIN: CPT | Performed by: INTERNAL MEDICINE

## 2021-04-27 PROCEDURE — G8752 SYS BP LESS 140: HCPCS | Performed by: INTERNAL MEDICINE

## 2021-04-27 PROCEDURE — 1100F PTFALLS ASSESS-DOCD GE2>/YR: CPT | Performed by: INTERNAL MEDICINE

## 2021-04-27 PROCEDURE — G0463 HOSPITAL OUTPT CLINIC VISIT: HCPCS | Performed by: INTERNAL MEDICINE

## 2021-04-27 RX ORDER — ISOSORBIDE MONONITRATE 60 MG/1
TABLET, EXTENDED RELEASE ORAL
COMMUNITY
Start: 2021-02-09

## 2021-04-27 RX ORDER — CYCLOBENZAPRINE HCL 5 MG
5 TABLET ORAL
Qty: 50 TAB | Refills: 1 | Status: SHIPPED | OUTPATIENT
Start: 2021-04-27 | End: 2021-09-21 | Stop reason: SDUPTHER

## 2021-04-27 NOTE — PROGRESS NOTES
Andrew Rosales is a 80 y.o. female    Chief Complaint   Patient presents with    Follow-up     6 mo       Health Maintenance Due   Topic Date Due    DTaP/Tdap/Td series (1 - Tdap) Never done    Shingrix Vaccine Age 50> (1 of 2) Never done    Medicare Yearly Exam  02/06/2021    Lipid Screen  02/06/2021       Visit Vitals  /68 (BP 1 Location: Left upper arm, BP Patient Position: Sitting)   Pulse 70   Temp 98.2 °F (36.8 °C) (Temporal)   Resp 14   Ht 5' 5\" (1.651 m)   Wt 165 lb (74.8 kg)   SpO2 97%   BMI 27.46 kg/m²       3 most recent PHQ Screens 4/27/2021   PHQ Not Done -   Little interest or pleasure in doing things Not at all   Feeling down, depressed, irritable, or hopeless Not at all   Total Score PHQ 2 0       Fall Risk Assessment, last 12 mths 4/27/2021   Able to walk? Yes   Fall in past 12 months? 0   Do you feel unsteady? 0   Are you worried about falling 0   Number of falls in past 12 months -   Fall with injury? -       Abuse Screening Questionnaire 4/27/2021   Do you ever feel afraid of your partner? N   Are you in a relationship with someone who physically or mentally threatens you? N   Is it safe for you to go home? Y         1. Have you been to the ER, urgent care clinic since your last visit? Hospitalized since your last visit?no    2. Have you seen or consulted any other health care providers outside of the [address] [place] [address] since your last visit? Include any pap smears or colon screening.  Yes endocrinologist and cardiologist

## 2021-04-27 NOTE — PROGRESS NOTES
Julien Severe is a 80 y.o. female with the following Problems and Medications. Patient Active Problem List   Diagnosis Code    Hyperlipidemia with target LDL less than 70 E78.5    Coronary artery disease due to calcified coronary lesion I25.10, I25.84    Hyperthyroidism E05.90    HTN (hypertension) I10    Osteopenia M85.80    Coronary artery disease involving autologous artery coronary bypass graft without angina pectoris I25.810    Living will, counseling/discussion Z71.89    Calculus of gallbladder and bile duct without cholecystitis K80.70    History of pulmonary embolism Z86.711    Coronary artery disease with stable angina pectoris (HCC) I25.118    Peripheral vascular disease (HCC) I73.9     Current Outpatient Medications   Medication Sig Dispense Refill    rivaroxaban (Xarelto) 10 mg tablet Take 1 Tab by mouth daily (with breakfast). 90 Tab 1    cyclobenzaprine (FLEXERIL) 5 mg tablet Take 1 Tab by mouth three (3) times daily as needed for Muscle Spasm(s). 50 Tab 1    ezetimibe (ZETIA) 10 mg tablet Take 1 Tab by mouth daily. 90 Tab 3    rosuvastatin (CRESTOR) 40 mg tablet TAKE 1 TABLET BY MOUTH EVERY DAY AT NIGHT 90 Tab 3    bumetanide (BUMEX) 1 mg tablet TAKE 1 TABLET BY MOUTH EVERY DAY      valsartan (DIOVAN) 40 mg tablet Take  by mouth daily.  estradioL (Yuvafem) 10 mcg tab vaginal tablet USE 1 TABLET VAGINALLY ON TUESDAYS AND FRIDAYS 24 Tab 3    nitroglycerin (NITROSTAT) 0.4 mg SL tablet DISSOLVE 1 TABLET UNDER TONGUE EVERY 5 MINUTES AS NEEDED FOR CHEST PAIN 25 Tab 3    isosorbide mononitrate ER (IMDUR) 30 mg tablet 60 mg.  2    methIMAzole (TAPAZOLE) 10 mg tablet Take 5 mg by mouth daily. Alternate between 5 mg and 10 mg daily      aspirin delayed-release 81 mg tablet Take 81 mg by mouth daily.  docusate sodium (COLACE) 100 mg capsule Take 50 mg by mouth two (2) times a day.       isosorbide mononitrate ER (IMDUR) 60 mg CR tablet TAKE 1 TABLET BY MOUTH EVERY DAY IN THE MORNING      lisinopril (PRINIVIL, ZESTRIL) 10 mg tablet Take 1 Tab by mouth daily. 90 Tab 1    acetaminophen (TYLENOL EXTRA STRENGTH) 500 mg tablet Take 2 Tabs by mouth.  acetaminophen (TylenoL) 325 mg tablet Take 650 mg by mouth every four (4) hours as needed for Pain. Two and a half years ago, she had a left knee replacement. Had a postoperative DVT, was placed on Xarelto. She has actually stayed on it throughout this entire time period and has not gotten herself off of it. She is on 20 mg a day. She noticed that her skin mottles at times. She notices that she bruises more easily. She also wanted to talk about the Flexeril, she has got a prescription for 5 mg one at bedtime. She has no leg spasms and no tightness. She previously used Flexeril only when she would have some sciatic low back spasms. Her vitals were stable. Her long-term conditions were reviewed. I told her that her DVT/PE that she had two years ago was precipitated by knee replacement. She could come off of Xarelto. She says she is reluctant to do so and wants to stay on an anticoagulant for the time being. She has got a history of coronary disease, has a history of angina. I told her we could lower this Xarelto dose to 10 mg a day and see how she does and then we could consider taking her off of it. I went ahead and refilled the Flexeril, but I made it just a p.r.n. prescription, not a standard dose and just p.r.n. for low back spasms or sciatic pain. Vitals:    04/27/21 1109   BP: 136/68   Pulse: 70   Resp: 14   Temp: 98.2 °F (36.8 °C)   TempSrc: Temporal   SpO2: 97%   Weight: 165 lb (74.8 kg)   Height: 5' 5\" (1.651 m)     1. Hyperthyroidism  Labs seeing endo  - TSH 3RD GENERATION; Future  - TSH 3RD GENERATION    2. Essential hypertension  controlled  - CBC WITH AUTOMATED DIFF; Future  - LIPID PANEL; Future  - METABOLIC PANEL, COMPREHENSIVE;  Future  - CBC WITH AUTOMATED DIFF  - LIPID PANEL  - METABOLIC PANEL, COMPREHENSIVE    3. Hyperlipidemia with target LDL less than 70  On zetia plus high dose statin  - CBC WITH AUTOMATED DIFF; Future  - LIPID PANEL; Future  - METABOLIC PANEL, COMPREHENSIVE; Future  - CBC WITH AUTOMATED DIFF  - LIPID PANEL  - METABOLIC PANEL, COMPREHENSIVE    4. History of deep vein thrombosis (DVT) of lower extremity  beluieve she can come [place]  - rivaroxaban (Xarelto) 10 mg tablet; Take 1 Tab by mouth daily (with breakfast). Dispense: 90 Tab; Refill: 1    5. Sciatica associated with disorder of lumbar spine  refill  - cyclobenzaprine (FLEXERIL) 5 mg tablet; Take 1 Tab by mouth three (3) times daily as needed for Muscle Spasm(s). Dispense: 50 Tab;  Refill: 1  High risk medications reviewed  Labs to be reviewed    Angina is stable slows down her activity

## 2021-05-07 RX ORDER — EZETIMIBE 10 MG/1
10 TABLET ORAL DAILY
Qty: 90 TAB | Refills: 3 | Status: SHIPPED | OUTPATIENT
Start: 2021-05-07 | End: 2022-07-06

## 2021-05-14 RX ORDER — ESTRADIOL 10 UG/1
INSERT VAGINAL
Qty: 24 TAB | Refills: 3 | Status: SHIPPED | OUTPATIENT
Start: 2021-05-14 | End: 2022-06-05

## 2021-09-21 DIAGNOSIS — M53.86 SCIATICA ASSOCIATED WITH DISORDER OF LUMBAR SPINE: ICD-10-CM

## 2021-09-21 NOTE — TELEPHONE ENCOUNTER
Future Appointments:  No future appointments. Last Appointment With Me:  4/27/2021     Requested Prescriptions     Pending Prescriptions Disp Refills    cyclobenzaprine (FLEXERIL) 5 mg tablet 50 Tablet 1     Sig: Take 1 Tablet by mouth three (3) times daily as needed for Muscle Spasm(s).

## 2021-09-22 RX ORDER — CYCLOBENZAPRINE HCL 5 MG
5 TABLET ORAL
Qty: 50 TABLET | Refills: 1 | Status: SHIPPED | OUTPATIENT
Start: 2021-09-22

## 2021-11-29 ENCOUNTER — TELEPHONE (OUTPATIENT)
Dept: INTERNAL MEDICINE CLINIC | Age: 86
End: 2021-11-29

## 2021-11-29 NOTE — TELEPHONE ENCOUNTER
----- Message from Tyron Boyd sent at 11/29/2021  3:04 PM EST -----  Subject: Message to Provider    QUESTIONS  Information for Provider? Patient is calling because she loss her handicap   sticker and she would like to know if she could get an order for a new   one. She stated the year either 2027 or 2028. Please advise  ---------------------------------------------------------------------------  --------------  CALL BACK INFO  What is the best way for the office to contact you? OK to leave message on   voicemail  Preferred Call Back Phone Number? [phone]  ---------------------------------------------------------------------------  --------------  SCRIPT ANSWERS  Relationship to Patient?  Self

## 2021-12-02 DIAGNOSIS — Z86.718 HISTORY OF DEEP VEIN THROMBOSIS (DVT) OF LOWER EXTREMITY: ICD-10-CM

## 2021-12-03 RX ORDER — RIVAROXABAN 10 MG/1
TABLET, FILM COATED ORAL
Qty: 90 TABLET | Refills: 3 | Status: SHIPPED | OUTPATIENT
Start: 2021-12-03

## 2022-01-27 ENCOUNTER — OFFICE VISIT (OUTPATIENT)
Dept: INTERNAL MEDICINE CLINIC | Age: 87
End: 2022-01-27
Payer: MEDICARE

## 2022-01-27 VITALS
TEMPERATURE: 98.4 F | HEART RATE: 83 BPM | SYSTOLIC BLOOD PRESSURE: 121 MMHG | BODY MASS INDEX: 28.16 KG/M2 | WEIGHT: 169 LBS | RESPIRATION RATE: 20 BRPM | DIASTOLIC BLOOD PRESSURE: 67 MMHG | HEIGHT: 65 IN

## 2022-01-27 DIAGNOSIS — I25.118 CORONARY ARTERY DISEASE OF NATIVE ARTERY OF NATIVE HEART WITH STABLE ANGINA PECTORIS (HCC): ICD-10-CM

## 2022-01-27 DIAGNOSIS — M35.3 PMR (POLYMYALGIA RHEUMATICA) (HCC): ICD-10-CM

## 2022-01-27 DIAGNOSIS — I73.9 PERIPHERAL VASCULAR DISEASE (HCC): ICD-10-CM

## 2022-01-27 DIAGNOSIS — R10.12 LEFT UPPER QUADRANT ABDOMINAL PAIN: Primary | ICD-10-CM

## 2022-01-27 DIAGNOSIS — R07.81 RIB PAIN ON LEFT SIDE: ICD-10-CM

## 2022-01-27 PROCEDURE — G8536 NO DOC ELDER MAL SCRN: HCPCS | Performed by: INTERNAL MEDICINE

## 2022-01-27 PROCEDURE — 1090F PRES/ABSN URINE INCON ASSESS: CPT | Performed by: INTERNAL MEDICINE

## 2022-01-27 PROCEDURE — 99213 OFFICE O/P EST LOW 20 MIN: CPT | Performed by: INTERNAL MEDICINE

## 2022-01-27 PROCEDURE — G8510 SCR DEP NEG, NO PLAN REQD: HCPCS | Performed by: INTERNAL MEDICINE

## 2022-01-27 PROCEDURE — G8419 CALC BMI OUT NRM PARAM NOF/U: HCPCS | Performed by: INTERNAL MEDICINE

## 2022-01-27 PROCEDURE — G0463 HOSPITAL OUTPT CLINIC VISIT: HCPCS | Performed by: INTERNAL MEDICINE

## 2022-01-27 PROCEDURE — 1101F PT FALLS ASSESS-DOCD LE1/YR: CPT | Performed by: INTERNAL MEDICINE

## 2022-01-27 PROCEDURE — G8427 DOCREV CUR MEDS BY ELIG CLIN: HCPCS | Performed by: INTERNAL MEDICINE

## 2022-01-27 NOTE — PROGRESS NOTES
Chief Complaint   Patient presents with    Abdominal Pain     1. Have you been to the ER, urgent care clinic since your last visit? Hospitalized since your last visit? No    2. Have you seen or consulted any other health care providers outside of the [address] [place] [address] since your last visit? Include any pap smears or colon screening.  No     Visit Vitals  /67   Pulse 83   Temp 98.4 °F (36.9 °C) (Temporal)   Resp 20   Ht 5' 5\" (1.651 m)   Wt 169 lb (76.7 kg)   BMI 28.12 kg/m²

## 2022-01-27 NOTE — PROGRESS NOTES
This 80 y.o. female has pain in her right lateral rib cage and now left lateral rib cage. The only thing that is different is she was doing some twisting exercises a week ago. The pain started two or three days ago in the right lateral rib worse with bending and twisting. No nausea, vomiting, diarrhea. She is chronically very constipated. Has a bowel movement every two to four weeks. Takes Dulcolax when she needs to. Her pain now is more on the left side and is more left upper quadrant, left lateral.  Once again, it does not seem to be better or worse with eating and position definitely makes it worse. When she lies in certain position, she is uncomfortable. She has had no breathing difficulty. No angina. No chest pain. Her meds were reviewed. Her BP was normal.  Afebrile. Nonicteric. Abdominal exam was normal.  Active bowel sounds. Tenderness right lateral rib cage. Tenderness left lateral rib cage. No rash. No flank pain. Chest clear. Etiology of this is not clear, but it seems to be musculoskeletal in nature. I told her to take a 1000 mg of Tylenol three times daily. To rest.  Do not do any heavy exercise. I ordered a CBC, chemistry, amylase and lipase. She will come back and see me in a week or two or sooner or if it gets worse in the emergency room. Patient Active Problem List    Diagnosis    History of deep vein thrombosis (DVT) of lower extremity    [facility]    Coronary artery disease with stable angina pectoris ([facility]    History of pulmonary embolism    Calculus of gallbladder and bile duct without cholecystitis    Living will, counseling/discussion    Coronary artery disease involving autologous artery coronary bypass graft without angina pectoris     Stent 7/ 2015      Osteopenia     2010 hip -0.7.  Hyperthyroidism     Followed by Dr Trevizo Overall.       HTN (hypertension)    Hyperlipidemia with target LDL less than 70    Coronary artery disease due to calcified coronary lesion     2000. CABG. Followed by Dr Dayton vela. 2013 Nuclear stress test .  2015 PTCA UZAIR Ramus saphenous graft. [email or web address] Vitals:    01/27/22 1109   BP: 121/67   Pulse: 83   Resp: 20   Temp: 98.4 °F (36.9 °C)   TempSrc: Temporal   Weight: 169 lb (76.7 kg)   Height: 5' 5\" (1.651 m)     Diagnoses and all orders for this visit:    1. Left upper quadrant abdominal pain  -     LIPASE; Future  -     AMYLASE; Future  -     METABOLIC PANEL, COMPREHENSIVE; Future  -     CBC WITH AUTOMATED DIFF; Future    2. PMR (polymyalgia rheumatica) (HCC)    3. Peripheral vascular disease ([facility]    4. Coronary artery disease of native artery of native heart with stable angina pectoris ([facility]    5.  Rib pain on left side      Use Tylenol for pain max of 3000 mg daily

## 2022-01-29 LAB
CHOLEST SERPL-MCNC: 140 MG/DL (ref 100–199)
HDLC SERPL-MCNC: 52 MG/DL
IMP & REVIEW OF LAB RESULTS: NORMAL
LDLC SERPL CALC-MCNC: 71 MG/DL (ref 0–99)
TRIGL SERPL-MCNC: 88 MG/DL (ref 0–149)
TSH SERPL-ACNC: 0.33 UIU/ML (ref 0.45–4.5)
VLDLC SERPL CALC-MCNC: 17 MG/DL (ref 5–40)

## 2022-02-01 ENCOUNTER — TELEPHONE (OUTPATIENT)
Dept: INTERNAL MEDICINE CLINIC | Age: 87
End: 2022-02-01

## 2022-02-01 NOTE — TELEPHONE ENCOUNTER
----- Message from Fernanda Haskins sent at 2/1/2022 11:48 AM EST -----  Subject: Message to Provider    QUESTIONS  Information for Provider? Pt would like to speak to the nurse about her   lab results she has not received them yet. She states if she doesn't you   can leave the results on her answering machine.  ---------------------------------------------------------------------------  --------------  CALL BACK INFO  What is the best way for the office to contact you? OK to leave message on   voicemail  Preferred Call Back Phone Number? [phone]  ---------------------------------------------------------------------------  --------------  SCRIPT ANSWERS  Relationship to Patient?  Self

## 2022-02-03 ENCOUNTER — TELEPHONE (OUTPATIENT)
Dept: INTERNAL MEDICINE CLINIC | Age: 87
End: 2022-02-03

## 2022-03-18 PROBLEM — Z86.711 HISTORY OF PULMONARY EMBOLISM: Status: ACTIVE | Noted: 2019-04-25

## 2022-03-19 PROBLEM — I73.9 PERIPHERAL VASCULAR DISEASE (HCC): Status: ACTIVE | Noted: 2020-02-06

## 2022-03-19 PROBLEM — K80.70 CALCULUS OF GALLBLADDER AND BILE DUCT WITHOUT CHOLECYSTITIS: Status: ACTIVE | Noted: 2018-09-05

## 2022-03-19 PROBLEM — Z86.718 HISTORY OF DEEP VEIN THROMBOSIS (DVT) OF LOWER EXTREMITY: Status: ACTIVE | Noted: 2021-04-27

## 2022-03-20 PROBLEM — Z71.89 LIVING WILL, COUNSELING/DISCUSSION: Status: ACTIVE | Noted: 2017-07-12

## 2022-03-20 PROBLEM — I25.118 CORONARY ARTERY DISEASE WITH STABLE ANGINA PECTORIS (HCC): Status: ACTIVE | Noted: 2019-05-22

## 2022-06-05 RX ORDER — ESTRADIOL 10 UG/1
INSERT VAGINAL
Qty: 24 TABLET | Refills: 3 | Status: SHIPPED | OUTPATIENT
Start: 2022-06-05

## 2022-06-20 ENCOUNTER — OFFICE VISIT (OUTPATIENT)
Dept: INTERNAL MEDICINE CLINIC | Age: 87
End: 2022-06-20
Payer: MEDICARE

## 2022-06-20 VITALS
TEMPERATURE: 98.2 F | DIASTOLIC BLOOD PRESSURE: 57 MMHG | HEIGHT: 65 IN | BODY MASS INDEX: 28.39 KG/M2 | SYSTOLIC BLOOD PRESSURE: 127 MMHG | HEART RATE: 58 BPM | WEIGHT: 170.4 LBS | RESPIRATION RATE: 16 BRPM | OXYGEN SATURATION: 96 %

## 2022-06-20 DIAGNOSIS — Z23 ENCOUNTER FOR IMMUNIZATION: Primary | ICD-10-CM

## 2022-06-20 DIAGNOSIS — Z00.00 ADULT GENERAL MEDICAL EXAMINATION: ICD-10-CM

## 2022-06-20 DIAGNOSIS — H90.6 MIXED CONDUCTIVE AND SENSORINEURAL HEARING LOSS OF BOTH EARS: ICD-10-CM

## 2022-06-20 DIAGNOSIS — Z86.16 HISTORY OF COVID-19: ICD-10-CM

## 2022-06-20 PROCEDURE — G8427 DOCREV CUR MEDS BY ELIG CLIN: HCPCS | Performed by: INTERNAL MEDICINE

## 2022-06-20 PROCEDURE — 90732 PPSV23 VACC 2 YRS+ SUBQ/IM: CPT | Performed by: INTERNAL MEDICINE

## 2022-06-20 PROCEDURE — 1090F PRES/ABSN URINE INCON ASSESS: CPT | Performed by: INTERNAL MEDICINE

## 2022-06-20 PROCEDURE — G0463 HOSPITAL OUTPT CLINIC VISIT: HCPCS | Performed by: INTERNAL MEDICINE

## 2022-06-20 PROCEDURE — G8536 NO DOC ELDER MAL SCRN: HCPCS | Performed by: INTERNAL MEDICINE

## 2022-06-20 PROCEDURE — G8510 SCR DEP NEG, NO PLAN REQD: HCPCS | Performed by: INTERNAL MEDICINE

## 2022-06-20 PROCEDURE — G0439 PPPS, SUBSEQ VISIT: HCPCS | Performed by: INTERNAL MEDICINE

## 2022-06-20 PROCEDURE — G8417 CALC BMI ABV UP PARAM F/U: HCPCS | Performed by: INTERNAL MEDICINE

## 2022-06-20 PROCEDURE — 1101F PT FALLS ASSESS-DOCD LE1/YR: CPT | Performed by: INTERNAL MEDICINE

## 2022-06-20 PROCEDURE — 99213 OFFICE O/P EST LOW 20 MIN: CPT | Performed by: INTERNAL MEDICINE

## 2022-06-20 NOTE — PROGRESS NOTES
Reviewed record in preparation for visit and have obtained necessary documentation. Identified pt with two pt identifiers(name and ). Chief Complaint   Patient presents with   [address] Visit     There were no vitals filed for this visit. Health Maintenance Due   Topic Date Due    DTaP/Tdap/Td  (1 - Tdap) Never done    Shingles Vaccine (1 of 2) Never done    Annual Well Visit  2021    COVID-19 Vaccine (2 - Aly risk 3-dose series) 2021       Ms. Adia Swann has a reminder for a \"due or due soon\" health maintenance. I have asked that she discuss this further with her primary care provider for follow-up on this health maintenance. Coordination of Care Questionnaire:  :     1) Have you been to an emergency room, urgent care clinic since your last visit? No    Hospitalized since your last visit?no    2) Have you seen or consulted any other health care providers outside of [address] [place] [address] since your last visit? Yes    (Include any pap smears or colon screenings in this section.)    3. For patients aged 39-70: Has the patient had a colonoscopy / FIT/ Cologuard? No        If the patient is female:  4. For patients aged 41-77: Has the patient had a mammogram within the past 2 years? no       5. For patients aged 21-65: Has the patient had a pap smear?n/a       In the event something were to happen to you and you were unable to speak on your behalf, do you have an Advance Directive/ Living Will in place stating your wishes? Yes      Do you have an Advance Directive on file? Yes      6) Are you interested in receiving information on Advance Directives? No      Patient is accompanied by self I have received verbal consent from [facility] to discuss any/all medical information while they are present in the room.     Patient has been informed of any other discussion outside the parameters of the physical could result in other charges including a co pay, patient verbalized understanding.

## 2022-06-20 NOTE — PATIENT INSTRUCTIONS

## 2022-06-20 NOTE — PROGRESS NOTES
Chief Complaint   Patient presents with    Annual Wellness Visit       This is the Subsequent Medicare Annual Wellness Exam, performed 12 months or more after the Initial AWV or the last Subsequent AWV    I have reviewed the patient's medical history in detail and updated the computerized patient record. Assessment/Plan   Education and counseling provided:  Are appropriate based on today's review and evaluation  End-of-Life planning (with patient's consent)  Pneumococcal Vaccine    1. Encounter for immunization  -     PNEUMOCOCCAL, PPSV23, (AGE 2 YRS+), SC/IM  -     ADMIN PNEUMOCOCCAL VACCINE       Depression Risk Factor Screening     3 most recent PHQ Screens 6/20/2022   PHQ Not Done -   Little interest or pleasure in doing things Not at all   Feeling down, depressed, irritable, or hopeless Not at all   Total Score PHQ 2 0   Trouble falling or staying asleep, or sleeping too much Not at all   Feeling tired or having little energy Not at all   Poor appetite, weight loss, or overeating Not at all   Feeling bad about yourself - or that you are a failure or have let yourself or your family down Not at all   Trouble concentrating on things such as school, work, reading, or watching TV Not at all   Moving or speaking so slowly that other people could have noticed; or the opposite being so fidgety that others notice Not at all   Thoughts of being better off dead, or hurting yourself in some way Not at all   PHQ 9 Score 0   How difficult have these problems made it for you to do your work, take care of your home and get along with others Not difficult at all       Alcohol & Drug Abuse Risk Screen    Do you average more than 1 drink per night or more than 7 drinks a week:  No    On any one occasion in the past three months have you have had more than 3 drinks containing alcohol:  No          Functional Ability and Level of Safety    Hearing: The patient wears hearing aids. Activities of Daily Living:   The home contains: no safety equipment. Patient does total self care      Ambulation: with no difficulty     Fall Risk:  Fall Risk Assessment, last 12 mths 6/20/2022   Able to walk? Yes   Fall in past 12 months? 0   Do you feel unsteady? 0   Are you worried about falling 0   Number of falls in past 12 months -   Fall with injury?  -      Abuse Screen:  Patient is not abused       Cognitive Screening    Has your family/caregiver stated any concerns about your memory: no     Cognitive Screening: Normal - Verbal Fluency Test    Health Maintenance Due     Health Maintenance Due   Topic Date Due    DTaP/Tdap/Td series (1 - Tdap) Never done    Shingrix Vaccine Age 50> (1 of 2) Never done    COVID-19 Vaccine (3 - Aly risk 3-dose series) 08/04/2021       Patient Care Team   Patient Care Team:  Wally Latham MD as PCP - General (Internal Medicine Physician)  Wally Latham MD as PCP - [facility]  Aldo Baez MD (Cardiovascular Disease Physician)  Erasto Tse MD (Internal Medicine Physician)  Jemima Jimenez DO (Orthopedic Surgery)    History     Patient Active Problem List   Diagnosis Code    Hyperlipidemia with target LDL less than 70 E78.5    Coronary artery disease due to calcified coronary lesion I25.10, I25.84    Hyperthyroidism E05.90    HTN (hypertension) I10    Osteopenia M85.80    Coronary artery disease involving autologous artery coronary bypass graft without angina pectoris I25.810    Living will, counseling/discussion Z71.89    Calculus of gallbladder and bile duct without cholecystitis K80.70    History of pulmonary embolism Z86.711    Coronary artery disease with stable angina pectoris (HCC) I25.118    Peripheral vascular disease ([facility] [address] I73.9    History of deep vein thrombosis (DVT) of lower extremity Z86.718     Past Medical History:   Diagnosis Date    CAD (coronary artery disease)     s/p cabg, dr Hope Kirby 2000 x4 vessel disease    Hypercholesterolemia  Hypertension     Hyperthyroidism     1990's, dr Rod Murray    Osteoporosis       Past Surgical History:   Procedure Laterality Date    HX BLADDER SUSPENSION      dr werner bolden    HX COLONOSCOPY  2011    HX HYSTERECTOMY  2003    HX BRIAN AND BSO      2002    HX TRACHEOSTOMY      whooping cough age 3    [address]      tkr, right; 2008    VA BIOPSY OF BREAST, INCISIONAL  1985    LEFT breast    VA CABG, ARTERY-VEIN, FOUR  2000    heart surgery at [facility]     Current Outpatient Medications   Medication Sig Dispense Refill    estradioL (Yuvafem) 10 mcg tab vaginal tablet USE 1 TABLET VAGINALLY ON TUESDAYS AND FRIDAYS 24 Tablet 3    Xarelto 10 mg tablet TAKE 1 TABLET DAILY WITH BREAKFAST 90 Tablet 3    ranolazine ER (RANEXA) 500 mg SR tablet Take 500 mg by mouth daily.  methIMAzole (TAPAZOLE) 5 mg tablet       cyclobenzaprine (FLEXERIL) 5 mg tablet Take 1 Tablet by mouth three (3) times daily as needed for Muscle Spasm(s). 50 Tablet 1    ezetimibe (ZETIA) 10 mg tablet Take 1 Tab by mouth daily. 90 Tab 3    isosorbide mononitrate ER (IMDUR) 60 mg CR tablet TAKE 1 TABLET BY MOUTH EVERY DAY IN THE MORNING      rosuvastatin (CRESTOR) 40 mg tablet TAKE 1 TABLET BY MOUTH EVERY DAY AT NIGHT 90 Tab 3    bumetanide (BUMEX) 1 mg tablet TAKE 1 TABLET BY MOUTH EVERY DAY      valsartan (DIOVAN) 40 mg tablet Take  by mouth daily.  nitroglycerin (NITROSTAT) 0.4 mg SL tablet DISSOLVE 1 TABLET UNDER TONGUE EVERY 5 MINUTES AS NEEDED FOR CHEST PAIN 25 Tab 3    lisinopril (PRINIVIL, ZESTRIL) 10 mg tablet Take 1 Tab by mouth daily. 90 Tab 1    acetaminophen (TYLENOL EXTRA STRENGTH) 500 mg tablet Take 2 Tablets by mouth every six (6) hours as needed.  methIMAzole (TAPAZOLE) 10 mg tablet Take 5 mg by mouth daily. Alternate between 5 mg and 10 mg daily      aspirin delayed-release 81 mg tablet Take 81 mg by mouth daily.        No Known Allergies    Family History   Problem Relation Age of Onset    Cancer Mother 48        breast     Stroke Mother     Cancer Father         lung    Lung Disease Father     Heart Disease Sister      Social History     Tobacco Use    Smoking status: Never Smoker    Smokeless tobacco: Never Used   Substance Use Topics    Alcohol use: No     She had acute COVID about three months ago. She has had two vaccines and I called in [facility] and she did well with it. She tells me she is fine. Her symptoms are totally resolved. She is planning on not getting any more COVID vaccines. I told her that is probably a mistake and she should consider doing some. She has had no consequences of the COVID-19. Specifically she has had no shortness of breath or cough. She has had no chronic headaches and no loss of taste or loss of smell. Her BP is stable. She had lab work done by her endocrinologist and I have reviewed some of the results. She seems to be doing okay with it. I suggested continuing COVID booster, she is resistant. I suggested pneumococcal vaccine today and it looks she has only had a Prevnar, but no Pneumovax 23 and she was in favor of that. So we went ahead and did it. We talked about shingles vaccines, she had the old shingles vaccine, over 10 or 11 years ago that was the active viral vaccine, not inactivated. She has never had Shingrix. She has decided against Shingrix because of cost.  I told her that is her decision and she could change her mind at any time.     Vitals:    06/20/22 1456   BP: (!) 127/57   Pulse: (!) 58   Resp: 16   Temp: 98.2 °F (36.8 °C)   TempSrc: Temporal   SpO2: 96%   Weight: 170 lb 6.4 oz (77.3 kg)   Height: 5' 5\" (1.651 m)     Ears clean  Per request  She was ok    Angelo Schmitt MD

## 2022-07-06 RX ORDER — EZETIMIBE 10 MG/1
TABLET ORAL
Qty: 90 TABLET | Refills: 3 | Status: SHIPPED | OUTPATIENT
Start: 2022-07-06

## 2022-08-29 DIAGNOSIS — H90.6 MIXED CONDUCTIVE AND SENSORINEURAL HEARING LOSS OF BOTH EARS: Primary | ICD-10-CM

## 2022-10-22 ENCOUNTER — TELEPHONE (OUTPATIENT)
Dept: INTERNAL MEDICINE CLINIC | Age: 87
End: 2022-10-22

## 2022-11-17 ENCOUNTER — NURSE TRIAGE (OUTPATIENT)
Dept: OTHER | Facility: CLINIC | Age: 87
End: 2022-11-17

## 2022-11-17 NOTE — TELEPHONE ENCOUNTER
Location of patient: [address] [place] from [facility] with [employer]. Subjective: Caller states \"I have shoulder pain in my right and left shoulder. It gets worse when I go to bed and sleep. Right now it feels ok. \"     Current Symptoms: right and left shoulder pain    Onset:  unsure, states she can't keep up with time frames     Associated Symptoms: NA    Pain Severity: 3/10; ; intermittent    Temperature: None     What has been tried: Tylenol     Recommended disposition: See in Office Within 2 Weeks - Tuesday and Thursday in December. Kindly complained about the time it took to get an appointment and all the people she had to speak with. Attempted to reach out to office to make appointment to cut down on time, was placed directly on hold. Care advice provided, patient verbalizes understanding; denies any other questions or concerns; instructed to call back for any new or worsening symptoms. Patient/Caller agrees with recommended disposition; writer provided warm transfer to [facility] for appointment scheduling    Attention Provider: Thank you for allowing me to participate in the care of your patient. The patient was connected to triage in response to information provided to the ECC. Please do not respond through this encounter as the response is not directed to a shared pool. Reason for Disposition   MILD pain (e.g., does not interfere with normal activities) and present > 7 days    Additional Information   Negative: Difficulty breathing or unusual sweating (e.g., sweating without exertion)     SOB since open heart surgery, unchanged.     Protocols used: Shoulder Pain-ADULT-[place]

## 2022-11-28 DIAGNOSIS — Z86.718 HISTORY OF DEEP VEIN THROMBOSIS (DVT) OF LOWER EXTREMITY: ICD-10-CM

## 2022-11-28 RX ORDER — RIVAROXABAN 10 MG/1
TABLET, FILM COATED ORAL
Qty: 90 TABLET | Refills: 3 | Status: SHIPPED | OUTPATIENT
Start: 2022-11-28

## 2022-12-29 ENCOUNTER — OFFICE VISIT (OUTPATIENT)
Dept: INTERNAL MEDICINE CLINIC | Age: 87
End: 2022-12-29
Payer: MEDICARE

## 2022-12-29 VITALS
RESPIRATION RATE: 18 BRPM | HEIGHT: 65 IN | BODY MASS INDEX: 28.02 KG/M2 | HEART RATE: 67 BPM | OXYGEN SATURATION: 94 % | SYSTOLIC BLOOD PRESSURE: 105 MMHG | WEIGHT: 168.2 LBS | TEMPERATURE: 98.3 F | DIASTOLIC BLOOD PRESSURE: 48 MMHG

## 2022-12-29 DIAGNOSIS — E78.5 HYPERLIPIDEMIA WITH TARGET LDL LESS THAN 70: ICD-10-CM

## 2022-12-29 DIAGNOSIS — R07.81 RIB PAIN ON RIGHT SIDE: ICD-10-CM

## 2022-12-29 DIAGNOSIS — Z86.718 HISTORY OF DEEP VEIN THROMBOSIS (DVT) OF LOWER EXTREMITY: ICD-10-CM

## 2022-12-29 DIAGNOSIS — I95.2 HYPOTENSION DUE TO DRUGS: ICD-10-CM

## 2022-12-29 DIAGNOSIS — G89.29 CHRONIC LEFT SHOULDER PAIN: Primary | ICD-10-CM

## 2022-12-29 DIAGNOSIS — I10 ESSENTIAL HYPERTENSION: ICD-10-CM

## 2022-12-29 DIAGNOSIS — M25.512 CHRONIC LEFT SHOULDER PAIN: Primary | ICD-10-CM

## 2022-12-29 PROCEDURE — 99214 OFFICE O/P EST MOD 30 MIN: CPT | Performed by: INTERNAL MEDICINE

## 2022-12-29 PROCEDURE — G8536 NO DOC ELDER MAL SCRN: HCPCS | Performed by: INTERNAL MEDICINE

## 2022-12-29 PROCEDURE — 1101F PT FALLS ASSESS-DOCD LE1/YR: CPT | Performed by: INTERNAL MEDICINE

## 2022-12-29 PROCEDURE — G0463 HOSPITAL OUTPT CLINIC VISIT: HCPCS | Performed by: INTERNAL MEDICINE

## 2022-12-29 PROCEDURE — G8432 DEP SCR NOT DOC, RNG: HCPCS | Performed by: INTERNAL MEDICINE

## 2022-12-29 PROCEDURE — G8417 CALC BMI ABV UP PARAM F/U: HCPCS | Performed by: INTERNAL MEDICINE

## 2022-12-29 PROCEDURE — 1090F PRES/ABSN URINE INCON ASSESS: CPT | Performed by: INTERNAL MEDICINE

## 2022-12-29 PROCEDURE — G8427 DOCREV CUR MEDS BY ELIG CLIN: HCPCS | Performed by: INTERNAL MEDICINE

## 2022-12-29 NOTE — PROGRESS NOTES
Chief Complaint   Patient presents with    Shoulder Pain     Pt states she has R shoulder pain and weakness, overall body pains in joint, at worst 9 out of 10. Vitals:    12/29/22 1105   BP: (!) 105/48   Pulse: 67   Resp: 18   Temp: 98.3 °F (36.8 °C)   TempSrc: Temporal   SpO2: 94%   Weight: 168 lb 3.2 oz (76.3 kg)   Height: 5' 5\" (1.651 m)   PainSc:   7   PainLoc: Generalized       Current Outpatient Medications on File Prior to Visit   Medication Sig Dispense Refill    Xarelto 10 mg tablet TAKE 1 TABLET DAILY WITH BREAKFAST 90 Tablet 3    ezetimibe (ZETIA) 10 mg tablet TAKE 1 TABLET DAILY 90 Tablet 3    estradioL (Yuvafem) 10 mcg tab vaginal tablet USE 1 TABLET VAGINALLY ON TUESDAYS AND FRIDAYS 24 Tablet 3    ranolazine ER (RANEXA) 500 mg SR tablet Take 500 mg by mouth daily. methIMAzole (TAPAZOLE) 5 mg tablet       cyclobenzaprine (FLEXERIL) 5 mg tablet Take 1 Tablet by mouth three (3) times daily as needed for Muscle Spasm(s). 50 Tablet 1    isosorbide mononitrate ER (IMDUR) 60 mg CR tablet TAKE 1 TABLET BY MOUTH EVERY DAY IN THE MORNING      rosuvastatin (CRESTOR) 40 mg tablet TAKE 1 TABLET BY MOUTH EVERY DAY AT NIGHT 90 Tab 3    bumetanide (BUMEX) 1 mg tablet TAKE 1 TABLET BY MOUTH EVERY DAY      valsartan (DIOVAN) 40 mg tablet Take  by mouth daily. nitroglycerin (NITROSTAT) 0.4 mg SL tablet DISSOLVE 1 TABLET UNDER TONGUE EVERY 5 MINUTES AS NEEDED FOR CHEST PAIN 25 Tab 3    acetaminophen (TYLENOL) 500 mg tablet Take 2 Tablets by mouth every six (6) hours as needed. aspirin delayed-release 81 mg tablet Take 81 mg by mouth daily. nirmatrelvir-ritonavir (PAXLOVID) 300 mg (150 mg x 2)-100 mg Take 3 Tablets by mouth every twelve (12) hours. (Patient not taking: Reported on 12/29/2022) 1 Box 0    lisinopril (PRINIVIL, ZESTRIL) 10 mg tablet Take 1 Tab by mouth daily. 90 Tab 1    methIMAzole (TAPAZOLE) 10 mg tablet Take 5 mg by mouth daily.  Alternate between 5 mg and 10 mg daily (Patient not taking: Reported on 12/29/2022)       No current facility-administered medications on file prior to visit. Health Maintenance Due   Topic    Shingles Vaccine (1 of 2)    DTaP/Tdap/Td series (1 - Tdap)    COVID-19 Vaccine (2 - Aly risk series)    Flu Vaccine (1)       1. \"Have you been to the ER, urgent care clinic since your last visit? Hospitalized since your last visit? \" No    2. \"Have you seen or consulted any other health care providers outside of the [address] [place] [address] since your last visit? \" Yes Cardiologist Dr Enedelia Roger      3. For patients aged 39-70: Has the patient had a colonoscopy / FIT/ Cologuard? NA - based on age      If the patient is female:    4. For patients aged 41-77: Has the patient had a mammogram within the past 2 years? NA - based on age or sex      11. For patients aged 21-65: Has the patient had a pap smear?  NA - based on age or sex

## 2022-12-29 NOTE — PROGRESS NOTES
Chief Complaint   Patient presents with    Shoulder Pain     Pt states she has R shoulder pain and weakness, overall body pains in joint, at worst 9 out of 10. Left shoulder pain  for 6 month, no injury  Diffuse arthralgia    An 80 y.o. white female with multiple medical problems including coronary disease and peripheral vascular disease and history of pulmonary embolism. She has a variety of complaints. She says her biggest complaint is shoulder pain. She describes pain in the left shoulder that has bothered her for six months. No injury. She says range of motion has been reduced and she is not able to raise it up. She is not able up hold up a prayer book or a hymnal.  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 She does not know what is causing it and she hears a crunching sound initially. She has got a variety of other arthralgias and aches and pains and tenderness occasionally in the rib cage at the right lower rib cage. Her blood pressure is running low. I have reviewed her med list.  She is on a ACE inhibitor and an ARB, but I told her that is not recommended. I told her to discontinue the valsartan and only stay on the one ACE inhibitor. She is going to discuss that with her cardiologist.  I told her that is not standard of care. Her exam, range of motion of left shoulder is markedly reduced. Range of motion of right shoulder is almost normal.  She has got tenderness over the right lateral rib cage mostly in the 12th rib with motion. Her 12th rib appears to be close to the pelvic bone. I suspect she may be having some abrasions there.     Patient Active Problem List    Diagnosis    History of deep vein thrombosis (DVT) of lower extremity    Peripheral vascular disease (HCC)    Coronary artery disease with stable angina pectoris ([facility] [address]    History of pulmonary embolism    Calculus of gallbladder and bile duct without cholecystitis    Living will, counseling/discussion    Coronary artery disease involving autologous artery coronary bypass graft without angina pectoris     Stent 7/ 2015      Osteopenia     2010 hip -0.7. Hyperthyroidism     Followed by Dr Santhosh Islas. HTN (hypertension)    Hyperlipidemia with target LDL less than 70    Coronary artery disease due to calcified coronary lesion     2000. CABG. Followed by Dr Hector Ryan. 2013 Nuclear stress test .  2015 [facility] UZAIR Ramus saphenous graft. [email or web address]. Vitals:    12/29/22 1105   BP: (!) 105/48   Pulse: 67   Resp: 18   Temp: 98.3 °F (36.8 °C)   TempSrc: Temporal   SpO2: 94%   Weight: 168 lb 3.2 oz (76.3 kg)   Height: 5' 5\" (1.651 m)     A left shoulder exam was performed. GENERAL: no acute distress  SWELLING: none  DEFORMITY: none  TENDERNESS: none  ROM: limited by pain and passive elevation limited  STRENGTH: limited by pain  STABILITY: normal  NEUROLOGICAL EXAM: normal  VASCULAR EXAM: normal  NECK: supple  1. Chronic left shoulder pain  Offered PT  .diclofenac gel  - XR SHOULDER LT AP/LAT MIN 2 V; Future    2. Hypotension due to drugs  Stop valsartan    3. History of deep vein thrombosis (DVT) of lower extremity  On low does anticoag    4. Essential hypertension  o0k    5. Rib pain on right side  Reassure not serious    6.  Hyperlipidemia with target LDL less than 70  On meds

## 2022-12-30 ENCOUNTER — TELEPHONE (OUTPATIENT)
Dept: INTERNAL MEDICINE CLINIC | Age: 87
End: 2022-12-30

## 2022-12-30 DIAGNOSIS — M25.559 HIP PAIN: Primary | ICD-10-CM

## 2023-01-03 ENCOUNTER — TELEPHONE (OUTPATIENT)
Dept: INTERNAL MEDICINE CLINIC | Age: 88
End: 2023-01-03

## 2023-01-03 NOTE — TELEPHONE ENCOUNTER
Spoke to pt on the phone to let her know that her hip x-ray has been ordered.  Pt states that she will come in to  lab orders

## 2023-02-22 ENCOUNTER — TELEPHONE (OUTPATIENT)
Dept: INTERNAL MEDICINE CLINIC | Age: 88
End: 2023-02-22

## 2023-02-22 NOTE — TELEPHONE ENCOUNTER
LVM to call office back. Please adv PCP Mariely did not order ultrasound so no imaging has been sent. Pt needs to go back to [facility] to have images provided and possibly set appt to have read or to have physician at [facility] to read.

## 2023-02-22 NOTE — TELEPHONE ENCOUNTER
Pt called wanting results for ultrasound done on Friday. Advised nothing is showing in the system but a message was sent to Dr. Sameer Aviles inquiring about results.

## 2023-02-22 NOTE — TELEPHONE ENCOUNTER
----- Message from Rizwan Sevilla sent at 2/21/2023  9:59 AM EST -----  Subject: Results Request    QUESTIONS  Results: Ultrasound; Ordered by: Destiny Hale   Date Performed: 2023-02-17  ---------------------------------------------------------------------------  --------------  Rd SMALL    [phone]; OK to leave message on voicemail  ---------------------------------------------------------------------------  --------------

## 2023-02-23 NOTE — TELEPHONE ENCOUNTER
Pt checking on the status of ultrasound. I read pt the message left by nursing staff:      Please adv PCP Mariely did not order ultrasound so no imaging has been sent. Pt needs to go back to [facility] to have images provided and possibly set appt to have read or to have physician at [facility] to read. Pt verbalized understanding and stated that [facility] was supposed to send over these results for the Dr to review and she is going to contact [facility]. Pt is wondering if these results are sent over, if she will need an appointment to discuss them with Dr Virgil Dash.

## 2023-03-07 ENCOUNTER — OFFICE VISIT (OUTPATIENT)
Dept: INTERNAL MEDICINE CLINIC | Age: 88
End: 2023-03-07
Payer: MEDICARE

## 2023-03-07 VITALS
SYSTOLIC BLOOD PRESSURE: 140 MMHG | HEART RATE: 85 BPM | OXYGEN SATURATION: 98 % | HEIGHT: 65 IN | TEMPERATURE: 98.4 F | WEIGHT: 169 LBS | BODY MASS INDEX: 28.16 KG/M2 | DIASTOLIC BLOOD PRESSURE: 64 MMHG | RESPIRATION RATE: 16 BRPM

## 2023-03-07 DIAGNOSIS — I25.118 CORONARY ARTERY DISEASE OF NATIVE ARTERY OF NATIVE HEART WITH STABLE ANGINA PECTORIS (HCC): ICD-10-CM

## 2023-03-07 DIAGNOSIS — K80.20 CALCULUS OF GALLBLADDER WITHOUT CHOLECYSTITIS WITHOUT OBSTRUCTION: Primary | ICD-10-CM

## 2023-03-07 DIAGNOSIS — U07.1 COVID-19 IN IMMUNOCOMPROMISED PATIENT (HCC): ICD-10-CM

## 2023-03-07 DIAGNOSIS — D84.9 COVID-19 IN IMMUNOCOMPROMISED PATIENT (HCC): ICD-10-CM

## 2023-03-07 DIAGNOSIS — I73.9 PERIPHERAL VASCULAR DISEASE (HCC): ICD-10-CM

## 2023-03-07 DIAGNOSIS — M35.3 PMR (POLYMYALGIA RHEUMATICA) (HCC): ICD-10-CM

## 2023-03-07 PROCEDURE — 1101F PT FALLS ASSESS-DOCD LE1/YR: CPT | Performed by: INTERNAL MEDICINE

## 2023-03-07 PROCEDURE — G0463 HOSPITAL OUTPT CLINIC VISIT: HCPCS | Performed by: INTERNAL MEDICINE

## 2023-03-07 PROCEDURE — G8427 DOCREV CUR MEDS BY ELIG CLIN: HCPCS | Performed by: INTERNAL MEDICINE

## 2023-03-07 PROCEDURE — G8536 NO DOC ELDER MAL SCRN: HCPCS | Performed by: INTERNAL MEDICINE

## 2023-03-07 PROCEDURE — 1090F PRES/ABSN URINE INCON ASSESS: CPT | Performed by: INTERNAL MEDICINE

## 2023-03-07 PROCEDURE — G8417 CALC BMI ABV UP PARAM F/U: HCPCS | Performed by: INTERNAL MEDICINE

## 2023-03-07 PROCEDURE — 99213 OFFICE O/P EST LOW 20 MIN: CPT | Performed by: INTERNAL MEDICINE

## 2023-03-07 PROCEDURE — G8432 DEP SCR NOT DOC, RNG: HCPCS | Performed by: INTERNAL MEDICINE

## 2023-03-07 NOTE — PROGRESS NOTES
Chief Complaint   Patient presents with    Follow-up     Ultrasound results     [facility]en [facility]) for abd pain  10 to 12 days ago    Has US  at [facility] [address] [facility]  10 days ago   ? Results   will call for results      In 2018 had US showed stones    She was seen today, 80 y.o. female with a history of coronary disease, had an episode of abdominal pain, seen at [facility]. They did a plane x-ray and labs, reportedly nothing showed. They scheduled her for an outpatient ultrasound. The outpatient ultrasound was done at an [address] [facility]. She has had no report. I told her I do not have a report. The ordering facility was [facility], so she will need to call them. I did look back and about five years ago I did an outpatient ultrasound because of right upper quadrant pain and she did have some cholelithiasis then and she opted not for a surgical opinion at that time. She said her pain is better now. She has had no further significant upper quadrant pain. She denies nausea, vomiting. She eats well. Does not seem to have fatty food intolerance, but stays on a low saturated fat diet because of her coronary disease. I told her I could try to get records. I also suggested that she call [facility].  They get their report as were the ordering physician for the ultrasound. We will try to get reports from [facility] and try to get reports from the [facility], but I explained to them, they are not in our system and not always easy to get reports. She is not icteric and she had no belly pain or tenderness on exam.      So, I was reassured and no medications were offered. I did show her copies of the old ultrasound from five years ago that showed stones.     Vitals:    03/07/23 1204   BP: (!) 140/64   Pulse: 85   Resp: 16   Temp: 98.4 °F (36.9 °C)   SpO2: 98%   Weight: 169 lb (76.7 kg)   Height: 5' 5\" (1.651 m)       The abdomen is soft without tenderness, guarding, mass, rebound or organomegaly. Bowel sounds are normal. No CVA tenderness or inguinal adenopathy noted. 1. Calculus of gallbladder without cholecystitis without obstruction  No pain surgical opinion discussed           3. PMR (polymyalgia rheumatica) ([facility])  Not active    4. Peripheral vascular disease ([facility])  Pad  no claudication    5.  Coronary artery disease of native artery of native heart with stable angina pectoris ([address]  Minimal angina now

## 2023-03-07 NOTE — PROGRESS NOTES
Chief Complaint   Patient presents with    Follow-up     Ultrasound results     Visit Vitals  BP (!) 140/64   Pulse 85   Temp 98.4 °F (36.9 °C)   Resp 16   Ht 5' 5\" (1.651 m)   Wt 169 lb (76.7 kg)   SpO2 98%   BMI 28.12 kg/m²     1. Have you been to the ER, urgent care clinic since your last visit? Hospitalized since your last visit? No    2. Have you seen or consulted any other health care providers outside of the [address] [place] [address] since your last visit? Include any pap smears or colon screening.  No

## 2023-03-09 NOTE — TELEPHONE ENCOUNTER
Cholelithiasis and chronci cholecystitis seen on US    She has no symptoms now surgical referral is optional as no obstruction and normal labs    This was discussed

## 2023-03-30 ENCOUNTER — OFFICE VISIT (OUTPATIENT)
Dept: INTERNAL MEDICINE CLINIC | Age: 88
End: 2023-03-30
Payer: MEDICARE

## 2023-03-30 VITALS
HEIGHT: 65 IN | RESPIRATION RATE: 20 BRPM | BODY MASS INDEX: 28.19 KG/M2 | DIASTOLIC BLOOD PRESSURE: 57 MMHG | SYSTOLIC BLOOD PRESSURE: 99 MMHG | OXYGEN SATURATION: 91 % | HEART RATE: 81 BPM | TEMPERATURE: 98.3 F | WEIGHT: 169.2 LBS

## 2023-03-30 DIAGNOSIS — S09.91XA INJURY OF EAR, INITIAL ENCOUNTER: Primary | ICD-10-CM

## 2023-03-30 PROCEDURE — 99212 OFFICE O/P EST SF 10 MIN: CPT | Performed by: INTERNAL MEDICINE

## 2023-03-30 PROCEDURE — G8510 SCR DEP NEG, NO PLAN REQD: HCPCS | Performed by: INTERNAL MEDICINE

## 2023-03-30 PROCEDURE — G8536 NO DOC ELDER MAL SCRN: HCPCS | Performed by: INTERNAL MEDICINE

## 2023-03-30 PROCEDURE — G8417 CALC BMI ABV UP PARAM F/U: HCPCS | Performed by: INTERNAL MEDICINE

## 2023-03-30 PROCEDURE — G0463 HOSPITAL OUTPT CLINIC VISIT: HCPCS | Performed by: INTERNAL MEDICINE

## 2023-03-30 PROCEDURE — 1101F PT FALLS ASSESS-DOCD LE1/YR: CPT | Performed by: INTERNAL MEDICINE

## 2023-03-30 PROCEDURE — 1090F PRES/ABSN URINE INCON ASSESS: CPT | Performed by: INTERNAL MEDICINE

## 2023-03-30 PROCEDURE — G8427 DOCREV CUR MEDS BY ELIG CLIN: HCPCS | Performed by: INTERNAL MEDICINE

## 2023-03-30 RX ORDER — VALSARTAN 40 MG/1
TABLET ORAL
COMMUNITY
Start: 2023-03-14

## 2023-03-30 NOTE — PROGRESS NOTES
Patient is here for ear bleeding that lasted 5 hours that started yesterday. Chief Complaint   Patient presents with    Ear Drainage          Vitals:    03/30/23 1538   Temp: 98.3 °F (36.8 °C)   TempSrc: Temporal   Weight: 169 lb 3.2 oz (76.7 kg)       Current Outpatient Medications on File Prior to Visit   Medication Sig Dispense Refill    Xarelto 10 mg tablet TAKE 1 TABLET DAILY WITH BREAKFAST 90 Tablet 3    ezetimibe (ZETIA) 10 mg tablet TAKE 1 TABLET DAILY 90 Tablet 3    estradioL (Yuvafem) 10 mcg tab vaginal tablet USE 1 TABLET VAGINALLY ON TUESDAYS AND FRIDAYS 24 Tablet 3    ranolazine ER (RANEXA) 500 mg SR tablet Take 500 mg by mouth daily. methIMAzole (TAPAZOLE) 5 mg tablet       isosorbide mononitrate ER (IMDUR) 60 mg CR tablet TAKE 1 TABLET BY MOUTH EVERY DAY IN THE MORNING      rosuvastatin (CRESTOR) 40 mg tablet TAKE 1 TABLET BY MOUTH EVERY DAY AT NIGHT 90 Tab 3    bumetanide (BUMEX) 1 mg tablet TAKE 1 TABLET BY MOUTH EVERY DAY      nitroglycerin (NITROSTAT) 0.4 mg SL tablet DISSOLVE 1 TABLET UNDER TONGUE EVERY 5 MINUTES AS NEEDED FOR CHEST PAIN 25 Tab 3    lisinopril (PRINIVIL, ZESTRIL) 10 mg tablet Take 1 Tab by mouth daily. 90 Tab 1    acetaminophen (TYLENOL) 500 mg tablet Take 2 Tablets by mouth every six (6) hours as needed. aspirin delayed-release 81 mg tablet Take 81 mg by mouth daily. valsartan (DIOVAN) 40 mg tablet       cyclobenzaprine (FLEXERIL) 5 mg tablet Take 1 Tablet by mouth three (3) times daily as needed for Muscle Spasm(s). (Patient not taking: Reported on 3/30/2023) 50 Tablet 1    methIMAzole (TAPAZOLE) 10 mg tablet Take 5 mg by mouth daily. Alternate between 5 mg and 10 mg daily (Patient not taking: No sig reported)       No current facility-administered medications on file prior to visit.        Health Maintenance Due   Topic    Shingles Vaccine (1 of 2)    DTaP/Tdap/Td series (1 - Tdap)    COVID-19 Vaccine (2 - Aly risk series)    Flu Vaccine (1)    Lipid Screen 1. \"Have you been to the ER, urgent care clinic since your last visit? Hospitalized since your last visit? \" No    2. \"Have you seen or consulted any other health care providers outside of the [address] [place] [address] since your last visit? \" No     3. For patients aged 39-70: Has the patient had a colonoscopy / FIT/ Cologuard? No      If the patient is female:    4. For patients aged 41-77: Has the patient had a mammogram within the past 2 years? No      5. For patients aged 21-65: Has the patient had a pap smear?  No

## 2023-03-30 NOTE — PROGRESS NOTES
Assessment/Plan:   1. Injury of ear, initial encounter    The above diagnosis is a new problem. We discussed expected course, resolution, and complications of diagnosis in detail. Return if symptoms worsen or fail to improve. Subjective:   Brian Kirkpatrick presents for evaluation of Ear Drainage     This started 1 day ago, and is controlled since that time. She also reports  mild right ear pain and bleeding. She uses qtip to remove ear wax that led to bleeding for 4 hours, she is on xarelto. The bleeding has stopped but she has muffling of hearing . She denies a history of: URI symptoms. Treatments have included: none. Relevant PMH: DVT. Patient reports sick contacts: no    BP (!) 99/57 (BP 1 Location: Left upper arm, BP Patient Position: Sitting, BP Cuff Size: Adult)   Pulse 81   Temp 98.3 °F (36.8 °C) (Temporal)   Resp 20   Ht 5' 5\" (1.651 m)   Wt 169 lb 3.2 oz (76.7 kg)   SpO2 91%   BMI 28.16 kg/m²      Physical Exam  General: alert, cooperative, no distress, appears stated age  Ear exam: abnormal external canal AD - not visualized secondary to drainage  Heart exam: normal rate, regular rhythm, normal S1, S2, no murmurs, rubs, clicks or gallops  Lung exam: clear to auscultation bilaterally  Right ear canal has blood clot that is stuck on to the canal, no active bleeding. Recommend to use gently cleaning with debrox cleaning kit, avoid q tips and return if symptoms persist after a week   An electronic signature was used to authenticate this note.   -- Agustin Salinas MD

## 2023-04-11 PROBLEM — D84.9 COVID-19 IN IMMUNOCOMPROMISED PATIENT (HCC): Status: RESOLVED | Noted: 2023-03-07 | Resolved: 2023-04-11

## 2023-04-11 PROBLEM — J30.9 ALLERGIC RHINITIS, UNSPECIFIED SEASONALITY, UNSPECIFIED TRIGGER: Status: ACTIVE | Noted: 2023-04-11

## 2023-04-11 PROBLEM — I95.2 HYPOTENSION DUE TO DRUGS: Status: ACTIVE | Noted: 2023-04-11

## 2023-04-11 PROBLEM — U07.1 COVID-19 IN IMMUNOCOMPROMISED PATIENT (HCC): Status: RESOLVED | Noted: 2023-03-07 | Resolved: 2023-04-11

## 2023-04-20 ENCOUNTER — TELEPHONE (OUTPATIENT)
Dept: INTERNAL MEDICINE CLINIC | Age: 88
End: 2023-04-20

## 2023-04-21 ENCOUNTER — TELEPHONE (OUTPATIENT)
Dept: INTERNAL MEDICINE CLINIC | Age: 88
End: 2023-04-21

## 2023-04-21 RX ORDER — METHYLPREDNISOLONE 4 MG/1
TABLET ORAL
Qty: 1 DOSE PACK | Refills: 0 | Status: SHIPPED | OUTPATIENT
Start: 2023-04-21

## 2023-04-21 NOTE — TELEPHONE ENCOUNTER
Called pt to inform her that Josué Vazquez has sent in a Medrol dose steroid pack. Medication has been sent to local pharmacy on file.  Was advised to come and make an appointment if medication did not help in a week

## 2023-05-02 ENCOUNTER — TELEPHONE (OUTPATIENT)
Dept: INTERNAL MEDICINE CLINIC | Age: 88
End: 2023-05-02

## 2023-05-02 DIAGNOSIS — J40 BRONCHITIS: ICD-10-CM

## 2023-05-02 RX ORDER — BENZONATATE 200 MG/1
200 CAPSULE ORAL
Qty: 30 CAPSULE | Refills: 0 | Status: SHIPPED | OUTPATIENT
Start: 2023-05-02

## 2023-05-12 ENCOUNTER — OFFICE VISIT (OUTPATIENT)
Age: 88
End: 2023-05-12
Payer: MEDICARE

## 2023-05-12 VITALS
TEMPERATURE: 98.2 F | DIASTOLIC BLOOD PRESSURE: 71 MMHG | HEART RATE: 88 BPM | HEIGHT: 65 IN | SYSTOLIC BLOOD PRESSURE: 126 MMHG | BODY MASS INDEX: 26.86 KG/M2 | OXYGEN SATURATION: 98 % | RESPIRATION RATE: 20 BRPM | WEIGHT: 161.2 LBS

## 2023-05-12 DIAGNOSIS — J30.9 ALLERGIC RHINITIS, UNSPECIFIED SEASONALITY, UNSPECIFIED TRIGGER: ICD-10-CM

## 2023-05-12 DIAGNOSIS — T14.8XXA ABRASION: ICD-10-CM

## 2023-05-12 DIAGNOSIS — J40 BRONCHITIS, NOT SPECIFIED AS ACUTE OR CHRONIC: Primary | ICD-10-CM

## 2023-05-12 PROCEDURE — 1123F ACP DISCUSS/DSCN MKR DOCD: CPT | Performed by: PHYSICIAN ASSISTANT

## 2023-05-12 PROCEDURE — 1090F PRES/ABSN URINE INCON ASSESS: CPT | Performed by: PHYSICIAN ASSISTANT

## 2023-05-12 PROCEDURE — G8427 DOCREV CUR MEDS BY ELIG CLIN: HCPCS | Performed by: PHYSICIAN ASSISTANT

## 2023-05-12 PROCEDURE — G8419 CALC BMI OUT NRM PARAM NOF/U: HCPCS | Performed by: PHYSICIAN ASSISTANT

## 2023-05-12 PROCEDURE — 4004F PT TOBACCO SCREEN RCVD TLK: CPT | Performed by: PHYSICIAN ASSISTANT

## 2023-05-12 PROCEDURE — 99214 OFFICE O/P EST MOD 30 MIN: CPT | Performed by: PHYSICIAN ASSISTANT

## 2023-05-12 RX ORDER — ATORVASTATIN CALCIUM 20 MG/1
20 TABLET, FILM COATED ORAL DAILY
COMMUNITY
Start: 2018-12-14

## 2023-05-12 RX ORDER — BENZONATATE 200 MG/1
200 CAPSULE ORAL 3 TIMES DAILY PRN
Qty: 30 CAPSULE | Refills: 0 | Status: SHIPPED | OUTPATIENT
Start: 2023-05-12 | End: 2023-05-19

## 2023-05-12 RX ORDER — CETIRIZINE HYDROCHLORIDE 10 MG/1
10 TABLET ORAL DAILY
COMMUNITY

## 2023-05-12 SDOH — ECONOMIC STABILITY: HOUSING INSECURITY
IN THE LAST 12 MONTHS, WAS THERE A TIME WHEN YOU DID NOT HAVE A STEADY PLACE TO SLEEP OR SLEPT IN A SHELTER (INCLUDING NOW)?: NO

## 2023-05-12 SDOH — ECONOMIC STABILITY: FOOD INSECURITY: WITHIN THE PAST 12 MONTHS, THE FOOD YOU BOUGHT JUST DIDN'T LAST AND YOU DIDN'T HAVE MONEY TO GET MORE.: NEVER TRUE

## 2023-05-12 SDOH — ECONOMIC STABILITY: INCOME INSECURITY: HOW HARD IS IT FOR YOU TO PAY FOR THE VERY BASICS LIKE FOOD, HOUSING, MEDICAL CARE, AND HEATING?: NOT HARD AT ALL

## 2023-05-12 SDOH — ECONOMIC STABILITY: FOOD INSECURITY: WITHIN THE PAST 12 MONTHS, YOU WORRIED THAT YOUR FOOD WOULD RUN OUT BEFORE YOU GOT MONEY TO BUY MORE.: NEVER TRUE

## 2023-05-12 ASSESSMENT — ENCOUNTER SYMPTOMS
BACK PAIN: 0
SHORTNESS OF BREATH: 0
VOMITING: 0
SINUS PRESSURE: 0
SORE THROAT: 0
COUGH: 1
TROUBLE SWALLOWING: 0

## 2023-05-12 ASSESSMENT — PATIENT HEALTH QUESTIONNAIRE - PHQ9
SUM OF ALL RESPONSES TO PHQ QUESTIONS 1-9: 0
SUM OF ALL RESPONSES TO PHQ9 QUESTIONS 1 & 2: 0
SUM OF ALL RESPONSES TO PHQ QUESTIONS 1-9: 0
SUM OF ALL RESPONSES TO PHQ QUESTIONS 1-9: 0
1. LITTLE INTEREST OR PLEASURE IN DOING THINGS: 0
2. FEELING DOWN, DEPRESSED OR HOPELESS: 0
SUM OF ALL RESPONSES TO PHQ QUESTIONS 1-9: 0

## 2023-05-12 NOTE — PROGRESS NOTES
Patient is here for cough. Chief Complaint   Patient presents with    Follow-up    Cough          [unfilled]    Current Outpatient Medications on File Prior to Visit   Medication Sig Dispense Refill    atorvastatin (LIPITOR) 20 MG tablet Take 1 tablet by mouth daily      acetaminophen (TYLENOL) 500 MG tablet Take 2 tablets by mouth every 6 hours as needed      aspirin 81 MG EC tablet Take 1 tablet by mouth daily      bumetanide (BUMEX) 1 MG tablet Take 1 tablet by mouth daily      cyclobenzaprine (FLEXERIL) 5 MG tablet Take 1 tablet by mouth 3 times daily as needed      Estradiol (VAGIFEM) 10 MCG TABS vaginal tablet USE 1 TABLET VAGINALLY ON TUESDAYS AND FRIDAYS      ezetimibe (ZETIA) 10 MG tablet Take 1 tablet by mouth daily      isosorbide mononitrate (IMDUR) 60 MG extended release tablet Take 1 tablet by mouth every morning      lisinopril (PRINIVIL;ZESTRIL) 10 MG tablet Take 1 tablet by mouth daily      methIMAzole (TAPAZOLE) 10 MG tablet Take 0.5 tablets by mouth daily      methIMAzole (TAPAZOLE) 5 MG tablet ceived the following from [facility]: Outside name: methIMAzole (TAPAZOLE) 5 mg tablet      nitroGLYCERIN (NITROSTAT) 0.4 MG SL tablet DISSOLVE 1 TABLET UNDER TONGUE EVERY 5 MINUTES AS NEEDED FOR CHEST PAIN      ranolazine (RANEXA) 500 MG extended release tablet Take 1 tablet by mouth daily      rivaroxaban (XARELTO) 10 MG TABS tablet Take 1 tablet by mouth daily (with breakfast)      rosuvastatin (CRESTOR) 40 MG tablet Take 1 tablet by mouth nightly       No current facility-administered medications on file prior to visit. Health Maintenance Due   Topic    DTaP/Tdap/Td vaccine (1 - Tdap)    Shingles vaccine (1 of 2)    COVID-19 Vaccine (3 - Booster for [employer] series)    Lipids        1. \"Have you been to the ER, urgent care clinic since your last visit? Hospitalized since your last visit? \" No    2.  \"Have you seen or consulted any other health care providers outside of the [facility]

## 2023-05-12 NOTE — PROGRESS NOTES
Alexys Sage is a 80 y.o. female  Chief Complaint   Patient presents with    Follow-up    Cough     Vitals:    05/12/23 1505   BP: 126/71   Pulse: 88   Resp: 20   Temp: 98.2 °F (36.8 °C)   SpO2: 98%      Wt Readings from Last 3 Encounters:   05/12/23 161 lb 3.2 oz (73.1 kg)   04/11/23 166 lb 6.4 oz (75.5 kg)   03/30/23 169 lb 3.2 oz (76.7 kg)     BMI Readings from Last 3 Encounters:   05/12/23 26.83 kg/m²   04/11/23 27.69 kg/m²   03/30/23 28.16 kg/m²     Health Maintenance Due   Topic Date Due    DTaP/Tdap/Td vaccine (1 - Tdap) Never done    Shingles vaccine (1 of 2) 10/27/2010    COVID-19 Vaccine (3 - Booster for Jonathan series) 08/04/2021    Lipids  01/27/2023       HPI  Bronchitis last month. Tx with Augmentin and Medrol. Feeling better but still has a mild lingering cough. Hasn't started allergy meds. Scraped R arm on a  in the closet yesterday. Hasn't bandaged it yet. ROS  Review of Systems   Constitutional:  Negative for fever. HENT:  Positive for congestion. Negative for ear discharge, sinus pressure, sore throat and trouble swallowing. Respiratory:  Positive for cough. Negative for shortness of breath. Cardiovascular:  Negative for chest pain. Gastrointestinal:  Negative for vomiting. Musculoskeletal:  Negative for back pain. Skin:  Negative for rash. Shallow thin abrasion with scabbing and mild bruising noted R arm. Allergic/Immunologic: Positive for environmental allergies. Neurological:  Negative for dizziness, syncope and headaches. EXAM  Physical Exam  Vitals and nursing note reviewed. Constitutional:       General: She is not in acute distress. Appearance: Normal appearance. She is not toxic-appearing. HENT:      Head: Normocephalic and atraumatic. Right Ear: Ear canal and external ear normal.      Left Ear: Ear canal and external ear normal.      Ears:      Comments: Bilateral TMs with fluid. No erythema.      Nose: Congestion and rhinorrhea

## 2023-06-30 ENCOUNTER — TELEPHONE (OUTPATIENT)
Age: 88
End: 2023-06-30

## 2023-06-30 DIAGNOSIS — M10.9 ACUTE GOUT, UNSPECIFIED CAUSE, UNSPECIFIED SITE: Primary | ICD-10-CM

## 2023-06-30 RX ORDER — PREDNISONE 20 MG/1
20 TABLET ORAL 2 TIMES DAILY
Qty: 10 TABLET | Refills: 0 | Status: SHIPPED | OUTPATIENT
Start: 2023-06-30 | End: 2023-07-05

## 2023-07-03 RX ORDER — ESTRADIOL 10 UG/1
TABLET VAGINAL
Qty: 24 TABLET | Refills: 0 | Status: SHIPPED | OUTPATIENT
Start: 2023-07-03

## 2023-07-03 RX ORDER — EZETIMIBE 10 MG/1
TABLET ORAL
Qty: 90 TABLET | Refills: 3 | Status: SHIPPED | OUTPATIENT
Start: 2023-07-03

## 2023-10-08 RX ORDER — ESTRADIOL 10 UG/1
TABLET VAGINAL
Qty: 24 TABLET | Refills: 0 | Status: SHIPPED | OUTPATIENT
Start: 2023-10-08

## 2023-11-25 RX ORDER — RIVAROXABAN 10 MG/1
10 TABLET, FILM COATED ORAL
Qty: 90 TABLET | Refills: 0 | Status: SHIPPED | OUTPATIENT
Start: 2023-11-25

## 2023-12-26 RX ORDER — ESTRADIOL 10 UG/1
TABLET VAGINAL
Qty: 24 TABLET | Refills: 0 | Status: SHIPPED | OUTPATIENT
Start: 2023-12-26

## 2024-01-18 ENCOUNTER — OFFICE VISIT (OUTPATIENT)
Age: 89
End: 2024-01-18
Payer: MEDICARE

## 2024-01-18 VITALS
SYSTOLIC BLOOD PRESSURE: 111 MMHG | HEART RATE: 72 BPM | DIASTOLIC BLOOD PRESSURE: 60 MMHG | RESPIRATION RATE: 18 BRPM | OXYGEN SATURATION: 95 % | TEMPERATURE: 98 F | WEIGHT: 159.4 LBS | BODY MASS INDEX: 26.53 KG/M2

## 2024-01-18 DIAGNOSIS — J06.9 URI, ACUTE: Primary | ICD-10-CM

## 2024-01-18 DIAGNOSIS — I73.9 PERIPHERAL VASCULAR DISEASE, UNSPECIFIED (HCC): ICD-10-CM

## 2024-01-18 DIAGNOSIS — I25.118 CORONARY ARTERY DISEASE OF NATIVE HEART WITH STABLE ANGINA PECTORIS, UNSPECIFIED VESSEL OR LESION TYPE (HCC): ICD-10-CM

## 2024-01-18 PROCEDURE — 99213 OFFICE O/P EST LOW 20 MIN: CPT | Performed by: INTERNAL MEDICINE

## 2024-01-18 PROCEDURE — G8419 CALC BMI OUT NRM PARAM NOF/U: HCPCS | Performed by: INTERNAL MEDICINE

## 2024-01-18 PROCEDURE — 1123F ACP DISCUSS/DSCN MKR DOCD: CPT | Performed by: INTERNAL MEDICINE

## 2024-01-18 PROCEDURE — G8484 FLU IMMUNIZE NO ADMIN: HCPCS | Performed by: INTERNAL MEDICINE

## 2024-01-18 PROCEDURE — G8427 DOCREV CUR MEDS BY ELIG CLIN: HCPCS | Performed by: INTERNAL MEDICINE

## 2024-01-18 PROCEDURE — 1090F PRES/ABSN URINE INCON ASSESS: CPT | Performed by: INTERNAL MEDICINE

## 2024-01-18 PROCEDURE — 1036F TOBACCO NON-USER: CPT | Performed by: INTERNAL MEDICINE

## 2024-01-18 RX ORDER — LOSARTAN POTASSIUM 25 MG/1
25 TABLET ORAL DAILY
COMMUNITY
Start: 2024-01-14

## 2024-01-18 RX ORDER — CLOPIDOGREL BISULFATE 75 MG/1
75 TABLET ORAL DAILY
COMMUNITY

## 2024-01-18 NOTE — PROGRESS NOTES
I have reviewed all needed documentation in preparation for visit. Verified patient by name and date of birth  No chief complaint on file.      There were no vitals filed for this visit.    Health Maintenance Due   Topic Date Due    DTaP/Tdap/Td vaccine (1 - Tdap) Never done    Respiratory Syncytial Virus (RSV) Pregnant or age 60 yrs+ (1 - 1-dose 60+ series) Never done    Shingles vaccine (1 of 2) 10/27/2010    Lipids  01/27/2023    Flu vaccine (1) 08/01/2023    COVID-19 Vaccine (3 - 2023-24 season) 09/01/2023    Annual Wellness Visit (Medicare)  11/27/2023       1. \"Have you been to the ER, urgent care clinic since your last visit?  Hospitalized since your last visit?\" Yes, César    2. \"Have you seen or consulted any other health care providers outside of the [facility] since your last visit?\" No    3. For patients aged 45-75: Has the patient had a colonoscopy / FIT/ Cologuard? NA      If the patient is female:    4. For patients aged 40-74: Has the patient had a mammogram within the past 2 years? NA      5. For patients aged 21-65: Has the patient had a pap smear? NA        MAKAYLA Lorenzana

## 2024-01-18 NOTE — PROGRESS NOTES
Go out Sunday    Had cath on Friday   3 stents were added       St 3 day    Short hospital stay for unstable angina and was then brought back and had procedure on Friday of last week after.  She had a coronary cath done by cardiovascular [facility] [place] [facility] and she had 3 stents placed at an appropriate prescription she is now on dual antiplatelet therapy with aspirin and Plavix and is weak and tired from the procedure they attempted to do the procedure from a radial artery but were not able to advance the catheter so they switched him to the from the femoral she is not having any more angina we reviewed her present medications including the dual antiplatelet therapy dismissed as the use and her son was present she actually has sore throat and is developed a little cough that has been bothering her for couple days and that there was symptom no fever chills no severe body aches her physical exam    Vitals are stable       Vitals:    01/18/24 1120   BP: 111/60   Site: Left Upper Arm   Position: Sitting   Cuff Size: Large Adult   Pulse: 72   Resp: 18   Temp: 98 °F (36.7 °C)   TempSrc: Temporal   SpO2: 95%   Weight: 72.3 kg (159 lb 6.4 oz)     High risk medications reviewed  pharynx is not injected there is no exudate there is no tonsil enlargement she also had no adenopathy in her neck range of motion of the C-spine was normal lungs were clear S1-S2 is regular      Kristen was seen today for pharyngitis.    Diagnoses and all orders for this visit:    URI, acute    Coronary artery disease of native heart with stable angina pectoris, unspecified vessel or lesion type (HCC)    Peripheral vascular disease, unspecified (HCC)      The patient complains of symptoms consistent with a viral upper respiratory infection.  she has non-productive cough without dyspnea or wheezing.  Symptomatic therapy suggested: mucinex 1200mg twice daily, pseudofedrine, tylenol or advil as directed on bottle.  Increase fluids, use vaporizer,

## 2024-01-19 PROBLEM — M35.3 PMR (POLYMYALGIA RHEUMATICA) (HCC): Status: RESOLVED | Noted: 2023-03-07 | Resolved: 2024-01-19

## 2024-02-06 ENCOUNTER — TELEPHONE (OUTPATIENT)
Age: 89
End: 2024-02-06

## 2024-02-06 NOTE — TELEPHONE ENCOUNTER
Patient was in the hospital 3 weeks ago and says the doctors in the hospital added new medications and told her to have her pcp refill it. She wants to discuss new meds with her doctor.

## 2024-02-07 NOTE — TELEPHONE ENCOUNTER
Pt is requesting a refill for the following medications      rosuvastatin (CRESTOR) 40 MG tablet     methIMAzole (TAPAZOLE) 5 MG tablet     ranolazine (RANEXA) 500 MG extended release tablet     These 3 meds are 90 Day Scripts    clopidogrel (PLAVIX) 75 MG tablet     This is a 30 day script.    Pt has not had her meds in a few days. She is all out.

## 2024-02-07 NOTE — TELEPHONE ENCOUNTER
Pt calling about her refills. She states that she has been without them for almost 4 days and needs them.

## 2024-02-07 NOTE — TELEPHONE ENCOUNTER
Refill request received from [facility] for   Requested Prescriptions     Pending Prescriptions Disp Refills    rosuvastatin (CRESTOR) 40 MG tablet 30 tablet      Sig: Take 1 tablet by mouth nightly    methIMAzole (TAPAZOLE) 10 MG tablet       Sig: Take 0.5 tablets by mouth daily    ranolazine (RANEXA) 500 MG extended release tablet 60 tablet      Sig: Take 1 tablet by mouth daily    clopidogrel (PLAVIX) 75 MG tablet 30 tablet      Sig: Take 1 tablet by mouth daily     Last office visit: 1/18/2024   Next office visit: Visit date not found     Routed to Dr Jose Gaming for review.

## 2024-02-08 RX ORDER — CLOPIDOGREL BISULFATE 75 MG/1
75 TABLET ORAL DAILY
Qty: 30 TABLET | OUTPATIENT
Start: 2024-02-08

## 2024-02-08 RX ORDER — METHIMAZOLE 10 MG/1
5 TABLET ORAL DAILY
Qty: 45 TABLET | Refills: 1 | Status: SHIPPED | OUTPATIENT
Start: 2024-02-08

## 2024-02-08 RX ORDER — RANOLAZINE 500 MG/1
500 TABLET, EXTENDED RELEASE ORAL DAILY
Qty: 60 TABLET | OUTPATIENT
Start: 2024-02-08

## 2024-02-08 RX ORDER — LOSARTAN POTASSIUM 25 MG/1
25 TABLET ORAL DAILY
Qty: 90 TABLET | Refills: 1 | Status: SHIPPED | OUTPATIENT
Start: 2024-02-08

## 2024-02-08 RX ORDER — ROSUVASTATIN CALCIUM 40 MG/1
40 TABLET, COATED ORAL NIGHTLY
Qty: 90 TABLET | Refills: 3 | Status: SHIPPED | OUTPATIENT
Start: 2024-02-08

## 2024-02-08 RX ORDER — METHIMAZOLE 5 MG/1
TABLET ORAL
OUTPATIENT
Start: 2024-02-08

## 2024-02-08 RX ORDER — CLOPIDOGREL BISULFATE 75 MG/1
75 TABLET ORAL DAILY
Qty: 90 TABLET | Refills: 1 | Status: SHIPPED | OUTPATIENT
Start: 2024-02-08

## 2024-02-08 RX ORDER — RANOLAZINE 500 MG/1
500 TABLET, EXTENDED RELEASE ORAL DAILY
Qty: 60 TABLET | Refills: 5 | Status: SHIPPED | OUTPATIENT
Start: 2024-02-08

## 2024-02-08 NOTE — TELEPHONE ENCOUNTER
Refill request received from [facility] for   Requested Prescriptions     Pending Prescriptions Disp Refills    methIMAzole (TAPAZOLE) 5 MG tablet       Sig: ceived the following from [facility]: Outside name: methIMAzole (TAPAZOLE) 5 mg tablet    losartan (COZAAR) 25 MG tablet 30 tablet      Sig: Take 1 tablet by mouth daily    clopidogrel (PLAVIX) 75 MG tablet 30 tablet      Sig: Take 1 tablet by mouth daily     Last office visit: 1/18/2024   Next office visit: Visit date not found     Routed to Dr Jose Gaming for review.

## 2024-02-08 NOTE — TELEPHONE ENCOUNTER
Refill request received from [facility] for   Requested Prescriptions     Pending Prescriptions Disp Refills    methIMAzole (TAPAZOLE) 5 MG tablet       Sig: ceived the following from [facility]: Outside name: methIMAzole (TAPAZOLE) 5 mg tablet    losartan (COZAAR) 25 MG tablet 30 tablet      Sig: Take 1 tablet by mouth daily    clopidogrel (PLAVIX) 75 MG tablet 30 tablet      Sig: Take 1 tablet by mouth daily    ranolazine (RANEXA) 500 MG extended release tablet 60 tablet      Sig: Take 1 tablet by mouth daily     Last Visit Date:1/18/2024   Next Visit Date:Visit date not found     Routed to Dr Jose Gaming for review.     MAKAYLA Stern

## 2024-02-19 RX ORDER — ESTRADIOL 10 UG/1
TABLET VAGINAL
Qty: 24 TABLET | Refills: 0 | Status: SHIPPED | OUTPATIENT
Start: 2024-02-19

## 2024-02-19 NOTE — TELEPHONE ENCOUNTER
Refill request received from [facility] for   Requested Prescriptions     Pending Prescriptions Disp Refills    YUVAFEM 10 MCG TABS vaginal tablet [Pharmacy Med Name: YUVAFEM 10 MCG VAGINAL INSERT] 24 tablet 0     Sig: USE 1 TABLET VAGINALLY ON TUESDAYS AND FRIDAYS     Last office visit: 1/18/2024   Next office visit: Visit date not found     Routed to Dr George Morrison for review.

## 2024-02-21 NOTE — TELEPHONE ENCOUNTER
Refill request received from [facility] for   Requested Prescriptions     Pending Prescriptions Disp Refills    XARELTO 10 MG TABS tablet [Pharmacy Med Name: XARELTO TABS 10MG] 90 tablet 3     Sig: TAKE 1 TABLET DAILY WITH BREAKFAST     Last appointment: 1/18/2024   Next appointment: Visit date not found     Routed to Dr George Morrison for review.

## 2024-02-22 ENCOUNTER — OFFICE VISIT (OUTPATIENT)
Age: 89
End: 2024-02-22
Payer: MEDICARE

## 2024-02-22 VITALS
HEART RATE: 57 BPM | SYSTOLIC BLOOD PRESSURE: 108 MMHG | DIASTOLIC BLOOD PRESSURE: 68 MMHG | HEIGHT: 65 IN | BODY MASS INDEX: 26.16 KG/M2 | RESPIRATION RATE: 18 BRPM | WEIGHT: 157 LBS | OXYGEN SATURATION: 97 % | TEMPERATURE: 98.2 F

## 2024-02-22 DIAGNOSIS — R51.9 ACUTE INTRACTABLE HEADACHE, UNSPECIFIED HEADACHE TYPE: ICD-10-CM

## 2024-02-22 DIAGNOSIS — R42 VERTIGO: Primary | ICD-10-CM

## 2024-02-22 DIAGNOSIS — Z79.01 CHRONIC ANTICOAGULATION: ICD-10-CM
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
## 2024-02-22 PROCEDURE — 1090F PRES/ABSN URINE INCON ASSESS: CPT | Performed by: NURSE PRACTITIONER

## 2024-02-22 PROCEDURE — G8484 FLU IMMUNIZE NO ADMIN: HCPCS | Performed by: NURSE PRACTITIONER

## 2024-02-22 PROCEDURE — 1123F ACP DISCUSS/DSCN MKR DOCD: CPT | Performed by: NURSE PRACTITIONER

## 2024-02-22 PROCEDURE — G8427 DOCREV CUR MEDS BY ELIG CLIN: HCPCS | Performed by: NURSE PRACTITIONER

## 2024-02-22 PROCEDURE — G8419 CALC BMI OUT NRM PARAM NOF/U: HCPCS | Performed by: NURSE PRACTITIONER

## 2024-02-22 PROCEDURE — 1036F TOBACCO NON-USER: CPT | Performed by: NURSE PRACTITIONER

## 2024-02-22 PROCEDURE — 99213 OFFICE O/P EST LOW 20 MIN: CPT | Performed by: NURSE PRACTITIONER

## 2024-02-22 RX ORDER — RIVAROXABAN 10 MG/1
10 TABLET, FILM COATED ORAL
Qty: 90 TABLET | Refills: 0 | Status: SHIPPED | OUTPATIENT
Start: 2024-02-22

## 2024-02-22 ASSESSMENT — PATIENT HEALTH QUESTIONNAIRE - PHQ9
SUM OF ALL RESPONSES TO PHQ QUESTIONS 1-9: 0
2. FEELING DOWN, DEPRESSED OR HOPELESS: 0
1. LITTLE INTEREST OR PLEASURE IN DOING THINGS: 0
SUM OF ALL RESPONSES TO PHQ QUESTIONS 1-9: 0
SUM OF ALL RESPONSES TO PHQ9 QUESTIONS 1 & 2: 0

## 2024-02-22 ASSESSMENT — ENCOUNTER SYMPTOMS: SHORTNESS OF BREATH: 0

## 2024-02-22 NOTE — PROGRESS NOTES
Kristen Smith is a 88 y.o. female  Chief Complaint   Patient presents with    Dizziness     Fall last on Tuesday- headache that start 3 days ago-       Vitals:    02/22/24 1525   BP: 109/71   Pulse: 57   Resp: 18   Temp: 98.2 °F (36.8 °C)   SpO2: 97%      Wt Readings from Last 3 Encounters:   02/22/24 71.2 kg (157 lb)   01/18/24 72.3 kg (159 lb 6.4 oz)   06/15/23 72.6 kg (160 lb)     BMI Readings from Last 3 Encounters:   02/22/24 26.13 kg/m²   01/18/24 26.53 kg/m²   06/15/23 26.63 kg/m²     Health Maintenance Due   Topic Date Due    DTaP/Tdap/Td vaccine (1 - Tdap) Never done    Respiratory Syncytial Virus (RSV) Pregnant or age 60 yrs+ (1 - 1-dose 60+ series) Never done    Shingles vaccine (1 of 2) 10/27/2010    Lipids  01/27/2023    Flu vaccine (1) 08/01/2023    COVID-19 Vaccine (3 - 2023-24 season) 09/01/2023    Annual Wellness Visit (Medicare)  11/27/2023     HPI  Patient of Dr Gaming here for acute visit.  Patient reports she fell last week. (10 days ago)  She is here today with a complaint of headache for 3 days.    Early Tuesday morning,  1:30 am.  Woke feeling Really dizzy\"   Hit closet door with head. Hit baseboard with top of head. Fell getting out of bed and bumped head on carpet. Hit bach of head. No loss of consciousness.  Reports she could not get up form fall. Was awake lying on floor/ heard Kevin talk to her to cover herself with a robe while lying on floor. Woke at 6am.  God told her to get up into bed. Stayed in bed for 2 days. Because of soreness.   Went to Urgent care- 2/15 / normal evaluation. Recommended to use a walker to ambulate.   History of vertigo in the past. On chronic anticoagulation.  Treated for HTN. No chest pain, palpitations, shortness of breath, urinary symptoms, fever, cough or ear pressure.     Over past week has a headache right posterior.    Not constant.  Shooting pain.     Has taken 1  tylenol.  Did not work.   Rates a 0/10.  Sharp pains / few seconds.  Denies vision

## 2024-02-22 NOTE — PROGRESS NOTES
I have reviewed all needed documentation in preparation for visit. Verified patient by name and date of birth  Chief Complaint   Patient presents with    Dizziness     Fall last on Tuesday- headache that start 3 days ago-       Vitals:    02/22/24 1525   BP: 109/71   Site: Right Upper Arm   Position: Sitting   Cuff Size: Medium Adult   Pulse: 57   Resp: 18   Temp: 98.2 °F (36.8 °C)   TempSrc: Temporal   SpO2: 97%   Weight: 71.2 kg (157 lb)   Height: 1.651 m (5' 5\")       Health Maintenance Due   Topic Date Due    DTaP/Tdap/Td vaccine (1 - Tdap) Never done    Respiratory Syncytial Virus (RSV) Pregnant or age 60 yrs+ (1 - 1-dose 60+ series) Never done    Shingles vaccine (1 of 2) 10/27/2010    Lipids  01/27/2023    Flu vaccine (1) 08/01/2023    COVID-19 Vaccine (3 - 2023-24 season) 09/01/2023    Annual Wellness Visit (Medicare)  11/27/2023       1. \"Have you been to the ER, urgent care clinic since your last visit?  Hospitalized since your last visit?\" YES    2. \"Have you seen or consulted any other health care providers outside of the [facility] since your last visit?\" YES     3. For patients aged 45-75: Has the patient had a colonoscopy / FIT/ Cologuard? NA - based on age      If the patient is female:    4. For patients aged 40-74: Has the patient had a mammogram within the past 2 years? NA - based on age or sex      5. For patients aged 21-65: Has the patient had a pap smear? NA - based on age or sex        Kalli ring [facility]

## 2024-03-11 ENCOUNTER — HOSPITAL ENCOUNTER (OUTPATIENT)
Facility: HOSPITAL | Age: 89
Discharge: HOME | End: 2024-03-14
Payer: MEDICARE

## 2024-03-11 DIAGNOSIS — R51.9 ACUTE INTRACTABLE HEADACHE, UNSPECIFIED HEADACHE TYPE: ICD-10-CM
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
## 2024-03-11 DIAGNOSIS — Z79.01 CHRONIC ANTICOAGULATION: ICD-10-CM

## 2024-03-11 DIAGNOSIS — R42 VERTIGO: ICD-10-CM

## 2024-03-11 PROCEDURE — 70450 CT HEAD/BRAIN W/O DYE: CPT

## 2024-03-14 ENCOUNTER — TELEPHONE (OUTPATIENT)
Age: 89
End: 2024-03-14

## 2024-03-15 NOTE — TELEPHONE ENCOUNTER
Called and spoke with pt.  Verified identity x2.    As per Vianca, pt informed there are no results from CT scan as of yet.  Pt verbalized understanding    Pt informed this nurse she does not use [employer]    Cathi Peck LPN

## 2024-04-10 NOTE — PROGRESS NOTES
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 to. Recent loss of dog 1 month ago. Poor relationship with mother, good with father. Abandonment issues with Mom who left. Homeless living [place] a bridge briefly during high school. Dated a girl in  who cheated on  with him, then developed idea that women cheat.    Past Medical History:      Past Medical History:   Diagnosis Date    CAD (coronary artery disease)     Chronic ischemic heart disease     COPD (chronic obstructive pulmonary disease) ([facility])     Depression 2024    Generalized anxiety disorder 2024    GERD (gastroesophageal reflux disease)     Hyperlipidemia     Hypertension     Major depressive disorder, recurrent, in full remission ([facility]) 2024    Medical marijuana use     Myocardial infarction ([facility])     twice    Panic disorder 2021    PTSD (post-traumatic stress disorder) 2024     Past Surgical History:   Procedure Laterality Date    ANGIOPLASTY      CARDIAC CATHETERIZATION N/A 2021    Procedure: Cardiac catheterization with [facility];  Surgeon: Panchito Fatima MD;  Location: [facility];  Service: Cardiology    CARDIAC CATHETERIZATION N/A 2021    Procedure: Cardiac Coronary Angiogram;  Surgeon: Panchito Fatima MD;  [facility]: [facility];  Service: Cardiology    CARDIAC CATHETERIZATION N/A 2021    Procedure: Cardiac pci;  Surgeon: Panchito Fatima MD;  [facility]: [facility];  Service: Cardiology    CARDIAC CATHETERIZATION Left 2023    Procedure: Cardiac Left Heart Cath;  Surgeon: Panchito Fatima MD;  [facility]: [facility];  Service: Cardiology    CARDIAC CATHETERIZATION N/A 2023    Procedure: Cardiac pci;  Surgeon: Panchito Fatima MD;  Location: [facility];  Service: Cardiology    CAROTID STENT      REPLACEMENT TOTAL KNEE BILATERAL Bilateral     THUMB FUSION  Right        Medical Review Of Systems:    Pertinent items are noted in HPI.    Allergies:    Allergies   Allergen Reactions    Latex Rash and Swelling       Medications:     All current active medications have been reviewed.  Medications prior to admission:    Prior to Admission Medications   Prescriptions Last Dose Informant Patient Reported? Taking?   Alirocumab 75 MG/ML SOAJ Past Month  Yes Yes   Sig: Inject 75 mg as directed every 14 (fourteen) days   Diclofenac Sodium (VOLTAREN) 1 % More than a month  Yes No   Sig: APPLY 4GM TO LOWER EXTREMITIES TOPICALLY TWICE A DAY FOR PAIN AND INFLAMMATION **USE DOSING CARD** (DO NOT EXCEED 16 GRAMS DAILY TO ANY AFFECTED JOINT OF THE LOWER EXTREMITIES. DO NOT EXCEED 8 GRAMS DAILY TO ANY AFFECTED JOINT OF THE UPPER EXTREMITIES. DO NOT EXCEED A TOTAL DOSE OF 32 GRAMS DAILY OVER ALL JOINTS)   Evolocumab (Repatha SureClick) 140 MG/ML SOAJ Not Taking  No No   Sig: Inject 1 mL (140 mg total) under the skin every 14 (fourteen) days   Patient not taking: Reported on 2024   albuterol (PROVENTIL HFA,VENTOLIN HFA) 90 mcg/act inhaler Past Week  Yes Yes   Sig: Inhale 2 puffs every 6 (six) hours as needed for wheezing   aspirin 81 mg chewable tablet 2024  Yes Yes   Sig: Chew 81 mg daily   atorvastatin (LIPITOR) 80 mg tablet 2024 Pharmacy (Specify) Yes Yes   Sig: Take 40 mg by mouth daily   clopidogrel (PLAVIX) 75 mg tablet 4/10/2024  Yes Yes   Sig: Take 75 mg by mouth daily   cyclobenzaprine (FLEXERIL) 10 mg tablet Past Month Pharmacy (Specify) Yes Yes   Sig: Take 10 mg by mouth 3 (three) times a day as needed for muscle spasms   divalproex sodium (DEPAKOTE ER) 500 mg 24 hr tablet 2024 Pharmacy (Specify), Family Member Yes Yes   Si mg daily at bedtime   ezetimibe (ZETIA) 10 mg tablet 2024 Pharmacy (Specify) Yes Yes   Sig: Take 5 mg by mouth daily   fenofibrate (TRICOR) 145 mg tablet Not Taking  No No   Sig: Take 1 tablet (145 mg total) by mouth daily   Patient  "not taking: Reported on 4/10/2024   isosorbide mononitrate (IMDUR) 30 mg 24 hr tablet 2024  No Yes   Sig: Take 1 tablet (30 mg total) by mouth daily   losartan (COZAAR) 100 MG tablet 2024 Pharmacy (Specify) No Yes   Sig: Take 1 tablet (100 mg total) by mouth daily Do not start before 2023.   Patient taking differently: Take 50 mg by mouth daily   methocarbamol (ROBAXIN) 750 mg tablet Not Taking  No No   Sig: Take 1 tablet (750 mg total) by mouth every 6 (six) hours as needed for muscle spasms   Patient not taking: Reported on 4/10/2024   metoprolol succinate (TOPROL-XL) 100 mg 24 hr tablet 4/10/2024 Pharmacy (Specify) Yes Yes   Sig: Take 50 mg by mouth daily   mirtazapine (REMERON) 15 mg tablet Not Taking  Yes No   Si.5 mg   Patient not taking: Reported on 4/10/2024   nicotine (NICODERM CQ) 21 mg/24 hr TD 24 hr patch 4/10/2024  No Yes   Sig: Place 1 patch on the skin over 24 hours every 24 hours   omeprazole (PriLOSEC) 20 mg delayed release capsule 2024  Yes Yes   Sig: Take 20 mg by mouth daily   ranolazine (RANEXA) 500 mg 12 hr tablet 4/10/2024  No Yes   Sig: Take 1 tablet (500 mg total) by mouth 2 (two) times a day   sertraline (ZOLOFT) 50 mg tablet 2024 Pharmacy (Specify) Yes Yes   Si mg daily   traZODone (DESYREL) 50 mg tablet 2024  Yes Yes   Sig: Take 1 tablet by mouth daily at bedtime      Facility-Administered Medications: None       OBJECTIVE:    Vital signs in last 24 hours:    Temp:  [98.8 °F (37.1 °C)-99.5 °F (37.5 °C)] 99 °F (37.2 °C)  HR:  [55-80] 68  Resp:  [16-18] 18  BP: (114-145)/(63-83) 130/74    No intake or output data in the 24 hours ending 24 1710     Mental Status Evaluation:    Appearance:   man,  age appropriate, casually dressed, adequate grooming   Behavior:  pleasant, calm, cooperative, forthcoming, minimizing at times   Speech:  fluent, average rate, rhythm, and volume   Mood:  \"Good\"   Affect:  bright  stable, appropriate, " mood-congruent   Thought Process:  perseverative, organized, linear, goal-directed, at times circumstantial   Thought Content:  paranoid and bizarre delusions, preoccupied, perseverative, distorted   Perceptual Disturbances: denies auditory or visual hallucinations when asked, does not appear internally preoccupied or responding to internal stimuli   Risk Potential: Suicidal ideation - None  Homicidal ideation - None  Potential for aggression - Yes, due to history of violence   Sensorium:  oriented to person, place, and time/date   Memory:  recent and remote memory grossly impaired   Consciousness:  alert and awake   Attention: attention span and concentration are age appropriate   Intellect: within normal limits   Insight:  impaired   Judgment: impaired   Gait/Station: normal gait/station   Motor Activity: no abnormal movements       Laboratory Results:   I have personally reviewed all pertinent laboratory/tests results.  Most Recent Labs:   Lab Results   Component Value Date    WBC 10.15 04/09/2024    RBC 5.29 04/09/2024    HGB 15.7 04/09/2024    HCT 46.5 04/09/2024     04/09/2024    RDW 13.4 04/09/2024    NEUTROABS 7.92 (H) 04/09/2024    SODIUM 140 04/09/2024    K 4.2 04/09/2024     04/09/2024    CO2 24 04/09/2024    BUN 12 04/09/2024    CREATININE 0.79 04/09/2024    GLUC 104 04/09/2024    GLUF 121 (H) 01/05/2024    CALCIUM 9.5 04/09/2024    AST 18 04/09/2024    ALT 19 04/09/2024    ALKPHOS 87 04/09/2024    TP 7.4 04/09/2024    ALB 4.6 04/09/2024    TBILI 0.46 04/09/2024    CHOLESTEROL 70 01/05/2024    HDL 41 01/05/2024    TRIG 153 (H) 01/05/2024    LDLCALC <0 (L) 01/05/2024    NONHDLC 111 09/28/2023    JUG1TJDBFFKG 2.960 03/08/2021    HGBA1C 5.6 03/22/2022     03/22/2022       Imaging Studies:   No results found.    Code Status: Level 1 - Full Code    Advance Directive and Living Will: <no information>    Patient Strengths/Assets: ability for insight, adapts well, average or above  intelligence, capable of independent living, cooperative, communication skills, compliant with medication, family ties, financial means, financially secure, general fund of knowledge, good past treatment response, good physical health, good support system, interpersonal skills, motivated, negotiates basic needs, patient is on a voluntary commitment, reasoning ability, resourceful, sense of humor, self reliant, special hobby/interest, supportive family/friends, well educated, work skills    Patient Barriers/Limitations: marital/family conflict, ongoing family psychosocial stressors, recent mood and behavioral change in the context of alcohol intoxication    Short Term Goals: improvement in insight    Long Term Goals: improved insight, acceptance of need for psychiatric medications, acceptance of need for psychiatric treatment, acceptance of need for psychiatric follow up after discharge, acceptance of psychiatric diagnosis, appropriate interaction with family, refrain from excessive alcohol use    Risks / Benefits of Treatment:    Risks, benefits, and possible side effects of medications explained to patient and patient verbalizes understanding and agreement for treatment.    Counseling / Coordination of Care:    Total floor / unit time spent today 60 minutes. Greater than 50% of total time was spent with the patient and / or family counseling and / or coordination of care. A description of the counseling / coordination of care:   Patient's presentation on admission and proposed treatment plan discussed with treatment team.  Diagnosis, medication changes and treatment plan reviewed with patient.    Inpatient Psychiatric Certification:    Estimated length of stay: 7 midnights      Annetta Selby DO 04/11/24   Attestation signed by Kapil Reynolds MD at 4/11/2024  7:59 PM:  RESIDENT ATTESTATION - I have reviewed all components of the note performed and documented by the Resident. I interviewed, took the history,  personally performed all the required components and examined the patient on 04/11/24. I discussed the case with the Resident and reviewed the Resident’s note, prescribed medications, and orders placed. I supervised the Resident and I was available for discussion. I agree with the Resident management plan as it was presented to me. I attest that this information is true, accurate and complete to the best of my knowledge.    Laboratory Results: I have personally reviewed all pertinent laboratory/tests results    Most Recent Labs:   Lab Results   Component Value Date    WBC 10.15 04/09/2024    RBC 5.29 04/09/2024    HGB 15.7 04/09/2024    HCT 46.5 04/09/2024     04/09/2024    RDW 13.4 04/09/2024    NEUTROABS 7.92 (H) 04/09/2024    SODIUM 140 04/09/2024    K 4.2 04/09/2024     04/09/2024    CO2 24 04/09/2024    BUN 12 04/09/2024    CREATININE 0.79 04/09/2024    GLUC 104 04/09/2024    CALCIUM 9.5 04/09/2024    AST 18 04/09/2024    ALT 19 04/09/2024    ALKPHOS 87 04/09/2024    TP 7.4 04/09/2024    ALB 4.6 04/09/2024    TBILI 0.46 04/09/2024    CHOLESTEROL 70 01/05/2024    HDL 41 01/05/2024    TRIG 153 (H) 01/05/2024    LDLCALC <0 (L) 01/05/2024    NONHDLC 111 09/28/2023    [id number] 2.960 03/08/2021    HGBA1C 5.6 03/22/2022     03/22/2022       Assessment/Plan  Principal Problem:    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)  Active Problems:    Chronic obstructive pulmonary disease (HCC)    Smoker    Osteoarthritis    Gastroesophageal reflux disease    Chronic ischemic heart disease    Daily Cannabis Use    Coronary artery disease involving native coronary artery of native heart without angina pectoris    Hypertension    Hypertriglyceridemia    Overweight with body mass index (BMI) 25.0-29.9    DDD (degenerative disc disease), cervical    Cardiomyopathy, unspecified type (HCC)    Aneurysm of ascending aorta without rupture (HCC)    Atherosclerosis of native  coronary artery of native heart with angina pectoris (HCC)    Cocaine use    Alcohol-induced mood episode    Plan:   Treatment plan, treatment progress and medication changes were reviewed with nursing staff, Pharmacy Service and Case Management in Treatment Team meeting  Treatment plan and proposed medication changes discussed with patient.  Patient's diagnosis reviewed with patient.  Risks and benefits and possible side effects of medications discussed with patient. Patient verbalizes understanding and agreement for treatment.    Kapil Reynolds MD 24     Soha Santiago PA-C  2024 12:45 PM  Attested                                Chris Dowd  :1962 M  MRN:[id number]    CSN:[id number]  Adm Date: 4/10/2024 160  4:02 PM   ATT PHY: Kapil Reynolds Md  [facility]         Chief Complaint: suicidal threats      History of Presenting Illness: Chris Dowd is a(n) 61 y.o. year old male who is admitted to [facility] on voluntary 201 commitment basis.  Patient originally presented to [facility] ED on 24 with 302 petitioned by patient's daughter for bizarre behavior, suicidal threats.    Patient examined at bedside.  Patient currently denies any physical symptoms.  He is on Praulent inj every 2 weeks and states he is due for his injection today.     Allergies   Allergen Reactions    Latex Rash and Swelling     Current Facility-Administered Medications on File Prior to Encounter   Medication Dose Route Frequency Provider Last Rate Last Admin    [DISCONTINUED] albuterol (PROVENTIL HFA,VENTOLIN HFA) inhaler 2 puff  2 puff Inhalation Q6H PRN Jadiel Dalal DO        [DISCONTINUED] Alirocumab SOAJ 75 mg  75 mg Injection Q14 Days Jadiel Dalal DO        [DISCONTINUED] aspirin chewable tablet 81 mg  81 mg Oral Daily Jadiel Dalal DO   81 mg at 04/10/24 0800    [DISCONTINUED] atorvastatin (LIPITOR) tablet 80 mg  80 mg Oral Daily Jadiel Dalal DO    80 mg at 04/10/24 0800    [DISCONTINUED] clopidogrel (PLAVIX) tablet 75 mg  75 mg Oral Daily Jadiel R Mulugeta, DO   75 mg at 04/10/24 0800    [DISCONTINUED] divalproex sodium (DEPAKOTE ER) 24 hr tablet 500 mg  500 mg Oral Daily aJdiel R Mulugeta, DO   500 mg at 04/10/24 0800    [DISCONTINUED] ezetimibe (ZETIA) tablet 10 mg  10 mg Oral Daily Jadiel R Mulugeta, DO   10 mg at 04/10/24 0800    [DISCONTINUED] fenofibrate (TRICOR) tablet 145 mg  145 mg Oral Daily Jadiel R Mulugeta, DO   145 mg at 04/10/24 0847    [DISCONTINUED] isosorbide mononitrate (IMDUR) 24 hr tablet 30 mg  30 mg Oral Daily Jadiel Dalal, DO   30 mg at 04/10/24 0800    [DISCONTINUED] losartan (COZAAR) tablet 100 mg  100 mg Oral Daily Jadiel Dalal, DO        [DISCONTINUED] metoprolol succinate (TOPROL-XL) 24 hr tablet 100 mg  100 mg Oral Daily Jadiel Dalal, DO   100 mg at 04/10/24 0846    [DISCONTINUED] mirtazapine (REMERON) tablet 7.5 mg  7.5 mg Oral HS Jadiel Dalal DO        [DISCONTINUED] nicotine (NICODERM CQ) 21 mg/24 hr TD 24 hr patch 1 patch  1 patch Transdermal Q24H Jadiel Dalal DO   1 patch at 04/10/24 0755    [DISCONTINUED] OLANZapine (ZyPREXA) IM injection 5 mg  5 mg Intramuscular Q6H PRN Max 3/day Reuben Sánchez PA-C        [DISCONTINUED] pantoprazole (PROTONIX) EC tablet 20 mg  20 mg Oral Early Morning Jadiel Dalal, DO   20 mg at 04/10/24 0756    [DISCONTINUED] ranolazine (RANEXA) 12 hr tablet 500 mg  500 mg Oral BID Jadiel Dalal, DO   500 mg at 04/10/24 0847    [DISCONTINUED] sertraline (ZOLOFT) tablet 25 mg  25 mg Oral Daily Jadiel Dalal, DO   25 mg at 04/10/24 0800    [DISCONTINUED] traZODone (DESYREL) tablet 50 mg  50 mg Oral HS Jadiel Dalal DO         Current Outpatient Medications on File Prior to Encounter   Medication Sig Dispense Refill    albuterol (PROVENTIL HFA,VENTOLIN HFA) 90 mcg/act inhaler Inhale 2 puffs every 6 (six) hours as needed for wheezing      Alirocumab 75 MG/ML SOAJ Inject 75 mg as directed every 14  (fourteen) days      aspirin 81 mg chewable tablet Chew 81 mg daily      atorvastatin (LIPITOR) 80 mg tablet Take 40 mg by mouth daily      clopidogrel (PLAVIX) 75 mg tablet Take 75 mg by mouth daily      cyclobenzaprine (FLEXERIL) 10 mg tablet Take 10 mg by mouth 3 (three) times a day as needed for muscle spasms      divalproex sodium (DEPAKOTE ER) 500 mg 24 hr tablet 500 mg daily at bedtime      ezetimibe (ZETIA) 10 mg tablet Take 5 mg by mouth daily      isosorbide mononitrate (IMDUR) 30 mg 24 hr tablet Take 1 tablet (30 mg total) by mouth daily 90 tablet 3    losartan (COZAAR) 100 MG tablet Take 1 tablet (100 mg total) by mouth daily Do not start before September 29, 2023. (Patient taking differently: Take 50 mg by mouth daily)  0    metoprolol succinate (TOPROL-XL) 100 mg 24 hr tablet Take 50 mg by mouth daily      nicotine (NICODERM CQ) 21 mg/24 hr TD 24 hr patch Place 1 patch on the skin over 24 hours every 24 hours 28 patch 3    omeprazole (PriLOSEC) 20 mg delayed release capsule Take 20 mg by mouth daily      ranolazine (RANEXA) 500 mg 12 hr tablet Take 1 tablet (500 mg total) by mouth 2 (two) times a day 180 tablet 3    sertraline (ZOLOFT) 50 mg tablet 150 mg daily      traZODone (DESYREL) 50 mg tablet Take 1 tablet by mouth daily at bedtime      Diclofenac Sodium (VOLTAREN) 1 % APPLY 4GM TO LOWER EXTREMITIES TOPICALLY TWICE A DAY FOR PAIN AND INFLAMMATION **USE DOSING CARD** (DO NOT EXCEED 16 GRAMS DAILY TO ANY AFFECTED JOINT OF THE LOWER EXTREMITIES. DO NOT EXCEED 8 GRAMS DAILY TO ANY AFFECTED JOINT OF THE UPPER EXTREMITIES. DO NOT EXCEED A TOTAL DOSE OF 32 GRAMS DAILY OVER ALL JOINTS)      Evolocumab (Repatha SureClick) 140 MG/ML SOAJ Inject 1 mL (140 mg total) under the skin every 14 (fourteen) days (Patient not taking: Reported on 1/8/2024) 1 mL 12    fenofibrate (TRICOR) 145 mg tablet Take 1 tablet (145 mg total) by mouth daily (Patient not taking: Reported on 4/10/2024) 30 tablet 5    methocarbamol  (ROBAXIN) 750 mg tablet Take 1 tablet (750 mg total) by mouth every 6 (six) hours as needed for muscle spasms (Patient not taking: Reported on 4/10/2024) 30 tablet 0    mirtazapine (REMERON) 15 mg tablet 7.5 mg (Patient not taking: Reported on 4/10/2024)       Active Ambulatory Problems     Diagnosis Date Noted    Chronic obstructive pulmonary disease (HCC) 03/02/2021    Smoker 03/02/2021    Osteoarthritis 03/02/2021    Panic disorder 03/02/2021    Gastroesophageal reflux disease 03/02/2021    Chronic ischemic heart disease 03/02/2021    Cannabis abuse 03/02/2021    Essential thrombocythemia (HCC) 03/02/2021    Abnormal gait 03/02/2021    Anxiety state 03/02/2021    Coronary artery disease involving native coronary artery of native heart without angina pectoris 12/26/2017    Nondependent cocaine abuse ([facility]) 03/02/2021    Hypertension 12/26/2017    Hypertriglyceridemia 12/26/2017    Overweight with body mass index (BMI) 25.0-29.9 03/02/2021    Solitary lung nodule 03/24/2021    DDD (degenerative disc disease), cervical 10/01/2021    Cardiomyopathy, unspecified type ([facility]) 03/22/2022    Aneurysm of ascending aorta without rupture ([facility]) 01/08/2024    Atherosclerosis of native coronary artery of native heart with angina pectoris ([facility]) 01/08/2024    Major depressive disorder, recurrent, in full remission ([facility]) 04/11/2024     Resolved Ambulatory Problems     Diagnosis Date Noted    No Resolved Ambulatory Problems     Past Medical History:   Diagnosis Date    CAD (coronary artery disease)     COPD (chronic obstructive pulmonary disease) ([facility])     GERD (gastroesophageal reflux disease)     Hyperlipidemia     Medical marijuana use     Myocardial infarction ([facility])      Past Surgical History:   Procedure Laterality Date    ANGIOPLASTY      CARDIAC CATHETERIZATION N/A 11/26/2021    Procedure: Cardiac catheterization with [facility];  Surgeon: Panchito Fatima MD;  Location: [facility];  Service: Cardiology    CARDIAC  CATHETERIZATION N/A 2021    Procedure: Cardiac Coronary Angiogram;  Surgeon: Panchito Fatima MD;  Location: [facility];  Service: Cardiology    CARDIAC CATHETERIZATION N/A 2021    Procedure: Cardiac pci;  Surgeon: Panchito Fatima MD;  Location: [facility];  Service: Cardiology    CARDIAC CATHETERIZATION Left 2023    Procedure: Cardiac Left Heart Cath;  Surgeon: Panchito Fatima MD;  Location: [facility];  Service: Cardiology    CARDIAC CATHETERIZATION N/A 2023    Procedure: Cardiac pci;  Surgeon: Panchito Fatima MD;  Location: [facility]ARD[facility];  Service: Cardiology    CAROTID STENT      REPLACEMENT TOTAL KNEE BILATERAL Bilateral     THUMB FUSION Right      Social History:   Social History     Socioeconomic History    Marital status: /Civil Union     Spouse name: None    Number of children: None    Years of education: None    Highest education level: None   Occupational History    None   Tobacco Use    Smoking status: Former     Current packs/day: 0.00     Average packs/day: 1 pack/day for 38.0 years (38.0 ttl pk-yrs)     Types: Cigarettes     Start date: 1985     Quit date: 2023     Years since quittin.5    Smokeless tobacco: Never   Vaping Use    Vaping status: Never Used   Substance and Sexual Activity    Alcohol use: Yes    Drug use: Yes     Types: Marijuana    Sexual activity: Yes     Partners: Female     Birth control/protection: None   Other Topics Concern    None   Social History Narrative    None     Social Determinants of Health     Financial Resource Strain: Not on file   Food Insecurity: Not on file   Transportation Needs: Not on file   Physical Activity: Not on file   Stress: Not on file   Social Connections: Not on file   Intimate Partner Violence: Not on file   Housing Stability: Not on file     Family History:   Family History   Problem Relation Age of Onset    Heart disease Mother     Heart disease Father     Heart attack Father      "Lung cancer Sister     Diabetes Brother      Review of Systems   Constitutional:  Negative for chills and fever.   HENT:  Negative for ear pain and sore throat.    Eyes:  Negative for pain and visual disturbance.   Respiratory:  Negative for cough and shortness of breath.    Cardiovascular:  Negative for chest pain and palpitations.   Gastrointestinal:  Negative for abdominal pain, constipation, diarrhea, nausea and vomiting.   Genitourinary:  Negative for difficulty urinating, dysuria and frequency.   Musculoskeletal:  Negative for arthralgias and back pain.   Skin:  Negative for color change and rash.   Neurological:  Negative for dizziness and headaches.   All other systems reviewed and are negative.    Physical Exam   Vitals: Blood pressure 114/63, pulse 62, temperature 99.5 °F (37.5 °C), temperature source Temporal, resp. rate 16, height 6' 0.99\" (1.854 m), weight 86.2 kg (190 lb), SpO2 97%.,Body mass index is 25.07 kg/m².  Constitutional: Awake, alert, in no acute distress.  Head: Normocephalic and atraumatic.   Mouth/Throat: Oropharynx is clear and moist.    Eyes: Conjunctivae and EOM are normal.   Neck: Neck supple. No thyromegaly present.   Cardiovascular: Normal rate, regular rhythm.  Pulmonary/Chest: Effort normal and breath sounds normal.   Abdominal: Soft. Bowel sounds are normal. There is no tenderness.   Neurological: No focal deficits.   Skin: Skin is warm and dry.     Assessment    Chris Dowd is a(n) 61 y.o. male with mood disorder.    Cardiac with hx of HTN, HLD, CAD, cardiomyopathy.  Continue home Praluent 75 mg every 14 days (due today 4/11/24), aspirin 81 mg daily, Plavix 75 mg daily, Zetia 10 mg daily, Imdur 30 mg daily, losartan 100 mg daily, Toprol- mg daily, Ranexa 500 mg twice daily.  Cardiac diet.  Pt follows with [facility] in [place].  GERD.  Continue Protonix 20 mg daily.  DJD/OA.  Tylenol as needed.  COPD.  Stable.  Albuterol inhaler as needed.  Tobacco abuse.  " NRT.  Psych with mood disorder.  ThIs is being managed by the psych team.      Prognosis: Fair.    Discharge Plan: In progress.    Advanced Directives: I have discussed in detail with the patient the advanced directives. The patient reports his POA is his wife, Ayleen Dowd, whose number is [phone].  Patient states he does have a living will with advanced healthcare directives. When discussing cardiac and pulmonary resuscitation efforts with the patient, the patient wishes to be full code.    I have spent more than 50 minutes gathering data, doing physical examination, and discussing the advanced directives, which was witnessed by caring staff.    The patient was discussed with Dr. Price and he is in agreement with the above note.  Attestation signed by Dirk Price MD at 4/11/2024  3:42 PM:  I have personally discussed this patient with my PA-C and I am in agreement with the plan below.

## 2024-05-09 RX ORDER — LOSARTAN POTASSIUM 25 MG/1
25 TABLET ORAL DAILY
Qty: 90 TABLET | Refills: 1 | Status: SHIPPED | OUTPATIENT
Start: 2024-05-09

## 2024-05-09 RX ORDER — ROSUVASTATIN CALCIUM 40 MG/1
40 TABLET, COATED ORAL DAILY
Qty: 90 TABLET | Refills: 1 | Status: SHIPPED | OUTPATIENT
Start: 2024-05-09

## 2024-05-16 ENCOUNTER — OFFICE VISIT (OUTPATIENT)
Age: 89
End: 2024-05-16
Payer: MEDICARE

## 2024-05-16 VITALS
HEART RATE: 60 BPM | DIASTOLIC BLOOD PRESSURE: 78 MMHG | WEIGHT: 154 LBS | OXYGEN SATURATION: 96 % | SYSTOLIC BLOOD PRESSURE: 136 MMHG | TEMPERATURE: 98.2 F | BODY MASS INDEX: 25.66 KG/M2 | HEIGHT: 65 IN

## 2024-05-16 DIAGNOSIS — R05.8 POST-VIRAL COUGH SYNDROME: Primary | ICD-10-CM

## 2024-05-16 PROCEDURE — 1090F PRES/ABSN URINE INCON ASSESS: CPT | Performed by: INTERNAL MEDICINE

## 2024-05-16 PROCEDURE — G8419 CALC BMI OUT NRM PARAM NOF/U: HCPCS | Performed by: INTERNAL MEDICINE

## 2024-05-16 PROCEDURE — G8428 CUR MEDS NOT DOCUMENT: HCPCS | Performed by: INTERNAL MEDICINE

## 2024-05-16 PROCEDURE — 99213 OFFICE O/P EST LOW 20 MIN: CPT | Performed by: INTERNAL MEDICINE

## 2024-05-16 PROCEDURE — 1123F ACP DISCUSS/DSCN MKR DOCD: CPT | Performed by: INTERNAL MEDICINE

## 2024-05-16 PROCEDURE — 1036F TOBACCO NON-USER: CPT | Performed by: INTERNAL MEDICINE

## 2024-05-16 RX ORDER — DOXYCYCLINE 100 MG/1
100 CAPSULE ORAL 2 TIMES DAILY
COMMUNITY
Start: 2024-05-06

## 2024-05-16 RX ORDER — TICAGRELOR 90 MG/1
90 TABLET ORAL 2 TIMES DAILY
COMMUNITY
Start: 2024-02-16

## 2024-05-16 RX ORDER — ALBUTEROL SULFATE 90 UG/1
2 AEROSOL, METERED RESPIRATORY (INHALATION) EVERY 6 HOURS PRN
Qty: 1 EACH | Refills: 3 | Status: SHIPPED | OUTPATIENT
Start: 2024-05-16

## 2024-05-16 RX ORDER — DEXTROMETHORPHAN HYDROBROMIDE AND PROMETHAZINE HYDROCHLORIDE 15; 6.25 MG/5ML; MG/5ML
SYRUP ORAL
COMMUNITY
Start: 2024-05-06

## 2024-05-16 ASSESSMENT — PATIENT HEALTH QUESTIONNAIRE - PHQ9
SUM OF ALL RESPONSES TO PHQ QUESTIONS 1-9: 0
SUM OF ALL RESPONSES TO PHQ QUESTIONS 1-9: 0
SUM OF ALL RESPONSES TO PHQ9 QUESTIONS 1 & 2: 0
SUM OF ALL RESPONSES TO PHQ QUESTIONS 1-9: 0
1. LITTLE INTEREST OR PLEASURE IN DOING THINGS: NOT AT ALL
2. FEELING DOWN, DEPRESSED OR HOPELESS: NOT AT ALL
SUM OF ALL RESPONSES TO PHQ QUESTIONS 1-9: 0

## 2024-05-16 ASSESSMENT — ENCOUNTER SYMPTOMS
SHORTNESS OF BREATH: 0
COUGH: 0

## 2024-05-16 NOTE — PROGRESS NOTES
Identified pt with two pt identifiers(name and ). Reviewed record in preparation for visit and have obtained necessary documentation. All patient medications has been reviewed.  Chief Complaint   Patient presents with    Follow-up       Vitals:    24 1054   BP: 136/78   Pulse: 60   Temp: 98.2 °F (36.8 °C)   SpO2: 96%                   Coordination of Care Questionnaire:   1) Have you been to an emergency room, urgent care, or hospitalized since your last visit?  Seen at [facility] for bronchitis.     2. Have seen or consulted any other health care provider since your last visit? no

## 2024-05-16 NOTE — PROGRESS NOTES
Kristen Smith is a 88 y.o. female  Chief Complaint   Patient presents with    Follow-up       Vitals:    05/16/24 1054   BP: 136/78   Pulse: 60   Temp: 98.2 °F (36.8 °C)   SpO2: 96%          HPI  Ms.Sylvia RAJ Smith presents with 2 weeks of dry cough and post nasal drainage. The cough was worse at night, she went to urgent care and was prescribed with doxycycline 100 bid for 10 days and completed treatment. She still has dry cough and doesn't feel ok.     Past Medical History:   Diagnosis Date    CAD (coronary artery disease)     s/p cabg, dr lugo 2000 x4 vessel disease    Hypercholesterolemia     Hypertension     Hyperthyroidism     1990's, dr gilmore    Osteoporosis             ROS  Review of Systems   Constitutional:  Negative for fever.   Respiratory:  Negative for cough and shortness of breath.    Cardiovascular:  Negative for chest pain and palpitations.   Neurological:  Negative for headaches.   Psychiatric/Behavioral:  Negative for dysphoric mood.            EXAM  Physical Exam  Vitals and nursing note reviewed.   HENT:      Head: Normocephalic and atraumatic.   Cardiovascular:      Rate and Rhythm: Normal rate and regular rhythm.      Pulses: Normal pulses.      Heart sounds: Normal heart sounds. No murmur heard.  Pulmonary:      Effort: Pulmonary effort is normal. No respiratory distress.      Breath sounds: Normal breath sounds.   Musculoskeletal:      Right lower leg: No edema.      Left lower leg: No edema.   Neurological:      Mental Status: She is alert.   Psychiatric:         Mood and Affect: Mood normal.         Behavior: Behavior normal.         Thought Content: Thought content normal.         Judgment: Judgment normal.         Health Maintenance Due   Topic Date Due    DTaP/Tdap/Td vaccine (1 - Tdap) Never done    Respiratory Syncytial Virus (RSV) Pregnant or age 60 yrs+ (1 - 1-dose 60+ series) Never done    Shingles vaccine (2 of 3) 10/27/2010    Lipids  01/27/2023    COVID-19 Vaccine (4 -

## 2024-05-22 RX ORDER — RIVAROXABAN 10 MG/1
10 TABLET, FILM COATED ORAL
Qty: 90 TABLET | Refills: 3 | Status: SHIPPED | OUTPATIENT
Start: 2024-05-22

## 2024-06-03 RX ORDER — ESTRADIOL 10 UG/1
TABLET VAGINAL
Qty: 24 TABLET | Refills: 0 | Status: SHIPPED | OUTPATIENT
Start: 2024-06-03

## 2024-06-25 RX ORDER — EZETIMIBE 10 MG/1
TABLET ORAL
Qty: 90 TABLET | Refills: 3 | Status: SHIPPED | OUTPATIENT
Start: 2024-06-25

## 2024-06-28 ENCOUNTER — TELEPHONE (OUTPATIENT)
Age: 89
End: 2024-06-28

## 2024-06-28 NOTE — TELEPHONE ENCOUNTER
Per patient appointment request. Patient would like to be seen for a follow-up. Left voicemail to call back.

## 2024-07-16 ENCOUNTER — OFFICE VISIT (OUTPATIENT)
Age: 89
End: 2024-07-16
Payer: MEDICARE

## 2024-07-16 VITALS
DIASTOLIC BLOOD PRESSURE: 64 MMHG | HEART RATE: 63 BPM | WEIGHT: 155 LBS | SYSTOLIC BLOOD PRESSURE: 122 MMHG | HEIGHT: 65 IN | OXYGEN SATURATION: 94 % | TEMPERATURE: 98.7 F | BODY MASS INDEX: 25.83 KG/M2 | RESPIRATION RATE: 18 BRPM

## 2024-07-16 DIAGNOSIS — Z79.01 ANTICOAGULATED: ICD-10-CM

## 2024-07-16 DIAGNOSIS — I10 ESSENTIAL (PRIMARY) HYPERTENSION: ICD-10-CM

## 2024-07-16 DIAGNOSIS — M25.512 CHRONIC LEFT SHOULDER PAIN: ICD-10-CM

## 2024-07-16 DIAGNOSIS — Z79.01 CHRONIC ANTICOAGULATION: ICD-10-CM

## 2024-07-16 DIAGNOSIS — Z00.00 MEDICARE ANNUAL WELLNESS VISIT, SUBSEQUENT: ICD-10-CM

## 2024-07-16 DIAGNOSIS — H90.3 SENSORINEURAL HEARING LOSS (SNHL) OF BOTH EARS: Primary | ICD-10-CM

## 2024-07-16 DIAGNOSIS — G89.29 CHRONIC LEFT SHOULDER PAIN: ICD-10-CM

## 2024-07-16 DIAGNOSIS — I25.84 CORONARY ARTERY DISEASE DUE TO CALCIFIED CORONARY LESION: ICD-10-CM

## 2024-07-16 DIAGNOSIS — I25.10 CORONARY ARTERY DISEASE DUE TO CALCIFIED CORONARY LESION: ICD-10-CM

## 2024-07-16 PROCEDURE — 1123F ACP DISCUSS/DSCN MKR DOCD: CPT | Performed by: INTERNAL MEDICINE

## 2024-07-16 PROCEDURE — G8427 DOCREV CUR MEDS BY ELIG CLIN: HCPCS | Performed by: INTERNAL MEDICINE

## 2024-07-16 PROCEDURE — G8419 CALC BMI OUT NRM PARAM NOF/U: HCPCS | Performed by: INTERNAL MEDICINE

## 2024-07-16 PROCEDURE — 1036F TOBACCO NON-USER: CPT | Performed by: INTERNAL MEDICINE

## 2024-07-16 PROCEDURE — 1090F PRES/ABSN URINE INCON ASSESS: CPT | Performed by: INTERNAL MEDICINE

## 2024-07-16 PROCEDURE — G0439 PPPS, SUBSEQ VISIT: HCPCS | Performed by: INTERNAL MEDICINE

## 2024-07-16 PROCEDURE — 99214 OFFICE O/P EST MOD 30 MIN: CPT | Performed by: INTERNAL MEDICINE

## 2024-07-16 RX ORDER — CLOPIDOGREL BISULFATE 75 MG/1
75 TABLET ORAL DAILY
COMMUNITY
Start: 2024-07-02

## 2024-07-16 RX ORDER — ALENDRONATE SODIUM 70 MG/1
70 TABLET ORAL
COMMUNITY

## 2024-07-16 SDOH — ECONOMIC STABILITY: INCOME INSECURITY: HOW HARD IS IT FOR YOU TO PAY FOR THE VERY BASICS LIKE FOOD, HOUSING, MEDICAL CARE, AND HEATING?: NOT HARD AT ALL

## 2024-07-16 SDOH — ECONOMIC STABILITY: FOOD INSECURITY: WITHIN THE PAST 12 MONTHS, THE FOOD YOU BOUGHT JUST DIDN'T LAST AND YOU DIDN'T HAVE MONEY TO GET MORE.: NEVER TRUE

## 2024-07-16 SDOH — ECONOMIC STABILITY: FOOD INSECURITY: WITHIN THE PAST 12 MONTHS, YOU WORRIED THAT YOUR FOOD WOULD RUN OUT BEFORE YOU GOT MONEY TO BUY MORE.: NEVER TRUE

## 2024-07-16 ASSESSMENT — PATIENT HEALTH QUESTIONNAIRE - PHQ9
SUM OF ALL RESPONSES TO PHQ9 QUESTIONS 1 & 2: 0
1. LITTLE INTEREST OR PLEASURE IN DOING THINGS: NOT AT ALL
SUM OF ALL RESPONSES TO PHQ QUESTIONS 1-9: 0
SUM OF ALL RESPONSES TO PHQ QUESTIONS 1-9: 0
2. FEELING DOWN, DEPRESSED OR HOPELESS: NOT AT ALL
SUM OF ALL RESPONSES TO PHQ QUESTIONS 1-9: 0
SUM OF ALL RESPONSES TO PHQ QUESTIONS 1-9: 0

## 2024-07-16 NOTE — PROGRESS NOTES
Kristen Smith is a 88 y.o. female presenting for Annual Exam      /64 (Site: Left Upper Arm, Position: Sitting, Cuff Size: Large Adult)   Pulse 63   Temp 98.7 °F (37.1 °C) (Temporal)   Resp 18   Ht 1.651 m (5' 5\")   Wt 70.3 kg (155 lb)   SpO2 94%   BMI 25.79 kg/m²       Current Outpatient Medications   Medication Sig Dispense Refill    clopidogrel (PLAVIX) 75 MG tablet Take 1 tablet by mouth daily      alendronate (FOSAMAX) 70 MG tablet Take 1 tablet by mouth every 7 days      ezetimibe (ZETIA) 10 MG tablet TAKE 1 TABLET DAILY 90 tablet 3    YUVAFEM 10 MCG TABS vaginal tablet USE 1 TABLET VAGINALLY ON TUESDAYS AND FRIDAYS 24 tablet 0    XARELTO 10 MG TABS tablet TAKE 1 TABLET DAILY WITH BREAKFAST 90 tablet 3    metoprolol tartrate (LOPRESSOR) 25 MG tablet Take 1 tablet by mouth 2 times daily      rosuvastatin (CRESTOR) 40 MG tablet Take 1 tablet by mouth daily 90 tablet 1    losartan (COZAAR) 25 MG tablet Take 1 tablet by mouth daily 90 tablet 1    ranolazine (RANEXA) 500 MG extended release tablet Take 1 tablet by mouth daily 60 tablet 5    acetaminophen (TYLENOL) 500 MG tablet Take 2 tablets by mouth every 6 hours as needed      bumetanide (BUMEX) 1 MG tablet Take 1 tablet by mouth daily      isosorbide mononitrate (IMDUR) 60 MG extended release tablet Take 1 tablet by mouth every morning      methIMAzole (TAPAZOLE) 5 MG tablet ceived the following from [facility]: Outside name: methIMAzole (TAPAZOLE) 5 mg tablet      nitroGLYCERIN (NITROSTAT) 0.4 MG SL tablet DISSOLVE 1 TABLET UNDER TONGUE EVERY 5 MINUTES AS NEEDED FOR CHEST PAIN      doxycycline monohydrate (MONODOX) 100 MG capsule Take 1 capsule by mouth 2 times daily (Patient not taking: Reported on 7/16/2024)      promethazine-dextromethorphan (PROMETHAZINE-DM) 6.25-15 MG/5ML syrup TAKE 5-10ML BY MOUTH AT BEDTIME AS NEEDED FOR COUGH (Patient not taking: Reported on 7/16/2024)      BRILINTA 90 MG TABS tablet Take 1 tablet by 
Jose Gaming MD   acetaminophen (TYLENOL) 500 MG tablet Take 2 tablets by mouth every 6 hours as needed Yes Automatic Reconciliation, Ar   bumetanide (BUMEX) 1 MG tablet Take 1 tablet by mouth daily Yes Automatic Reconciliation, Ar   isosorbide mononitrate (IMDUR) 60 MG extended release tablet Take 1 tablet by mouth every morning Yes Automatic Reconciliation, Ar   methIMAzole (TAPAZOLE) 5 MG tablet ceived the following from [facility]: Outside name: methIMAzole (TAPAZOLE) 5 mg tablet Yes Automatic Reconciliation, Ar   nitroGLYCERIN (NITROSTAT) 0.4 MG SL tablet DISSOLVE 1 TABLET UNDER TONGUE EVERY 5 MINUTES AS NEEDED FOR CHEST PAIN Yes Automatic Reconciliation, Ar   promethazine-dextromethorphan (PROMETHAZINE-DM) 6.25-15 MG/5ML syrup TAKE 5-10ML BY MOUTH AT BEDTIME AS NEEDED FOR COUGH  Patient not taking: Reported on 7/16/2024  Provider, Historical, MD       [facility] (Including outside providers/suppliers regularly involved in providing care):   Patient Care Team:  Jose Gaming MD as PCP - General  Jose Gaming MD as PCP - [facility]      Reviewed and updated this visit:  Tobacco  Allergies  Meds  Med Hx  Surg Hx  Soc Hx  Fam Hx          Kristen was seen today for medicare awv.    Diagnoses and all orders for this visit:    Sensorineural hearing loss (SNHL) of both ears  -     [place] OFFICE OUTPATIENT VISIT 25 MINUTES [94292]    Chronic anticoagulation  -     [place] OFFICE OUTPATIENT VISIT 25 MINUTES [[id number]]    Coronary artery disease due to calcified coronary lesion  -     [place] OFFICE OUTPATIENT VISIT 25 MINUTES [[id number]]    Essential (primary) hypertension  -     [place] OFFICE OUTPATIENT VISIT 25 MINUTES [[id number]]    Medicare annual wellness visit, subsequent    Chronic left shoulder pain  -     Ambulatory referral to Physical Therapy    Anticoagulated      Stop xarelto  Hypertension controlled for age based on guidelines

## 2024-07-16 NOTE — PATIENT INSTRUCTIONS

## 2024-08-29 ENCOUNTER — OFFICE VISIT (OUTPATIENT)
Age: 89
End: 2024-08-29
Payer: MEDICARE

## 2024-08-29 VITALS
BODY MASS INDEX: 25.26 KG/M2 | TEMPERATURE: 98.7 F | RESPIRATION RATE: 20 BRPM | HEART RATE: 73 BPM | OXYGEN SATURATION: 93 % | WEIGHT: 151.8 LBS | SYSTOLIC BLOOD PRESSURE: 109 MMHG | DIASTOLIC BLOOD PRESSURE: 58 MMHG

## 2024-08-29 DIAGNOSIS — G89.29 CHRONIC LEFT SHOULDER PAIN: ICD-10-CM

## 2024-08-29 DIAGNOSIS — I25.810 CORONARY ARTERY DISEASE INVOLVING AUTOLOGOUS ARTERY CORONARY BYPASS GRAFT WITHOUT ANGINA PECTORIS: ICD-10-CM

## 2024-08-29 DIAGNOSIS — Z09 HOSPITAL DISCHARGE FOLLOW-UP: ICD-10-CM

## 2024-08-29 DIAGNOSIS — R07.89 ATYPICAL CHEST PAIN: ICD-10-CM

## 2024-08-29 DIAGNOSIS — R10.11 ABDOMINAL PAIN, RUQ: Primary | ICD-10-CM

## 2024-08-29 DIAGNOSIS — M25.512 CHRONIC LEFT SHOULDER PAIN: ICD-10-CM

## 2024-08-29 PROCEDURE — 99214 OFFICE O/P EST MOD 30 MIN: CPT | Performed by: INTERNAL MEDICINE

## 2024-08-29 NOTE — PROGRESS NOTES
I have reviewed all needed documentation in preparation for visit. Verified patient by name and date of birth    Chief Complaint   Patient presents with    Follow-Up from Hospital     Pt has discharge paperwork, [facility], 8/18/24 admitted, for chest pain    Shortness of Breath     Pt reports often getting short of breath.  Ie, when speaking, pt needs to stop to catch breath       \"Have you been to the ER, urgent care clinic since your last visit?  Hospitalized since your last visit?\"    YES - When: approximately 1  weeks ago.  Where and Why: [facility] ER, chest pain.    “Have you seen or consulted any other health care providers outside of [facility] since your last visit?”    YES - When: approximately 1  weeks ago.  Where and Why: Cardiologist, [place] [facility].            Click Here for Release of Records Request    Vitals:    08/29/24 1120   BP: (!) 109/58   Site: Right Upper Arm   Position: Sitting   Cuff Size: Medium Adult   Pulse: 73   Resp: 18   Temp: 98.7 °F (37.1 °C)   TempSrc: Temporal   SpO2: 93%   Weight: 68.9 kg (151 lb 12.8 oz)       Health Maintenance Due   Topic Date Due    DTaP/Tdap/Td vaccine (1 - Tdap) Never done    Respiratory Syncytial Virus (RSV) Pregnant or age 60 yrs+ (1 - 1-dose 60+ series) Never done    Shingles vaccine (2 of 3) 10/27/2010    Lipids  01/27/2023    COVID-19 Vaccine (2 - 2023-24 season) 09/01/2023    Flu vaccine (1) 08/01/2024       Cathi Peck LPN

## 2024-08-29 NOTE — PROGRESS NOTES
Chief Complaint   Patient presents with    Follow-Up from Hospital     Pt has discharge paperwork, [facility] ER, 24 admitted, for chest pain    Shortness of Breath     Pt reports often getting short of breath.  Ie, when speaking, pt needs to stop to catch breath     st Discharge Summary REPORT#:[id number] REPORT STATUS: Signed DATE:24 TIME: 1410 PATIENT: KRISTEN WILLETT UNIT #: [id number] ACCOUNT#: [id number] ROOM/BED: [address] : 35 AGE: 88 SEX: F ATTEND: Fernando Mayer MD ADM DT: 24 AUTHOR: Mindy Randolph REP SRV DT: 24 REP SRV TM: 1410 * ALL edits or amendments must be made on the electronic/computer document * MINDY RANDOLPH 24 1410: General Information Discharge date: 24 Discharge diagnosis: Chest Pain Hospital course: 88-year-old female past medical history of CAD status post CABG, hyperlipidemia, hypertension, DVT/B (on Xarelto), thyroid disorder who presented to the emergency department with chest pain on . In ED, troponin negative x 2. Chest x-ray with no acute findings. EKG sinus rhythym with RBBB and occasional PVC, no ST changes. Last echo on 2024 showed normal EF moderate aortic stenosis. Cardiology has seen patient and recommended stress test. Per Dr. Campbell, stress test showed no ischemia and pt is cleared from cardiology standpoint. Patient is evaluated at bedside with her daughter-in-law present. Patient is sitting in hospital bed. Denies chest pain, difficulty breathing pain had a, vomiting, fever, chills, headache. Per cardiology, follow up with Dr. Thompson in outpatient clinic in 1-2 weeks. Follow up with PCP in 1-2 weeks for hospital follow up. No changes to medications. Pt is informed of the above, expresses understanding agrees with plan. Patient is stable for discharge.    Post-Discharge Transitional Care Management Progress Note      Kristen Willett   YOB: 1935    Date of Office Visit:  2024  Date of Hospital  frozen she never went.  She has been back to her normal activities taking her normal meds her blood pressure runs low but she denies being lightheaded she has a cardiology follow-up appointment in about a month she has a phone appointment with pulmonary to talk to them about dyspnea on exertion she does have moderate aortic stenosis on echocardiogram in August she wonders whether her gallbladder is a problem somebody told her that gallbladder disease can cause chest pain she denies fatty food intolerance    Inpatient course: Discharge summary reviewed- see chart.    Interval history/Current status: as above    Patient Active Problem List   Diagnosis    History of pulmonary embolism    Hyperthyroidism    Coronary artery disease due to calcified coronary lesion    Calculus of gallbladder and bile duct without cholecystitis    History of deep vein thrombosis (DVT) of lower extremity    Coronary artery disease involving autologous artery coronary bypass graft without angina pectoris    Peripheral vascular disease (HCC)    Hyperlipidemia with target LDL less than 70    Coronary artery disease with stable angina pectoris (HCC)    Living will, counseling/discussion    Osteopenia    Hypotension due to drugs    Essential hypertension    Allergic rhinitis, unspecified seasonality, unspecified trigger    Osteoarthrosis       Medications listed as ordered at the time of discharge from hospital     Medication List            Accurate as of August 29, 2024 12:10 PM. If you have any questions, ask your nurse or doctor.                CONTINUE taking these medications      acetaminophen 500 MG tablet  Commonly known as: TYLENOL     bumetanide 1 MG tablet  Commonly known as: BUMEX     clopidogrel 75 MG tablet  Commonly known as: PLAVIX     ezetimibe 10 MG tablet  Commonly known as: ZETIA  TAKE 1 TABLET DAILY     isosorbide mononitrate 60 MG extended release tablet  Commonly known as: IMDUR     losartan 25 MG tablet  Commonly known

## 2024-10-22 ENCOUNTER — TELEPHONE (OUTPATIENT)
Age: 88
End: 2024-10-22

## 2024-10-22 NOTE — TELEPHONE ENCOUNTER
Patient calling regarding results from her left shoulder x-ray and abdomen ultrasound. She states she had the imaging done at least two weeks ago and was told that they would send her results to Dr. Gaming. She would like to know if he has received them and if she needs to be seen to go over the results.

## 2024-10-24 NOTE — TELEPHONE ENCOUNTER
Place a call to [place] verify name and     Patient is requesting X-Ray and ultrasound results, Inform her that we have not receive any results but I can try and requested again, patient would like updates with results.      Patient voice understood       Lurdes BENAVIDES

## 2024-11-11 RX ORDER — ROSUVASTATIN CALCIUM 40 MG/1
40 TABLET, COATED ORAL DAILY
Qty: 90 TABLET | Refills: 3 | Status: SHIPPED | OUTPATIENT
Start: 2024-11-11

## 2024-12-27 RX ORDER — RANOLAZINE 500 MG/1
500 TABLET, EXTENDED RELEASE ORAL DAILY
Qty: 90 TABLET | Refills: 3 | OUTPATIENT
Start: 2024-12-27

## 2025-01-13 ENCOUNTER — TELEPHONE (OUTPATIENT)
Age: 89
End: 2025-01-13

## 2025-01-13 NOTE — TELEPHONE ENCOUNTER
Junie Escobar from [facility] health is calling on behalf of pt to update provider. Pt is starting home health and was recently discharged from the hospital ([facility]) on [facility] but before she left they found an UTI pt started an antibiotic. Pt needs an order for a Urinalysis faxed over to fax number [phone]. They will call back for a hospital follow up.

## 2025-01-15 ENCOUNTER — TELEPHONE (OUTPATIENT)
Age: 89
End: 2025-01-15

## 2025-01-15 RX ORDER — RANOLAZINE 500 MG/1
500 TABLET, EXTENDED RELEASE ORAL 2 TIMES DAILY
Qty: 180 TABLET | Refills: 1 | Status: SHIPPED | OUTPATIENT
Start: 2025-01-15

## 2025-01-15 NOTE — TELEPHONE ENCOUNTER
Pt is requesting the following medication to be refilled and sent to the pharmacy on file.    ranolazine (RANEXA) 500 MG extended release tablet [[id number]]     Prescription needs to be rewritten to 2 tablets daily instead of 1. Pt takes a tablet in the morning and at night due to [place] increasing the dosage. Pt is also requesting a 3 month supply if possible.

## 2025-01-15 NOTE — TELEPHONE ENCOUNTER
No contact number from original caller Junie Escobar. Spoke with patient. Scheduled for soonest available hospital follow up 01/28/25.

## 2025-01-28 ENCOUNTER — OFFICE VISIT (OUTPATIENT)
Age: 89
End: 2025-01-28
Payer: MEDICARE

## 2025-01-28 VITALS
RESPIRATION RATE: 18 BRPM | WEIGHT: 145 LBS | HEIGHT: 65 IN | HEART RATE: 64 BPM | DIASTOLIC BLOOD PRESSURE: 57 MMHG | OXYGEN SATURATION: 94 % | SYSTOLIC BLOOD PRESSURE: 106 MMHG | BODY MASS INDEX: 24.16 KG/M2 | TEMPERATURE: 97.6 F

## 2025-01-28 DIAGNOSIS — I25.84 CORONARY ARTERY DISEASE DUE TO CALCIFIED CORONARY LESION: ICD-10-CM

## 2025-01-28 DIAGNOSIS — Z09 HOSPITAL DISCHARGE FOLLOW-UP: Primary | ICD-10-CM

## 2025-01-28 DIAGNOSIS — I50.9 ACUTE ON CHRONIC HEART FAILURE, UNSPECIFIED HEART FAILURE TYPE (HCC): ICD-10-CM

## 2025-01-28 DIAGNOSIS — Z79.899 HIGH RISK MEDICATION USE: ICD-10-CM

## 2025-01-28 DIAGNOSIS — I25.10 CORONARY ARTERY DISEASE DUE TO CALCIFIED CORONARY LESION: ICD-10-CM

## 2025-01-28 PROCEDURE — 99214 OFFICE O/P EST MOD 30 MIN: CPT | Performed by: INTERNAL MEDICINE

## 2025-01-28 RX ORDER — EMPAGLIFLOZIN 10 MG/1
10 TABLET, FILM COATED ORAL DAILY
COMMUNITY
Start: 2025-01-09 | End: 2025-01-28 | Stop reason: SDUPTHER

## 2025-01-28 RX ORDER — EMPAGLIFLOZIN 10 MG/1
10 TABLET, FILM COATED ORAL DAILY
Qty: 90 TABLET | Refills: 1 | Status: SHIPPED | OUTPATIENT
Start: 2025-01-28

## 2025-01-28 SDOH — ECONOMIC STABILITY: FOOD INSECURITY: WITHIN THE PAST 12 MONTHS, YOU WORRIED THAT YOUR FOOD WOULD RUN OUT BEFORE YOU GOT MONEY TO BUY MORE.: NEVER TRUE

## 2025-01-28 SDOH — ECONOMIC STABILITY: FOOD INSECURITY: WITHIN THE PAST 12 MONTHS, THE FOOD YOU BOUGHT JUST DIDN'T LAST AND YOU DIDN'T HAVE MONEY TO GET MORE.: NEVER TRUE

## 2025-01-28 ASSESSMENT — PATIENT HEALTH QUESTIONNAIRE - PHQ9
SUM OF ALL RESPONSES TO PHQ QUESTIONS 1-9: 0
SUM OF ALL RESPONSES TO PHQ QUESTIONS 1-9: 0
1. LITTLE INTEREST OR PLEASURE IN DOING THINGS: NOT AT ALL
SUM OF ALL RESPONSES TO PHQ QUESTIONS 1-9: 0
SUM OF ALL RESPONSES TO PHQ9 QUESTIONS 1 & 2: 0
2. FEELING DOWN, DEPRESSED OR HOPELESS: NOT AT ALL
SUM OF ALL RESPONSES TO PHQ QUESTIONS 1-9: 0

## 2025-01-28 NOTE — PROGRESS NOTES
I have reviewed all needed documentation in preparation for visit. Verified patient by name and date of birth  Chief Complaint   Patient presents with    Follow-Up from Hospital     [facility] /Rehab 12/10/24-1/10/25   Cardiac Surgery 2 stents placed and Pacemaker .       There were no vitals filed for this visit.    Health Maintenance Due   Topic Date Due    DTaP/Tdap/Td vaccine (1 - Tdap) Never done    Shingles vaccine (2 of 3) 10/27/2010    Respiratory Syncytial Virus (RSV) Pregnant or age 60 yrs+ (1 - 1-dose 75+ series) Never done    Lipids  01/27/2023    Flu vaccine (1) 08/01/2024    COVID-19 Vaccine (4 - 2023-24 season) 09/01/2024     \"Have you been to the ER, urgent care clinic since your last visit?  Hospitalized since your last visit?\"    YES - When: approximately 1 months ago.  Where and Why: [facility] -Pacemaker  place 12/10/2024 .    “Have you seen or consulted any other health care providers outside of [facility] since your last visit?”    NO            Click Here for Release of Records Request         [facility]

## 2025-01-28 NOTE — PROGRESS NOTES
Post-Discharge Transitional Care Management Progress Note      Kristen Smith   YOB: 1935    Date of Office Visit:  1/28/2025  Date of Hospital Admission: 12/20/24  Date of Hospital Discharge: 1/10/25    Care management risk score Rising risk (score 2-5) and Complex Care (Scores >=6): No Risk Score On File     Non face to face  following discharge, date last encounter closed (first attempt may have been earlier): *No documented post hospital discharge outreach found in the last 14 days *No documented post hospital discharge outreach found in the last 14 days    Call initiated 2 business days of discharge: *No response recorded in the last 14 days    ASSESSMENT/PLAN:   Hospital discharge follow-up  -     DC DISCHARGE MEDS RECONCILED W/ CURRENT OUTPATIENT MED LIST    Medical Decision Making: moderate complexity  Return in 3 months (on 4/28/2025).         Subjective:   HPI:  Follow up of Hospital problems/diagnosis(es): This is an 89-year-old  female with a history of hypertension dyslipidemia coronary disease prior coronary bypass surgery with new onset shortness of breath she had seen her cardiologist earlier in December they had doubled her dose of Bumex she continued to have shortness of breath so she came in and was admitted to [facility] around 1220 at that point she had an elevated BNP 1170 chest x-ray showed bilateral pleural effusions she was admitted and treated for systolic heart failure she was evaluated and had a reduced ejection fraction had a cardiac cath had 2 vessels that were obstructed she had a stenting and a and and also an endarterectomy she was had a biventricular pacemaker placed which is for lead pacemaker this was placed by a different cardiologist that seem to help she had been flat on her back for 9 days so she was referred for rehab.    She has been taken off some of her meds she is off her previous Xarelto she is now on aspirin Plavix she has been taken off a couple

## 2025-01-30 DIAGNOSIS — I50.22 CHRONIC SYSTOLIC HEART FAILURE (HCC): Primary | ICD-10-CM

## 2025-01-30 PROCEDURE — G0180 MD CERTIFICATION HHA PATIENT: HCPCS | Performed by: INTERNAL MEDICINE

## 2025-01-30 NOTE — PROGRESS NOTES
Today I authorized home health care for Mrs. Smith with care [facility] skilled from [facility] this would be for post pacemaker implantation and and cardiac stents she is got new onset of chronic systolic heart failure this is for medication management follow-up on the wound from the pacemaker and following her status

## 2025-02-23 RX ORDER — ESTRADIOL 10 UG/1
TABLET VAGINAL
Qty: 24 TABLET | Refills: 0 | Status: SHIPPED | OUTPATIENT
Start: 2025-02-23

## 2025-02-26 ENCOUNTER — TELEPHONE (OUTPATIENT)
Age: 89
End: 2025-02-26

## 2025-02-26 NOTE — TELEPHONE ENCOUNTER
Patient calling to request a refill of vagifem to be sent to the [facility] on file. Could not locate this medication in the patient's chart, however a refill for YUVAFEM 10 MCG TABS vaginal tablet was sent to the [facility] on file on 02/23/25. Patient hung up before I could inform her of this.    Left vm informing patient that a refill of YUVAFEM 10 MCG TABS vaginal tablet was sent to the [facility] on file on 02/23 and requested that if they have not received this medication, she return a call to [facility] so that a new request for it to be sent again can be submitted. Left number for patient to return call.

## 2025-03-06 RX ORDER — ROSUVASTATIN CALCIUM 40 MG/1
40 TABLET, COATED ORAL NIGHTLY
Qty: 90 TABLET | Refills: 3 | OUTPATIENT
Start: 2025-03-06

## 2025-03-17 RX ORDER — ESTRADIOL 10 UG/1
TABLET VAGINAL
Qty: 24 TABLET | Refills: 0 | Status: SHIPPED | OUTPATIENT
Start: 2025-03-17

## 2025-03-17 NOTE — TELEPHONE ENCOUNTER
Refill request received from [facility] for   Requested Prescriptions     Pending Prescriptions Disp Refills    YUVAFEM 10 MCG TABS vaginal tablet [Pharmacy Med Name: YUVAFEM 10 MCG VAGINAL INSERT] 24 tablet 0     Sig: USE 1 TABLET VAGINALLY ON TUESDAYS AND FRIDAYS     Last office visit: 1/28/2025   Next office visit: Visit date not found     Routed to Dr Jose Gaming for review.

## 2025-03-17 NOTE — TELEPHONE ENCOUNTER
Pt is requesting a refill for   rosuvastatin (CRESTOR) 40 MG tablet [[id number]] .  Pt states she is all out of this medication

## 2025-03-19 ENCOUNTER — TELEPHONE (OUTPATIENT)
Age: 89
End: 2025-03-19

## 2025-03-19 NOTE — TELEPHONE ENCOUNTER
Pt is calling back from a missed call from trudy. [facility] pt that trudy was now with a pt and could call her back when she was available. Pt stated she will not be available for a call back for the rest of the day. Pt believed that trudy was calling back to confirm where her prescription for the rosuvastatin (CRESTOR) 40 MG tablet [[id number]] was going to.     Pt wanted to confirm that this medication is to be sent to the [facility] on [address] [facility] not [facility]     Pt stated she will be out today and would like to be able to pick it up while she is out.

## 2025-03-19 NOTE — TELEPHONE ENCOUNTER
[facility] calling to confirm our fax number. States they have been trying to fax a refill request to Dr. Gaming, however the fax is not going through. They are requesting a refill of rosuvastatin (CRESTOR) 40 MG tablet  to be sent over for the patient. I let them know that a message has been routed to Dr. Gaming for review. She understood.

## 2025-03-19 NOTE — TELEPHONE ENCOUNTER
[facility]11[facility]24 with 3 refills available  Called [facility]. Verified patient identity lam Pressley confirms pt account is now inactive    Refill request received from [facility]    for   Requested Prescriptions     Pending Prescriptions Disp Refills    rosuvastatin (CRESTOR) 40 MG tablet 90 tablet 3     Sig: Take 1 tablet by mouth daily     Signed Prescriptions Disp Refills    YUVAFEM 10 MCG TABS vaginal tablet 24 tablet 0     Sig: USE 1 TABLET VAGINALLY ON TUESDAYS AND FRIDAYS     Authorizing Provider: LUCÍA GAMING     Last office visit: 1/28/2025   Next office visit: Visit date not found     Routed to Dr Lucía Gaming for review.     Cathi Peck LPN

## 2025-03-20 RX ORDER — ROSUVASTATIN CALCIUM 40 MG/1
40 TABLET, COATED ORAL DAILY
Qty: 90 TABLET | Refills: 3 | Status: SHIPPED | OUTPATIENT
Start: 2025-03-20

## 2025-04-09 NOTE — TELEPHONE ENCOUNTER
Patient requesting a 90 day supply of methIMAzole (TAPAZOLE) 5 MG tablet to be sent to the [facility] on file. Patient scheduled to establish care with Dr. Javed on 04/16/25.   
Statement Selected
Initial (On Arrival)

## 2025-04-10 RX ORDER — RANOLAZINE 500 MG/1
500 TABLET, EXTENDED RELEASE ORAL DAILY
Qty: 90 TABLET | Refills: 1 | Status: SHIPPED | OUTPATIENT
Start: 2025-04-10

## 2025-04-16 ENCOUNTER — OFFICE VISIT (OUTPATIENT)
Age: 89
End: 2025-04-16

## 2025-04-16 VITALS — BODY MASS INDEX: 24.72 KG/M2 | WEIGHT: 148.4 LBS | HEIGHT: 65 IN | RESPIRATION RATE: 18 BRPM

## 2025-04-16 DIAGNOSIS — K59.09 CHRONIC CONSTIPATION: Primary | ICD-10-CM

## 2025-04-16 DIAGNOSIS — E05.90 HYPERTHYROIDISM: ICD-10-CM

## 2025-04-16 RX ORDER — METHIMAZOLE 5 MG/1
5 TABLET ORAL DAILY
Qty: 90 TABLET | Refills: 1 | Status: SHIPPED | OUTPATIENT
Start: 2025-04-16

## 2025-04-16 RX ORDER — SENNOSIDES A AND B 8.6 MG/1
2 TABLET, FILM COATED ORAL 2 TIMES DAILY
Qty: 120 TABLET | Refills: 3 | Status: SHIPPED | OUTPATIENT
Start: 2025-04-16 | End: 2026-04-16

## 2025-04-16 NOTE — PROGRESS NOTES
I have reviewed all needed documentation in preparation for visit. Verified patient by name and date of birth  Chief Complaint   Patient presents with    Establish Care       Vitals:    04/16/25 1521   Resp: 18   Weight: 67.3 kg (148 lb 6.4 oz)   Height: 1.651 m (5' 5\")       Health Maintenance Due   Topic Date Due    DTaP/Tdap/Td vaccine (1 - Tdap) Never done    Shingles vaccine (2 of 3) 10/27/2010    Respiratory Syncytial Virus (RSV) Pregnant or age 60 yrs+ (1 - 1-dose 75+ series) Never done    Lipids  01/27/2023    COVID-19 Vaccine (4 - 2024-25 season) 09/01/2024     \"Have you been to the ER, urgent care clinic since your last visit?  Hospitalized since your last visit?\"    NO    “Have you seen or consulted any other health care providers outside of [facility] since your last visit?”    NO            Click Here for Release of Records Request         AHLI Juárez  
TONGUE EVERY 5 MINUTES AS NEEDED FOR CHEST PAIN Yes Automatic Reconciliation, Ar       CareTeam (Including outside providers/suppliers regularly involved in providing care):   Patient Care Team:  Maci Javed DO as PCP - General (Internal Medicine)  Maci Javed DO as PCP - Empaneled Provider     Recommendations for Preventive Services Due: see orders and patient instructions/AVS.  Recommended screening schedule for the next 5-10 years is provided to the patient in written form: see Patient Instructions/AVS.     Reviewed and updated this visit:  Tobacco  Allergies  Meds  Problems  Med Hx  Surg Hx  Fam Hx  Sexual   Hx

## 2025-04-18 ASSESSMENT — PATIENT HEALTH QUESTIONNAIRE - PHQ9
SUM OF ALL RESPONSES TO PHQ QUESTIONS 1-9: 0
2. FEELING DOWN, DEPRESSED OR HOPELESS: NOT AT ALL
1. LITTLE INTEREST OR PLEASURE IN DOING THINGS: NOT AT ALL
SUM OF ALL RESPONSES TO PHQ QUESTIONS 1-9: 0

## 2025-04-18 ASSESSMENT — LIFESTYLE VARIABLES
HOW MANY STANDARD DRINKS CONTAINING ALCOHOL DO YOU HAVE ON A TYPICAL DAY: PATIENT DOES NOT DRINK
HOW OFTEN DO YOU HAVE A DRINK CONTAINING ALCOHOL: NEVER

## 2025-05-12 ENCOUNTER — OFFICE VISIT (OUTPATIENT)
Age: 89
End: 2025-05-12
Payer: MEDICARE

## 2025-05-12 VITALS
HEART RATE: 71 BPM | DIASTOLIC BLOOD PRESSURE: 65 MMHG | HEIGHT: 65 IN | RESPIRATION RATE: 18 BRPM | SYSTOLIC BLOOD PRESSURE: 113 MMHG | BODY MASS INDEX: 24.16 KG/M2 | TEMPERATURE: 98.2 F | WEIGHT: 145 LBS

## 2025-05-12 DIAGNOSIS — R97.8 OTHER ABNORMAL TUMOR MARKERS: ICD-10-CM

## 2025-05-12 DIAGNOSIS — R10.13 EPIGASTRIC PAIN: ICD-10-CM

## 2025-05-12 DIAGNOSIS — M25.512 CHRONIC LEFT SHOULDER PAIN: Primary | ICD-10-CM

## 2025-05-12 DIAGNOSIS — R39.9 LOWER URINARY TRACT SYMPTOMS (LUTS): ICD-10-CM

## 2025-05-12 DIAGNOSIS — G89.29 CHRONIC LEFT SHOULDER PAIN: Primary | ICD-10-CM

## 2025-05-12 DIAGNOSIS — K21.9 GASTROESOPHAGEAL REFLUX DISEASE WITHOUT ESOPHAGITIS: ICD-10-CM

## 2025-05-12 LAB
BILIRUBIN, URINE, POC: NEGATIVE
BLOOD URINE, POC: NEGATIVE
GLUCOSE URINE, POC: NEGATIVE
KETONES, URINE, POC: NEGATIVE
LEUKOCYTE ESTERASE, URINE, POC: NEGATIVE
NITRITE, URINE, POC: NEGATIVE
PH, URINE, POC: 5.5 (ref 4.6–8)
PROTEIN,URINE, POC: NEGATIVE
SPECIFIC GRAVITY, URINE, POC: 1.01 (ref 1–1.03)
URINALYSIS CLARITY, POC: CLEAR
URINALYSIS COLOR, POC: YELLOW
UROBILINOGEN, POC: NORMAL MG/DL

## 2025-05-12 PROCEDURE — 81001 URINALYSIS AUTO W/SCOPE: CPT | Performed by: STUDENT IN AN ORGANIZED HEALTH CARE EDUCATION/TRAINING PROGRAM

## 2025-05-12 PROCEDURE — PBSHW AMB POC URINALYSIS DIP STICK AUTO W/ MICRO: Performed by: STUDENT IN AN ORGANIZED HEALTH CARE EDUCATION/TRAINING PROGRAM

## 2025-05-12 PROCEDURE — 1036F TOBACCO NON-USER: CPT | Performed by: STUDENT IN AN ORGANIZED HEALTH CARE EDUCATION/TRAINING PROGRAM

## 2025-05-12 PROCEDURE — 99214 OFFICE O/P EST MOD 30 MIN: CPT | Performed by: STUDENT IN AN ORGANIZED HEALTH CARE EDUCATION/TRAINING PROGRAM

## 2025-05-12 PROCEDURE — 1123F ACP DISCUSS/DSCN MKR DOCD: CPT | Performed by: STUDENT IN AN ORGANIZED HEALTH CARE EDUCATION/TRAINING PROGRAM

## 2025-05-12 PROCEDURE — 1090F PRES/ABSN URINE INCON ASSESS: CPT | Performed by: STUDENT IN AN ORGANIZED HEALTH CARE EDUCATION/TRAINING PROGRAM

## 2025-05-12 PROCEDURE — 1160F RVW MEDS BY RX/DR IN RCRD: CPT | Performed by: STUDENT IN AN ORGANIZED HEALTH CARE EDUCATION/TRAINING PROGRAM

## 2025-05-12 PROCEDURE — G8420 CALC BMI NORM PARAMETERS: HCPCS | Performed by: STUDENT IN AN ORGANIZED HEALTH CARE EDUCATION/TRAINING PROGRAM

## 2025-05-12 PROCEDURE — G8427 DOCREV CUR MEDS BY ELIG CLIN: HCPCS | Performed by: STUDENT IN AN ORGANIZED HEALTH CARE EDUCATION/TRAINING PROGRAM

## 2025-05-12 PROCEDURE — 1159F MED LIST DOCD IN RCRD: CPT | Performed by: STUDENT IN AN ORGANIZED HEALTH CARE EDUCATION/TRAINING PROGRAM

## 2025-05-12 PROCEDURE — G2211 COMPLEX E/M VISIT ADD ON: HCPCS | Performed by: STUDENT IN AN ORGANIZED HEALTH CARE EDUCATION/TRAINING PROGRAM

## 2025-05-12 RX ORDER — FAMOTIDINE 40 MG/1
40 TABLET, FILM COATED ORAL EVERY EVENING
Qty: 30 TABLET | Refills: 3 | Status: SHIPPED | OUTPATIENT
Start: 2025-05-12

## 2025-05-12 NOTE — PROGRESS NOTES
I have reviewed all needed documentation in preparation for visit. Verified patient by name and date of birth  Chief Complaint   Patient presents with    GI Problem     Stomach gurgling x 3 weeks       Vitals:    05/12/25 1403   BP: 113/65   BP Site: Left Upper Arm   Patient Position: Sitting   BP Cuff Size: Medium Adult   Pulse: 71   Resp: 18   Temp: 98.2 °F (36.8 °C)   TempSrc: Temporal   Weight: 65.8 kg (145 lb)   Height: 1.651 m (5' 5\")       Health Maintenance Due   Topic Date Due    DTaP/Tdap/Td vaccine (1 - Tdap) Never done    Shingles vaccine (2 of 3) 10/27/2010    Respiratory Syncytial Virus (RSV) Pregnant or age 60 yrs+ (1 - 1-dose 75+ series) Never done    Lipids  01/27/2023    COVID-19 Vaccine (4 - 2024-25 season) 09/01/2024     \"Have you been to the ER, urgent care clinic since your last visit?  Hospitalized since your last visit?\"    NO    “Have you seen or consulted any other health care providers outside of [facility] since your last visit?”    NO            Click Here for Release of Records Request         HALI Juárez

## 2025-05-12 NOTE — PATIENT INSTRUCTIONS
Stomach: start famotidine nightly before bed 40 mg    Persistent constipation: add miralax and benefiber    Vaginal Dryness: Replens Over the counter moisturizer.

## 2025-05-12 NOTE — PROGRESS NOTES
Kristen Smith is a 89 y.o. female  Chief Complaint   Patient presents with    GI Problem     Stomach gurgling x 3 weeks     Acute care      HPI  Stomach gurgling: No pain no epigastric pain just some fullness in her abdomen.  Family history significant for pancreatic cancer in 2 immediate relatives.  No acid reflux symptoms.  Gurgling is so loud sometimes wakes her up at night.    Constipation: Improved with senna twice daily.  Still having pellet-like stools and some straining and remnant constipation.  Says she has tried MiraLAX in the past but does not like the way taste and does not find it very effective.    Burning with urination: Has been going on for about a week no fevers chills no suprapubic tenderness no flank pain.  Thinks maybe she got soap in her vaginal area and that is what is causing the pain with urination.    Left shoulder OA: Ongoing for years has never received steroid injection to the site but now shoulder has significantly decreased range of motion.    ROS  Review of Systems      Vitals:    05/12/25 1403   BP: 113/65   Pulse: 71   Resp: 18   Temp: 98.2 °F (36.8 °C)      Wt Readings from Last 3 Encounters:   05/12/25 65.8 kg (145 lb)   04/16/25 67.3 kg (148 lb 6.4 oz)   01/28/25 65.8 kg (145 lb)       EXAM  Physical Exam    Lab Results   Component Value Date    CHOL 140 01/27/2022    TRIG 88 01/27/2022    HDL 52 01/27/2022    LDL 71 01/27/2022    VLDL 17 01/27/2022    CHOLHDLRATIO 2.7 02/06/2020     Lab Results   Component Value Date    WBC 7.0 01/27/2022    HGB 12.3 01/27/2022    HCT 40.6 01/27/2022    MCV 93.5 01/27/2022     01/27/2022     Lab Results   Component Value Date     01/27/2022    K 4.3 01/27/2022     01/27/2022    CO2 27 01/27/2022    BUN 14 01/27/2022    CREATININE 0.71 01/27/2022    GLUCOSE 97 01/27/2022    CALCIUM 9.2 01/27/2022    BILITOT 0.5 01/27/2022    ALKPHOS 74 01/27/2022    AST 28 01/27/2022    ALT 32 01/27/2022    GFRAA >60 01/27/2022    AGRATIO

## 2025-05-15 LAB — LIPASE SERPL-CCNC: 27 U/L (ref 14–85)

## 2025-05-16 ENCOUNTER — RESULTS FOLLOW-UP (OUTPATIENT)
Age: 89
End: 2025-05-16

## 2025-05-16 LAB
CANCER AG19-9 SERPL-ACNC: 15 U/ML (ref 0–35)
CEA SERPL-MCNC: 0.8 NG/ML (ref 0–4.7)

## 2025-05-16 NOTE — TELEPHONE ENCOUNTER
----- Message from Dr. Maci Javed DO sent at 5/16/2025 11:01 AM EDT -----  Please let her know all results negative. Thank you!  ----- Message -----  From: Reynold Mcgarry Incoming Amb Ref Lab Orders To [facility]  Sent: 5/15/2025  11:36 AM EDT  To: Maci Javed DO

## 2025-05-16 NOTE — TELEPHONE ENCOUNTER
Staff called patient and notified patient of lab results and cancer marker results. Patient expressed understanding.     HALI Juárez

## 2025-06-03 ENCOUNTER — TELEPHONE (OUTPATIENT)
Age: 89
End: 2025-06-03

## 2025-06-03 RX ORDER — RANOLAZINE 500 MG/1
500 TABLET, EXTENDED RELEASE ORAL DAILY
Qty: 90 TABLET | Refills: 1 | Status: SHIPPED | OUTPATIENT
Start: 2025-06-03 | End: 2025-06-06 | Stop reason: SDUPTHER

## 2025-06-03 NOTE — TELEPHONE ENCOUNTER
Patient has been taking two pills of ranolazine  a day she was not aware that she should only be taking one and is now in need of a refill for the ranolazine.

## 2025-06-06 ENCOUNTER — TELEPHONE (OUTPATIENT)
Age: 89
End: 2025-06-06

## 2025-06-06 RX ORDER — RANOLAZINE 500 MG/1
500 TABLET, EXTENDED RELEASE ORAL DAILY
Qty: 90 TABLET | Refills: 1 | Status: SHIPPED | OUTPATIENT
Start: 2025-06-06

## 2025-06-06 RX ORDER — ROSUVASTATIN CALCIUM 40 MG/1
40 TABLET, COATED ORAL DAILY
Qty: 90 TABLET | Refills: 3 | Status: SHIPPED | OUTPATIENT
Start: 2025-06-06

## 2025-06-06 RX ORDER — EZETIMIBE 10 MG/1
10 TABLET ORAL DAILY
Qty: 90 TABLET | Refills: 3 | Status: SHIPPED | OUTPATIENT
Start: 2025-06-06

## 2025-06-06 NOTE — TELEPHONE ENCOUNTER
PT is requesting a refill for     rosuvastatin (CRESTOR) 40 MG tablet [[id number]]     ranolazine (RANEXA) 500 MG extended release tablet [[id number]]       ezetimibe (ZETIA) 10 MG tablet [[id number]]

## 2025-06-06 NOTE — TELEPHONE ENCOUNTER
Refill request received from [facility] for   Requested Prescriptions     Pending Prescriptions Disp Refills    ranolazine (RANEXA) 500 MG extended release tablet 90 tablet 1     Sig: Take 1 tablet by mouth daily    rosuvastatin (CRESTOR) 40 MG tablet 90 tablet 3     Sig: Take 1 tablet by mouth daily    ezetimibe (ZETIA) 10 MG tablet 90 tablet 3     Sig: Take 1 tablet by mouth daily     Last office visit: 5/12/2025   Next office visit: Visit date not found     Routed to Dr Maci Javed for review.         Johnson Fair Cma

## 2025-06-11 DIAGNOSIS — K21.9 GASTROESOPHAGEAL REFLUX DISEASE WITHOUT ESOPHAGITIS: ICD-10-CM

## 2025-06-11 RX ORDER — FAMOTIDINE 40 MG/1
40 TABLET, FILM COATED ORAL EVERY EVENING
Qty: 90 TABLET | Refills: 1 | OUTPATIENT
Start: 2025-06-11

## 2025-07-02 ENCOUNTER — TELEPHONE (OUTPATIENT)
Age: 89
End: 2025-07-02

## 2025-07-02 NOTE — TELEPHONE ENCOUNTER
Pt would like a call back to confirm she's able to take this over the counter medication with her other medications without any problems. This over the counter medication is a cough medicine called dexdromethorphan 15 mg

## 2025-08-20 ENCOUNTER — OFFICE VISIT (OUTPATIENT)
Age: 89
End: 2025-08-20
Payer: MEDICARE

## 2025-08-20 VITALS
OXYGEN SATURATION: 95 % | HEART RATE: 68 BPM | SYSTOLIC BLOOD PRESSURE: 130 MMHG | TEMPERATURE: 98.4 F | WEIGHT: 136 LBS | DIASTOLIC BLOOD PRESSURE: 78 MMHG | BODY MASS INDEX: 23.22 KG/M2 | HEIGHT: 64 IN | RESPIRATION RATE: 16 BRPM

## 2025-08-20 DIAGNOSIS — E87.6 HYPOKALEMIA: ICD-10-CM

## 2025-08-20 DIAGNOSIS — Z00.00 MEDICARE ANNUAL WELLNESS VISIT, SUBSEQUENT: ICD-10-CM

## 2025-08-20 DIAGNOSIS — R06.02 SHORTNESS OF BREATH: Primary | ICD-10-CM

## 2025-08-20 DIAGNOSIS — R53.81 PHYSICAL DECONDITIONING: ICD-10-CM

## 2025-08-20 PROCEDURE — G8427 DOCREV CUR MEDS BY ELIG CLIN: HCPCS | Performed by: STUDENT IN AN ORGANIZED HEALTH CARE EDUCATION/TRAINING PROGRAM

## 2025-08-20 PROCEDURE — 1036F TOBACCO NON-USER: CPT | Performed by: STUDENT IN AN ORGANIZED HEALTH CARE EDUCATION/TRAINING PROGRAM

## 2025-08-20 PROCEDURE — 1159F MED LIST DOCD IN RCRD: CPT | Performed by: STUDENT IN AN ORGANIZED HEALTH CARE EDUCATION/TRAINING PROGRAM

## 2025-08-20 PROCEDURE — 1160F RVW MEDS BY RX/DR IN RCRD: CPT | Performed by: STUDENT IN AN ORGANIZED HEALTH CARE EDUCATION/TRAINING PROGRAM

## 2025-08-20 PROCEDURE — G8420 CALC BMI NORM PARAMETERS: HCPCS | Performed by: STUDENT IN AN ORGANIZED HEALTH CARE EDUCATION/TRAINING PROGRAM

## 2025-08-20 PROCEDURE — 1090F PRES/ABSN URINE INCON ASSESS: CPT | Performed by: STUDENT IN AN ORGANIZED HEALTH CARE EDUCATION/TRAINING PROGRAM

## 2025-08-20 PROCEDURE — 99214 OFFICE O/P EST MOD 30 MIN: CPT | Performed by: STUDENT IN AN ORGANIZED HEALTH CARE EDUCATION/TRAINING PROGRAM

## 2025-08-20 PROCEDURE — G2211 COMPLEX E/M VISIT ADD ON: HCPCS | Performed by: STUDENT IN AN ORGANIZED HEALTH CARE EDUCATION/TRAINING PROGRAM

## 2025-08-20 PROCEDURE — G0439 PPPS, SUBSEQ VISIT: HCPCS | Performed by: STUDENT IN AN ORGANIZED HEALTH CARE EDUCATION/TRAINING PROGRAM

## 2025-08-20 PROCEDURE — 1123F ACP DISCUSS/DSCN MKR DOCD: CPT | Performed by: STUDENT IN AN ORGANIZED HEALTH CARE EDUCATION/TRAINING PROGRAM

## 2025-08-20 RX ORDER — POTASSIUM CHLORIDE 1500 MG/1
20 TABLET, EXTENDED RELEASE ORAL DAILY
COMMUNITY
Start: 2025-07-26

## 2025-08-20 ASSESSMENT — PATIENT HEALTH QUESTIONNAIRE - PHQ9
SUM OF ALL RESPONSES TO PHQ QUESTIONS 1-9: 0
2. FEELING DOWN, DEPRESSED OR HOPELESS: NOT AT ALL
SUM OF ALL RESPONSES TO PHQ QUESTIONS 1-9: 0
1. LITTLE INTEREST OR PLEASURE IN DOING THINGS: NOT AT ALL
SUM OF ALL RESPONSES TO PHQ QUESTIONS 1-9: 0
SUM OF ALL RESPONSES TO PHQ QUESTIONS 1-9: 0

## 2025-08-20 ASSESSMENT — ENCOUNTER SYMPTOMS
COUGH: 0
VOMITING: 0
NAUSEA: 0
SHORTNESS OF BREATH: 0
SINUS PAIN: 0
CONSTIPATION: 0
EYE REDNESS: 0
DIARRHEA: 0
ABDOMINAL PAIN: 0
WHEEZING: 0

## 2025-08-20 ASSESSMENT — LIFESTYLE VARIABLES
HOW OFTEN DO YOU HAVE A DRINK CONTAINING ALCOHOL: NEVER
HOW MANY STANDARD DRINKS CONTAINING ALCOHOL DO YOU HAVE ON A TYPICAL DAY: PATIENT DOES NOT DRINK

## 2025-08-21 LAB — POTASSIUM SERPL-SCNC: 3.8 MMOL/L (ref 3.5–5.2)
